# Patient Record
Sex: FEMALE | Race: WHITE | NOT HISPANIC OR LATINO | Employment: OTHER | URBAN - METROPOLITAN AREA
[De-identification: names, ages, dates, MRNs, and addresses within clinical notes are randomized per-mention and may not be internally consistent; named-entity substitution may affect disease eponyms.]

---

## 2017-02-20 RX ORDER — CONJUGATED ESTROGENS AND MEDROXYPROGESTERONE ACETATE .625; 2.5 MG/1; MG/1
TABLET, SUGAR COATED ORAL
Qty: 28 TABLET | Refills: 6 | OUTPATIENT
Start: 2017-02-20

## 2017-03-17 RX ORDER — CONJUGATED ESTROGENS AND MEDROXYPROGESTERONE ACETATE .625; 2.5 MG/1; MG/1
TABLET, SUGAR COATED ORAL
Qty: 28 TABLET | Refills: 6 | OUTPATIENT
Start: 2017-03-17

## 2017-03-22 ENCOUNTER — OFFICE VISIT (OUTPATIENT)
Dept: OBSTETRICS AND GYNECOLOGY | Facility: CLINIC | Age: 27
End: 2017-03-22
Payer: MEDICAID

## 2017-03-22 VITALS
DIASTOLIC BLOOD PRESSURE: 80 MMHG | WEIGHT: 243 LBS | RESPIRATION RATE: 10 BRPM | SYSTOLIC BLOOD PRESSURE: 120 MMHG | BODY MASS INDEX: 40.48 KG/M2 | HEART RATE: 78 BPM | HEIGHT: 65 IN

## 2017-03-22 DIAGNOSIS — Z01.419 WELL WOMAN EXAM WITH ROUTINE GYNECOLOGICAL EXAM: Primary | ICD-10-CM

## 2017-03-22 DIAGNOSIS — Z12.4 CERVICAL CANCER SCREENING: ICD-10-CM

## 2017-03-22 DIAGNOSIS — Z79.890 HORMONE REPLACEMENT THERAPY (HRT): ICD-10-CM

## 2017-03-22 DIAGNOSIS — E28.39 PREMATURE OVARIAN FAILURE: ICD-10-CM

## 2017-03-22 DIAGNOSIS — M85.80 OSTEOPENIA DETERMINED BY X-RAY: ICD-10-CM

## 2017-03-22 PROCEDURE — 88175 CYTOPATH C/V AUTO FLUID REDO: CPT

## 2017-03-22 PROCEDURE — 99213 OFFICE O/P EST LOW 20 MIN: CPT | Mod: PBBFAC | Performed by: OBSTETRICS & GYNECOLOGY

## 2017-03-22 PROCEDURE — 99395 PREV VISIT EST AGE 18-39: CPT | Mod: S$PBB,,, | Performed by: OBSTETRICS & GYNECOLOGY

## 2017-03-22 PROCEDURE — 99999 PR PBB SHADOW E&M-EST. PATIENT-LVL III: CPT | Mod: PBBFAC,,, | Performed by: OBSTETRICS & GYNECOLOGY

## 2017-03-22 RX ORDER — ALBUTEROL SULFATE 90 UG/1
AEROSOL, METERED RESPIRATORY (INHALATION)
Refills: 0 | COMMUNITY
Start: 2017-03-14 | End: 2018-04-19

## 2017-03-22 RX ORDER — VENLAFAXINE HYDROCHLORIDE 75 MG/1
1 CAPSULE, EXTENDED RELEASE ORAL DAILY
Refills: 0 | COMMUNITY
Start: 2017-02-13 | End: 2018-04-19

## 2017-03-22 NOTE — MR AVS SNAPSHOT
[facility]  [address] [facility] [address]  Phone: [phone]                  Francis Bains   3/22/2017 4:15 PM   Office Visit    Description:  Female : 1990   Provider:  Rand Morrison MD   Department:  [facility]           Reason for Visit     Gynecologic Exam           Diagnoses this Visit        Comments    Well woman exam with routine gynecological exam    -  Primary     Cervical cancer screening         Premature ovarian failure         Osteopenia determined by x-ray         Hormone replacement therapy (HRT)                To Do List           Goals (5 Years of Data)     None       These Medications        Disp Refills Start End    estrogen, conjugated,-medroxyprogesterone 0.625-2.5mg (PREMPRO) 0.625-2.5 mg per tablet 45 tablet 11 3/22/2017 2017    Take 1.5 tablets by mouth once daily. - Oral    Pharmacy: [address] Ph #: [phone]         [facility]     [facility] On Call Nurse Care Line -  Assistance  Registered nurses in the [facility] On Call Center provide clinical advisement, health education, appointment booking, and other advisory services.  Call for this free service at [phone].             Medications           Message regarding Medications     Verify the changes and/or additions to your medication regime listed below are the same as discussed with your clinician today.  If any of these changes or additions are incorrect, please notify your healthcare provider.        START taking these NEW medications        Refills    estrogen, conjugated,-medroxyprogesterone 0.625-2.5mg (PREMPRO) 0.625-2.5 mg per tablet 11    Sig: Take 1.5 tablets by mouth once daily.    Class: Normal    Route: Oral      STOP taking these medications     clonazePAM (KLONOPIN) 0.5 MG tablet 1 tablet once daily.    fluoxetine (PROZAC) 40 MG capsule Take 40 mg by mouth once daily.     "hydrocodone-acetaminophen 7.5-325mg (NORCO) 7.5-325 mg per tablet Take 1 tablet by mouth every 6 (six) hours as needed.    trazodone (DESYREL) 50 MG tablet Take 50 mg by mouth nightly.           Verify that the below list of medications is an accurate representation of the medications you are currently taking.  If none reported, the list may be blank. If incorrect, please contact your healthcare provider. Carry this list with you in case of emergency.           Current Medications     amlodipine (NORVASC) 10 MG tablet Take 10 mg by mouth once daily.    venlafaxine (EFFEXOR-XR) 75 MG 24 hr capsule 1 capsule once daily.    VENTOLIN HFA 90 mcg/actuation inhaler INHALE 2 PUFFS BY MOUTH EVERY 4 TO 6 HOURS AS NEEDED    estrogen, conjugated,-medroxyprogesterone 0.625-2.5mg (PREMPRO) 0.625-2.5 mg per tablet Take 1.5 tablets by mouth once daily.           Clinical Reference Information           Your Vitals Were     BP Pulse Resp Height Weight BMI    120/80 78 10 5' 5" (1.651 m) 110.2 kg (243 lb) 40.44 kg/m2      Blood Pressure          Most Recent Value    BP  120/80      Allergies as of 3/22/2017     Sulfa (Sulfonamide Antibiotics)    Pcn  [Penicillins]      Immunizations Administered on Date of Encounter - 3/22/2017     None      Orders Placed During Today's Visit      Normal Orders This Visit    Liquid-based pap smear, screening       Smoking Cessation     If you would like to quit smoking:   You may be eligible for free services if you are a [place] resident and started smoking cigarettes before September 1, 1988.  Call the [employer]ess[employer] ([employer]) toll free at [phone] 850-4354 or [phone].   Call 8-664-QUIT-NOW if you do not meet the above criteria.            Language Assistance Services     ATTENTION: Language assistance services are available, free of charge. Please call [phone].      ATENCIÓN: Si malachila kyler, tiene a kurtz disposición servicios gratuitos de asistencia lingüística. Llame al " [phone].     HALLE Ý: N?u b?n nói Ti?ng Vi?t, có các d?ch v? h? tr? ngôn ng? mi?n phí dành cho b?n. G?i s? [phone].         [facility] complies with applicable Federal civil rights laws and does not discriminate on the basis of race, color, national origin, age, disability, or sex.

## 2017-03-22 NOTE — PROGRESS NOTES
Subjective:    Patient ID: Francis Bains is a 27 y.o. y.o. female.     Chief Complaint: Annual Well Woman Exam     History of Present Illness:  Francis presents today for Annual Well Woman exam. She describes her menses as absent.She denies pelvic pain.  She denies breast tenderness, masses, nipple discharge. She denies difficulty with urination or bowel movements. She admits to menopausal symptoms such as hotflashes, vaginal dryness, and night sweats. She denies bloating, early satiety, or weight changes. She is not currently sexually active. Contraception is by no method.    Patient requesting increase in HRT secondary to still having symptoms of mood swings, hot flashes, and night sweats.       Menstrual History:   No LMP recorded. Patient is not currently having periods (Reason: Other)..     OB History      Para Term  AB TAB SAB Ectopic Multiple Living    0                   The following portions of the patient's history were reviewed and updated as appropriate: allergies, current medications, past family history, past medical history, past social history, past surgical history and problem list.    ROS:   CONSTITUTIONAL: Negative for fever, chills, diaphoresis, weakness, fatigue, weight loss, weight gain  ENT: negative for sore throat, nasal congestion, nasal discharge, epistaxis, tinnitus, hearing loss  EYES: negative for blurry vision, decreased vision, loss of vision, eye pain, diplopia, photophobia, discharge  SKIN: Negative for rash, itching, hives  RESPIRATORY: negative for cough, hemoptysis, shortness of breath, pleuritic chest pain, wheezing  CARDIOVASCULAR: negative for chest pain, dyspnea on exertion, orthopnea, paroxysmal nocturnal dyspnea, edema, palpitations  BREAST: negative for breast  tenderness, breast mass, nipple discharge, or skin changes  GASTROINTESTINAL: negative for abdominal pain, flank pain, nausea, vomiting, diarrhea, constipation, black stool, blood in  "stool  GENITOURINARY: negative for abnormal vaginal bleeding, amenorrhea, decreased libido, dysuria, genital sores, hematuria, incontinence, menorrhagia, pelvic pain, urinary frequency, vaginal discharge  HEMATOLOGIC/LYMPHATIC: negative for swollen lymph nodes, bleeding, bruising  MUSCULOSKELETAL: negative for back pain, joint pain, joint stiffness, joint swelling, muscle pain, muscle weakness  NEUROLOGICAL: negative for dizzy/vertigo, headache, focal weakness, numbness/tingling, speech problems, loss of consciousness, confusion, memory loss  BEHAVORIAL/PSYCH: negative for anxiety, depression, psychosis  ENDOCRINE: negative for polydipsia/polyuria, palpitations, skin changes, temperature intolerance, unexpected weight changes  ALLERGIC/IMMUNOLOGIC: negative for urticaria, hay fever, angioedema      Objective:    Vital Signs:  Vitals:    03/22/17 1618   BP: 120/80   Pulse: 78   Resp: 10   Weight: 110.2 kg (243 lb)   Height: 5' 5" (1.651 m)         Physical Exam:  General:  alert, cooperative, appears stated age   Skin:  Skin color, texture, turgor normal. No rashes or lesions   HEENT:  conjunctivae/corneas clear. PERRL.   Neck: supple, trachea midline, no adenopathy or thyromegally   Respiratory:  clear to auscultation bilaterally   Heart:  regular rate and rhythm, S1, S2 normal, no murmur, click, rub or gallop   Breasts:  no discharge, erythema, or tenderness   Abdomen:  normal findings: bowel sounds normal, no masses palpable, no organomegaly and soft, non-tender   Pelvis: External genitalia: normal general appearance  Urinary system: urethral meatus normal, bladder nontender  Vaginal: normal mucosa without prolapse or lesions  Cervix: normal appearance  Uterus: normal size, shape, position  Adnexa: normal size, nontender bilaterally   Extremities: Normal ROM; no edema, no cyanosis   Neurologial: Normal strength and tone. No focal numbness or weakness. Reflexes 2+ and equal.   Psychiatric: normal mood, speech, " dress, and thought processes         Assessment:       Healthy female exam.     1. Well woman exam with routine gynecological exam    2. Cervical cancer screening    3. Premature ovarian failure    4. Osteopenia determined by x-ray          Plan:       Increase prempro dosage; offered OCPs but declines    Discussed weight bearing exercise, calcium, and vitamin d for osteoporosis prevention  Pap smear  RTC in 1 year or prn    COUNSELING:  Francis was counseled on STD pevention, use and side-effects of various contraceptive measures, A.C.O.G. Pap guidelines and recommendations for yearly pelvic exams in addition to recommendations for monthly self breast exams; to see her PCP for other health maintenance.

## 2017-03-23 ENCOUNTER — TELEPHONE (OUTPATIENT)
Dept: OBSTETRICS AND GYNECOLOGY | Facility: CLINIC | Age: 27
End: 2017-03-23

## 2017-03-23 NOTE — TELEPHONE ENCOUNTER
Desire from pt pharmacy states Prempro unable to be dispensed as prescribed. Comes in unbreakable pack of 28, also states insurance will not cover 1.5 tablets of Prempro. Recommends trying Premarin or prescribing as separate ingredients to ensure desired dosage and insurance coverage. Please advise.

## 2017-03-23 NOTE — TELEPHONE ENCOUNTER
----- Message from Shadia Ortiz MA sent at 3/23/2017  9:55 AM CDT -----  Contact: [facility]   Francis Bains  MRN: [id number]  : 1990  PCP: Tereza Magaña  Home Phone      [phone]  Work Phone      Not on file.  Mobile          [phone]      MESSAGE:   [facility] is calling for verification of a medication that was send over yesterday please call @ [phone]

## 2017-05-26 ENCOUNTER — CLINICAL SUPPORT (OUTPATIENT)
Dept: AUDIOLOGY | Facility: CLINIC | Age: 27
End: 2017-05-26
Payer: MEDICAID

## 2017-05-26 DIAGNOSIS — H90.3 SENSORINEURAL HEARING LOSS, BILATERAL: Primary | ICD-10-CM

## 2017-05-26 PROCEDURE — 92604 REPROGRAM COCHLEAR IMPLT 7/>: CPT | Mod: PBBFAC | Performed by: PHYSICIAN ASSISTANT

## 2017-05-26 NOTE — PROGRESS NOTES
Cochlear Implant Programming Session:    Francis was in today with her friend to check the status of her  sound processor.  Her dog chewed the beige coil/cable to the point of non-use.  Her processor and battery were still in good condition.  I loaned her my white N5 coil/cable.  She was much surprised when she put the processor on her head, saying it was just too loud.  It has been about 1 1/2 months since she has worn her processor.  I lowered her overall program and gave gradual maps with increased loudness.  She was comfortable with the sound quality when she left my office.      Her main reason for today's visit was to discuss the upgrade process.  She decided on the  ear level unit.  I will help her start the process with [employer].  We will complete audiological testing when we program her upgraded processor.

## 2018-01-10 ENCOUNTER — CLINICAL SUPPORT (OUTPATIENT)
Dept: AUDIOLOGY | Facility: CLINIC | Age: 28
End: 2018-01-10
Payer: MEDICAID

## 2018-01-10 DIAGNOSIS — H90.3 SENSORINEURAL HEARING LOSS, BILATERAL: Primary | ICD-10-CM

## 2018-01-10 PROCEDURE — 92604 REPROGRAM COCHLEAR IMPLT 7/>: CPT | Mod: PBBFAC | Performed by: PHYSICIAN ASSISTANT

## 2018-01-10 NOTE — PROGRESS NOTES
Cochlear Implant Programming Session:    DOS: 3/16/2010; Right Ear;  internal device    Ms. Bains upgraded her cochlear processor today from the N5 unit to the N7 [id number] processor (beige).  She was experiencing a lot of intermittencies with her N5 unit.  She was pleased with her new processor and appreciated the difference in the sound quality.  She required a #5 magnet with the N7 unit for best retention.      We gave 2 programs today:  P1-SCAN (comfortable at a volume of 5)  P2-somewhat louder program without SCAN    Francis does not have the iPhone, so we paired the phone clip to her processor.  She was really excited about being able to hear better on her cell phone via the phone clip.  She also appreciated that she could hear music!  She will be using rechargeable batteries with her new upgraded processor. We reviewed the use of the Aqua Accessory as well.    Recommend:  1.  Consistent and continued use of her N7 processor  2.  Audiological testing on her next visit  3.  Programming follow up visit in one month

## 2018-02-19 ENCOUNTER — PATIENT MESSAGE (OUTPATIENT)
Dept: AUDIOLOGY | Facility: CLINIC | Age: 28
End: 2018-02-19

## 2018-03-26 NOTE — TELEPHONE ENCOUNTER
Francis desire refill of Prempro. Annual exam scheduled this week.   Patient Active Problem List   Diagnosis    Female infertility of unspecified origin    Infantile cerebral palsy, unspecified    Osteopenia determined by x-ray    Premature ovarian failure     Prior to Admission medications    Medication Sig Start Date End Date Taking? Authorizing Provider   amlodipine (NORVASC) 10 MG tablet Take 10 mg by mouth once daily. 8/11/14   Historical Provider, MD   estrogen, conjugated,-medroxyprogesterone 0.3-1.5 mg (PREMPRO) 0.3-1.5 mg per tablet Take 1 tablet by mouth once daily. 3/23/17 3/23/18  Rand Morrison MD   venlafaxine (EFFEXOR-XR) 75 MG 24 hr capsule 1 capsule once daily. 2/13/17   Historical Provider, MD   VENTOLIN HFA 90 mcg/actuation inhaler INHALE 2 PUFFS BY MOUTH EVERY 4 TO 6 HOURS AS NEEDED 3/14/17   Historical Provider, MD

## 2018-03-28 RX ORDER — CONJUGATED ESTROGENS AND MEDROXYPROGESTERONE ACETATE .3; 1.5 MG/1; MG/1
1 TABLET, SUGAR COATED ORAL DAILY
Qty: 28 TABLET | Refills: 0 | Status: SHIPPED | OUTPATIENT
Start: 2018-03-28 | End: 2018-04-19 | Stop reason: SDUPTHER

## 2018-03-28 RX ORDER — CONJUGATED ESTROGENS AND MEDROXYPROGESTERONE ACETATE .625; 2.5 MG/1; MG/1
1 TABLET, SUGAR COATED ORAL DAILY
Qty: 28 TABLET | Refills: 0 | Status: SHIPPED | OUTPATIENT
Start: 2018-03-28 | End: 2018-04-19 | Stop reason: SDUPTHER

## 2018-04-19 ENCOUNTER — OFFICE VISIT (OUTPATIENT)
Dept: OBSTETRICS AND GYNECOLOGY | Facility: CLINIC | Age: 28
End: 2018-04-19
Payer: MEDICAID

## 2018-04-19 VITALS
HEIGHT: 65 IN | DIASTOLIC BLOOD PRESSURE: 68 MMHG | RESPIRATION RATE: 10 BRPM | WEIGHT: 258 LBS | BODY MASS INDEX: 42.99 KG/M2 | SYSTOLIC BLOOD PRESSURE: 120 MMHG | HEART RATE: 88 BPM

## 2018-04-19 DIAGNOSIS — E28.39 PREMATURE OVARIAN FAILURE: ICD-10-CM

## 2018-04-19 DIAGNOSIS — Z01.419 WELL WOMAN EXAM WITH ROUTINE GYNECOLOGICAL EXAM: Primary | ICD-10-CM

## 2018-04-19 DIAGNOSIS — Z79.890 HORMONE REPLACEMENT THERAPY (HRT): ICD-10-CM

## 2018-04-19 DIAGNOSIS — M85.80 OSTEOPENIA DETERMINED BY X-RAY: ICD-10-CM

## 2018-04-19 PROCEDURE — 99395 PREV VISIT EST AGE 18-39: CPT | Mod: S$PBB,,, | Performed by: OBSTETRICS & GYNECOLOGY

## 2018-04-19 PROCEDURE — 99213 OFFICE O/P EST LOW 20 MIN: CPT | Mod: PBBFAC,PN | Performed by: OBSTETRICS & GYNECOLOGY

## 2018-04-19 PROCEDURE — 99999 PR PBB SHADOW E&M-EST. PATIENT-LVL III: CPT | Mod: PBBFAC,,, | Performed by: OBSTETRICS & GYNECOLOGY

## 2018-04-19 RX ORDER — PANTOPRAZOLE SODIUM 40 MG/1
TABLET, DELAYED RELEASE ORAL
COMMUNITY
Start: 2018-04-12 | End: 2019-11-22

## 2018-04-19 RX ORDER — MIRTAZAPINE 15 MG/1
TABLET, FILM COATED ORAL
COMMUNITY
Start: 2018-03-26 | End: 2021-03-18

## 2018-04-19 RX ORDER — SERTRALINE HYDROCHLORIDE 100 MG/1
TABLET, FILM COATED ORAL
Status: ON HOLD | COMMUNITY
Start: 2018-03-03 | End: 2022-06-10 | Stop reason: SDUPTHER

## 2018-04-19 RX ORDER — SUCRALFATE 1 G/10ML
SUSPENSION ORAL
COMMUNITY
Start: 2018-04-16 | End: 2019-11-22

## 2018-04-19 NOTE — PROGRESS NOTES
Subjective:    Patient ID: Francis Bains is a 28 y.o. y.o. female.     Chief Complaint: Annual Well Woman Exam     History of Present Illness:  Francis presents today for Annual Well Woman exam. She describes her menses as absent.She denies pelvic pain.  She denies breast tenderness, masses, nipple discharge. She denies difficulty with urination or bowel movements. She admits to menopausal symptoms such as hotflashes, vaginal dryness, and night sweats however better controlled with current Prempro dosage. She denies bloating, early satiety, or weight changes. She is not currently sexually active. Contraception is by no method.      Menstrual History:   No LMP recorded. Patient is not currently having periods (Reason: Other)..     OB History      Para Term  AB Living    0              SAB TAB Ectopic Multiple Live Births                       The following portions of the patient's history were reviewed and updated as appropriate: allergies, current medications, past family history, past medical history, past social history, past surgical history and problem list.    ROS:   CONSTITUTIONAL: Negative for fever, chills, diaphoresis, weakness, fatigue, weight loss, weight gain  ENT: negative for sore throat, nasal congestion, nasal discharge, epistaxis, tinnitus, hearing loss  EYES: negative for blurry vision, decreased vision, loss of vision, eye pain, diplopia, photophobia, discharge  SKIN: Negative for rash, itching, hives  RESPIRATORY: negative for cough, hemoptysis, shortness of breath, pleuritic chest pain, wheezing  CARDIOVASCULAR: negative for chest pain, dyspnea on exertion, orthopnea, paroxysmal nocturnal dyspnea, edema, palpitations  BREAST: negative for breast  tenderness, breast mass, nipple discharge, or skin changes  GASTROINTESTINAL: negative for abdominal pain, flank pain, nausea, vomiting, diarrhea, constipation, black stool, blood in stool  GENITOURINARY: negative for abnormal  "vaginal bleeding, amenorrhea, decreased libido, dysuria, genital sores, hematuria, incontinence, menorrhagia, pelvic pain, urinary frequency, vaginal discharge  HEMATOLOGIC/LYMPHATIC: negative for swollen lymph nodes, bleeding, bruising  MUSCULOSKELETAL: negative for back pain, joint pain, joint stiffness, joint swelling, muscle pain, muscle weakness  NEUROLOGICAL: negative for dizzy/vertigo, headache, focal weakness, numbness/tingling, speech problems, loss of consciousness, confusion, memory loss  BEHAVORIAL/PSYCH: negative for anxiety, depression, psychosis  ENDOCRINE: negative for polydipsia/polyuria, palpitations, skin changes, temperature intolerance, unexpected weight changes  ALLERGIC/IMMUNOLOGIC: negative for urticaria, hay fever, angioedema      Objective:    Vital Signs:  Vitals:    04/19/18 1201   BP: 120/68   Pulse: 88   Resp: 10   Weight: 117 kg (258 lb)   Height: 5' 5" (1.651 m)         Physical Exam:  General:  alert, cooperative, appears stated age   Skin:  Skin color, texture, turgor normal. No rashes or lesions   HEENT:  conjunctivae/corneas clear. PERRL.   Neck: supple, trachea midline, no adenopathy or thyromegally   Respiratory:  clear to auscultation bilaterally   Heart:  regular rate and rhythm, S1, S2 normal, no murmur, click, rub or gallop   Breasts:  no discharge, erythema, or tenderness   Abdomen:  normal findings: bowel sounds normal, no masses palpable, no organomegaly and soft, non-tender   Pelvis: External genitalia: normal general appearance  Urinary system: urethral meatus normal, bladder nontender  Vaginal: normal mucosa without prolapse or lesions  Cervix: normal appearance  Uterus: normal size, shape, position  Adnexa: normal size, nontender bilaterally   Extremities: Normal ROM; no edema, no cyanosis   Neurologial: Normal strength and tone. No focal numbness or weakness. Reflexes 2+ and equal.   Psychiatric: normal mood, speech, dress, and thought processes         Assessment:  "      Healthy female exam.     1. Well woman exam with routine gynecological exam    2. Hormone replacement therapy (HRT)    3. Osteopenia determined by x-ray    4. Premature ovarian failure          Plan:        Refill Prempro; patient on 0.3 and 0.635 dosage daily    Discussed weight bearing exercise, calcium, and vitamin d for osteoporosis prevention  Pap smear deferred  RTC in 1 year or prn    COUNSELING:  Francis was counseled on STD pevention, use and side-effects of various contraceptive measures, A.C.O.G. Pap guidelines and recommendations for yearly pelvic exams in addition to recommendations for monthly self breast exams; to see her PCP for other health maintenance.

## 2018-09-02 ENCOUNTER — PATIENT MESSAGE (OUTPATIENT)
Dept: OBSTETRICS AND GYNECOLOGY | Facility: CLINIC | Age: 28
End: 2018-09-02

## 2018-09-22 ENCOUNTER — HOSPITAL ENCOUNTER (EMERGENCY)
Facility: HOSPITAL | Age: 28
Discharge: HOME | End: 2018-09-22
Attending: SURGERY
Payer: MEDICAID

## 2018-09-22 VITALS
WEIGHT: 258 LBS | DIASTOLIC BLOOD PRESSURE: 80 MMHG | OXYGEN SATURATION: 100 % | BODY MASS INDEX: 42.93 KG/M2 | RESPIRATION RATE: 17 BRPM | SYSTOLIC BLOOD PRESSURE: 175 MMHG | HEART RATE: 70 BPM | TEMPERATURE: 97 F

## 2018-09-22 DIAGNOSIS — F41.9 ANXIETY: Primary | ICD-10-CM

## 2018-09-22 DIAGNOSIS — M54.50 MIDLINE LOW BACK PAIN WITHOUT SCIATICA, UNSPECIFIED CHRONICITY: ICD-10-CM

## 2018-09-22 PROCEDURE — 96372 THER/PROPH/DIAG INJ SC/IM: CPT

## 2018-09-22 PROCEDURE — 63600175 PHARM REV CODE 636 W HCPCS: Performed by: SURGERY

## 2018-09-22 PROCEDURE — 99284 EMERGENCY DEPT VISIT MOD MDM: CPT | Mod: 25

## 2018-09-22 RX ORDER — CYCLOBENZAPRINE HCL 10 MG
10 TABLET ORAL 3 TIMES DAILY PRN
Qty: 10 TABLET | Refills: 0 | Status: SHIPPED | OUTPATIENT
Start: 2018-09-22 | End: 2018-09-27

## 2018-09-22 RX ORDER — LORAZEPAM 2 MG/ML
2 INJECTION INTRAMUSCULAR
Status: COMPLETED | OUTPATIENT
Start: 2018-09-22 | End: 2018-09-22

## 2018-09-22 RX ORDER — ONDANSETRON 2 MG/ML
4 INJECTION INTRAMUSCULAR; INTRAVENOUS
Status: COMPLETED | OUTPATIENT
Start: 2018-09-22 | End: 2018-09-22

## 2018-09-22 RX ORDER — METHYLPREDNISOLONE 4 MG/1
TABLET ORAL
Qty: 1 PACKAGE | Refills: 0 | Status: SHIPPED | OUTPATIENT
Start: 2018-09-22 | End: 2021-03-18

## 2018-09-22 RX ORDER — MORPHINE SULFATE 2 MG/ML
2 INJECTION, SOLUTION INTRAMUSCULAR; INTRAVENOUS
Status: COMPLETED | OUTPATIENT
Start: 2018-09-22 | End: 2018-09-22

## 2018-09-22 RX ORDER — KETOROLAC TROMETHAMINE 10 MG/1
10 TABLET, FILM COATED ORAL EVERY 6 HOURS PRN
Qty: 15 TABLET | Refills: 0 | Status: SHIPPED | OUTPATIENT
Start: 2018-09-22 | End: 2021-03-18

## 2018-09-22 RX ADMIN — LORAZEPAM 2 MG: 2 INJECTION INTRAMUSCULAR; INTRAVENOUS at 07:09

## 2018-09-22 RX ADMIN — ONDANSETRON 4 MG: 2 INJECTION INTRAMUSCULAR; INTRAVENOUS at 06:09

## 2018-09-22 RX ADMIN — Medication 2 MG: at 06:09

## 2018-09-23 NOTE — ED PROVIDER NOTES
[facility]                                                 Chief Complaint  28 y.o. female with Back Pain    History of Present Illness  Francis Bains presents to the emergency room with low back pain today  Patient had low back pain for last couple days, history of low back pain issues  Denies any new trauma or fall, chronic low back pain almost daily for years now  Patient on exam has a normal lumbar spine without step-off or deformity noted  Normal neurologic exam with normal leg strength, no cauda equina syndrome  Patient had a negative x-ray today in the ER, requesting a shot also for anxiety    The history is provided by the patient   device was not used during this ER visit    Past Medical History   -- Cerebral palsy    -- Hearing impaired person    -- Hypertension    -- Osteopenia determined by x-ray    -- Oswaldo syndrome      Past Surgical History   -- CHOLECYSTECTOMY     -- INNER EAR SURGERY     -- RENAL BIOPSY        Review of patient's allergies   -- Sulfa (sulfonamide antibiotics)    -- Pcn  [penicillins]       Review of Systems and Physical Exam      Review of Systems  -- Constitution - no fever, denies fatigue, no weakness, no chills  -- Eyes - no tearing or redness, no visual disturbance  -- Ear, Nose - no tinnitus or earache, no nasal congestion or discharge  -- Mouth,Throat - no sore throat, no toothache, normal voice, normal swallowing  -- Respiratory - denies cough and congestion, no shortness of breath, no MCPHERSON  -- Cardiovascular - denies chest pain, no palpitations, denies claudication  -- Gastrointestinal - denies abdominal pain, nausea, vomiting, or diarrhea  -- Genitourinary - no dysuria, no hematuria, no flank pain, no bladder pain  -- Musculoskeletal - back pain, negative for myalgias and arthralgias   -- Neurological - no headache, denies weakness or seizure; no LOC  -- Psychiatric - anxiety, denies SI or HI, no psychosis or fractured thought  noted     Vital Signs  Her blood pressure is 184/100 and her pulse is 73.   Her respiration is 17 and oxygen saturation is 98%.     Physical Exam  -- Nursing note and vitals reviewed  -- Constitutional: Appears well-developed and well-nourished  -- Head: Atraumatic. Normocephalic. No obvious abnormality  -- Eyes: Pupils are equal and reactive to light. Normal conjunctiva and lids  -- Cardiac: Normal rate, regular rhythm and normal heart sounds  -- Pulmonary: Normal respiratory effort, breath sounds clear to auscultation  -- Abdominal: Soft, no tenderness. Normal bowel sounds. Normal liver edge  -- Musculoskeletal: Normal range of motion, no effusions. Joints stable   -- Neurological: No focal deficits. Showed good interaction with staff  -- Skin: Warm and dry. No evidence of rash or cellulitis    Emergency Room Course      Radiology  -- L-spine x-rays showed no evidence of acute fracture or dislocation     Medications Given  morphine injection 2 mg (2 mg Intramuscular Given 9/22/18 1854)   ondansetron injection 4 mg (4 mg Intramuscular Given 9/22/18 1854)   lorazepam injection 2 mg (2 mg Intramuscular Given 9/22/18 1949)     Diagnosis  -- Anxiety  -- Midline low back pain without sciatica    Disposition and Plan  -- Disposition: home  -- Condition: stable  -- Follow-up: Patient to follow up with Tereza Magaña MD in 1-2 days.  -- I advised the patient that we have found no life threatening condition today  -- At this time, I believe the patient is clinically stable for discharge.   -- The patient acknowledges that close follow up with a MD is required   -- Patient agrees to comply with all instruction and direction given in the ER    This note is dictated on Dragon Natural Speaking word recognition program.  There are word recognition mistakes that are occasionally missed on review.          Guerrero Urban MD  09/22/18 1958

## 2019-03-21 ENCOUNTER — PATIENT MESSAGE (OUTPATIENT)
Dept: AUDIOLOGY | Facility: CLINIC | Age: 29
End: 2019-03-21

## 2019-03-26 ENCOUNTER — CLINICAL SUPPORT (OUTPATIENT)
Dept: AUDIOLOGY | Facility: CLINIC | Age: 29
End: 2019-03-26
Payer: MEDICAID

## 2019-03-26 DIAGNOSIS — H90.3 SENSORINEURAL HEARING LOSS, BILATERAL: Primary | ICD-10-CM

## 2019-03-26 PROCEDURE — 99211 OFF/OP EST MAY X REQ PHY/QHP: CPT | Mod: PBBFAC,25 | Performed by: PHYSICIAN ASSISTANT

## 2019-03-26 PROCEDURE — 92557 COMPREHENSIVE HEARING TEST: CPT | Mod: PBBFAC | Performed by: PHYSICIAN ASSISTANT

## 2019-03-26 PROCEDURE — 99999 PR PBB SHADOW E&M-EST. PATIENT-LVL I: CPT | Mod: PBBFAC,,, | Performed by: PHYSICIAN ASSISTANT

## 2019-03-26 PROCEDURE — 99999 PR PBB SHADOW E&M-EST. PATIENT-LVL I: ICD-10-PCS | Mod: PBBFAC,,, | Performed by: PHYSICIAN ASSISTANT

## 2019-03-26 NOTE — PROGRESS NOTES
"Cochlear Implant Programming Session:    Right Ear:   - Yolto  Internal Device/Serial Number- [id number]  Processor- [id number]  DOS: 3/16/2010  DIS: 4/28/2010  Fitting Date- 1/2018 (upgraded from the N5)  Processor Color- Beige  Magnet Strength- 5  Coil Length- 2" (too short, per patient)  Battery Type- Rechargeable  Warranty- Expires 2020    Ms. Bains was in today with her boyfriend. She was in for a routine programming visit.  She is doing well overall with her N7 sound processor.  She does complain however that the overall sound on program 2 is too soft.  Her N7 sound processor was connected to [employer] and some changes were made to her 't' and 'c' levels.  She was pleased with the overall increase in stimulation.  This new setting was put in P2 with no changes to P1.  We did change her miguelina over on the N7 processor.  Ms. Bains says her cable is too short, so I will order one for her through [employer] and have it shipped to her home address.    P1: SCAN  P2: increased sound; no SCAN    Recommend:  1.  Consistent and continued use of her N7 sound processor.  2.  Annual programming visits  3.  Annual audiological testing            "

## 2019-05-01 ENCOUNTER — OFFICE VISIT (OUTPATIENT)
Dept: OBSTETRICS AND GYNECOLOGY | Facility: CLINIC | Age: 29
End: 2019-05-01
Payer: MEDICAID

## 2019-05-01 VITALS
HEIGHT: 65 IN | BODY MASS INDEX: 37.82 KG/M2 | HEART RATE: 80 BPM | RESPIRATION RATE: 10 BRPM | SYSTOLIC BLOOD PRESSURE: 120 MMHG | WEIGHT: 227 LBS | DIASTOLIC BLOOD PRESSURE: 78 MMHG

## 2019-05-01 DIAGNOSIS — Z12.39 SCREENING FOR BREAST CANCER: ICD-10-CM

## 2019-05-01 DIAGNOSIS — Z79.890 HORMONE REPLACEMENT THERAPY (HRT): ICD-10-CM

## 2019-05-01 DIAGNOSIS — E28.39 PREMATURE OVARIAN FAILURE: ICD-10-CM

## 2019-05-01 DIAGNOSIS — Z01.419 WELL WOMAN EXAM WITH ROUTINE GYNECOLOGICAL EXAM: Primary | ICD-10-CM

## 2019-05-01 DIAGNOSIS — M85.80 OSTEOPENIA DETERMINED BY X-RAY: ICD-10-CM

## 2019-05-01 PROCEDURE — 99999 PR PBB SHADOW E&M-EST. PATIENT-LVL III: CPT | Mod: PBBFAC,,, | Performed by: OBSTETRICS & GYNECOLOGY

## 2019-05-01 PROCEDURE — 99999 PR PBB SHADOW E&M-EST. PATIENT-LVL III: ICD-10-PCS | Mod: PBBFAC,,, | Performed by: OBSTETRICS & GYNECOLOGY

## 2019-05-01 PROCEDURE — 99395 PREV VISIT EST AGE 18-39: CPT | Mod: S$PBB,,, | Performed by: OBSTETRICS & GYNECOLOGY

## 2019-05-01 PROCEDURE — 99395 PR PREVENTIVE VISIT,EST,18-39: ICD-10-PCS | Mod: S$PBB,,, | Performed by: OBSTETRICS & GYNECOLOGY

## 2019-05-01 PROCEDURE — 99213 OFFICE O/P EST LOW 20 MIN: CPT | Mod: PBBFAC | Performed by: OBSTETRICS & GYNECOLOGY

## 2019-05-01 RX ORDER — BUSPIRONE HYDROCHLORIDE 15 MG/1
TABLET ORAL
Refills: 3 | COMMUNITY
Start: 2019-04-23 | End: 2021-03-18

## 2019-05-01 NOTE — PROGRESS NOTES
Subjective:    Patient ID: Francis Bains is a 29 y.o. y.o. female.     Chief Complaint: Annual Well Woman Exam     History of Present Illness:  Francis presents today for Annual Well Woman exam. She describes her menses as absent.She denies pelvic pain.  She denies breast tenderness, masses, nipple discharge. She denies difficulty with urination or bowel movements. She admits to menopausal symptoms such as hotflashes, vaginal dryness, and night sweats however better controlled with current Prempro dosage. She denies bloating, early satiety, or weight changes. She is sexually active. Contraception is by no method.    The following portions of the patient's history were reviewed and updated as appropriate: allergies, current medications, past family history, past medical history, past social history, past surgical history and problem list.      Menstrual History:   No LMP recorded. (Menstrual status: Other)..     OB History        0    Para        Term                AB        Living           SAB        TAB        Ectopic        Multiple        Live Births                     The following portions of the patient's history were reviewed and updated as appropriate: allergies, current medications, past family history, past medical history, past social history, past surgical history and problem list.    ROS:   CONSTITUTIONAL: Negative for fever, chills, diaphoresis, weakness, fatigue, weight loss, weight gain  ENT: negative for sore throat, nasal congestion, nasal discharge, epistaxis, tinnitus, hearing loss  EYES: negative for blurry vision, decreased vision, loss of vision, eye pain, diplopia, photophobia, discharge  SKIN: Negative for rash, itching, hives  RESPIRATORY: negative for cough, hemoptysis, shortness of breath, pleuritic chest pain, wheezing  CARDIOVASCULAR: negative for chest pain, dyspnea on exertion, orthopnea, paroxysmal nocturnal dyspnea, edema, palpitations  BREAST: negative for  "breast  tenderness, breast mass, nipple discharge, or skin changes  GASTROINTESTINAL: negative for abdominal pain, flank pain, nausea, vomiting, diarrhea, constipation, black stool, blood in stool  GENITOURINARY: negative for abnormal vaginal bleeding, amenorrhea, decreased libido, dysuria, genital sores, hematuria, incontinence, menorrhagia, pelvic pain, urinary frequency, vaginal discharge  HEMATOLOGIC/LYMPHATIC: negative for swollen lymph nodes, bleeding, bruising  MUSCULOSKELETAL: negative for back pain, joint pain, joint stiffness, joint swelling, muscle pain, muscle weakness  NEUROLOGICAL: negative for dizzy/vertigo, headache, focal weakness, numbness/tingling, speech problems, loss of consciousness, confusion, memory loss  BEHAVORIAL/PSYCH: negative for anxiety, depression, psychosis  ENDOCRINE: negative for polydipsia/polyuria, palpitations, skin changes, temperature intolerance, unexpected weight changes  ALLERGIC/IMMUNOLOGIC: negative for urticaria, hay fever, angioedema      Objective:    Vital Signs:  Vitals:    05/01/19 1240   BP: 120/78   Pulse: 80   Resp: 10   Weight: 103 kg (227 lb)   Height: 5' 5" (1.651 m)         Physical Exam:  General:  alert, cooperative, appears stated age   Skin:  Skin color, texture, turgor normal. No rashes or lesions   HEENT:  conjunctivae/corneas clear. PERRL.   Neck: supple, trachea midline, no adenopathy or thyromegally   Respiratory:  clear to auscultation bilaterally   Heart:  regular rate and rhythm, S1, S2 normal, no murmur, click, rub or gallop   Breasts:  no discharge, erythema, or tenderness   Abdomen:  normal findings: bowel sounds normal, no masses palpable, no organomegaly and soft, non-tender   Pelvis: External genitalia: normal general appearance  Urinary system: urethral meatus normal, bladder nontender  Vaginal: normal mucosa without prolapse or lesions  Cervix: normal appearance  Uterus: normal size, shape, position  Adnexa: normal size, nontender " bilaterally   Extremities: Normal ROM; no edema, no cyanosis   Neurologial: Normal strength and tone. No focal numbness or weakness. Reflexes 2+ and equal.   Psychiatric: normal mood, speech, dress, and thought processes         Assessment:       Healthy female exam.     1. Well woman exam with routine gynecological exam    2. Screening for breast cancer    3. Premature ovarian failure    4. Osteopenia determined by x-ray    5. Hormone replacement therapy (HRT)          Plan:        Prempro refill sent   Discussed weight bearing exercise, calcium, and vitamin d for osteoporosis prevention  DEXA scan next year   Pap smear deferred; will need repeating with HPV cotesting next year  RTC in 1 year or prn    COUNSELING:  Francis was counseled on STD pevention, use and side-effects of various contraceptive measures, A.C.O.G. Pap guidelines and recommendations for yearly pelvic exams in addition to recommendations for monthly self breast exams; to see her PCP for other health maintenance.

## 2019-05-06 ENCOUNTER — TELEPHONE (OUTPATIENT)
Dept: OBSTETRICS AND GYNECOLOGY | Facility: CLINIC | Age: 29
End: 2019-05-06

## 2019-05-06 NOTE — TELEPHONE ENCOUNTER
----- Message from Caryn Thornton sent at 5/6/2019 12:17 PM CDT -----  Contact: Self  Francis Bains  MRN: [id number]  Home Phone      [phone]  Work Phone      Not on file.  Mobile          [phone]    Patient Care Team:  Tereza Magaña MD as PCP - General (Family Medicine)  OB? No  What phone number can you be reached at? [phone]  Message:   Pt called via  service stating that she was seen last week by Dr. Lamb. States she came in to discuss an increase in her Prempro, but when she received the new medication it was actually a decrease in dosage. Worries that doctor may have been in a rush to get to a delivery and wasn't able to properly communicate her need for an increase in medication. Please advise.

## 2019-05-09 NOTE — TELEPHONE ENCOUNTER
Pt states that she was taking (1) 2.5mg and (1) 1.5 mg prempro before her last visit 05/01/2019. Pt states that she was under the impression she would be getting an increase in medication however she was only prescribed the 2.5mg. Pls adv.

## 2019-05-10 NOTE — TELEPHONE ENCOUNTER
Let patient know I would like to try her getting the estrogen injections with oral progesterone and we can see if that helps control her symptoms better than taking a higher dose of the pills than is supposed to be prescribed.

## 2019-05-14 NOTE — TELEPHONE ENCOUNTER
Patient notified via . Patient inquiring if Depo Estradiol injection is safe to use with her kidney problems. Dr. Lamb notified and instructed that it is safe to use. Patient notified and scheduled for nurse visit for injection on Friday.

## 2019-05-17 ENCOUNTER — CLINICAL SUPPORT (OUTPATIENT)
Dept: OBSTETRICS AND GYNECOLOGY | Facility: CLINIC | Age: 29
End: 2019-05-17
Payer: MEDICAID

## 2019-05-17 DIAGNOSIS — Z79.890 HORMONE REPLACEMENT THERAPY (HRT): Primary | ICD-10-CM

## 2019-05-17 PROCEDURE — 96372 THER/PROPH/DIAG INJ SC/IM: CPT | Mod: PBBFAC

## 2019-05-17 RX ADMIN — ESTRADIOL CYPIONATE 5 MG: 5 INJECTION INTRAMUSCULAR at 02:05

## 2019-05-17 NOTE — PROGRESS NOTES
Depo-Estradiol 5mg administered IM to RUOG as ordered. Patient waited for 15 minutes in clinic with no reaction noted.

## 2019-05-20 ENCOUNTER — PATIENT MESSAGE (OUTPATIENT)
Dept: OBSTETRICS AND GYNECOLOGY | Facility: CLINIC | Age: 29
End: 2019-05-20

## 2019-06-05 ENCOUNTER — PATIENT MESSAGE (OUTPATIENT)
Dept: OBSTETRICS AND GYNECOLOGY | Facility: CLINIC | Age: 29
End: 2019-06-05

## 2019-06-05 ENCOUNTER — TELEPHONE (OUTPATIENT)
Dept: OBSTETRICS AND GYNECOLOGY | Facility: CLINIC | Age: 29
End: 2019-06-05

## 2019-06-05 NOTE — TELEPHONE ENCOUNTER
----- Message from Bessie Marley MA sent at 6/5/2019 11:34 AM CDT -----      ----- Message -----  From: Niya Bosch  Sent: 6/5/2019  11:16 AM  To: [facility] Staff    Pt states she has not had a cycle in 4 years and has recently started bleeding. Pt is concerned and states Dr. Lamb knows her condition. Pt can be contacted at [phone].

## 2019-06-18 ENCOUNTER — CLINICAL SUPPORT (OUTPATIENT)
Dept: OBSTETRICS AND GYNECOLOGY | Facility: CLINIC | Age: 29
End: 2019-06-18
Payer: MEDICAID

## 2019-06-18 PROCEDURE — 96372 THER/PROPH/DIAG INJ SC/IM: CPT | Mod: PBBFAC

## 2019-06-18 RX ADMIN — ESTRADIOL CYPIONATE 5 MG: 5 INJECTION INTRAMUSCULAR at 01:06

## 2019-06-18 NOTE — PROGRESS NOTES
Depo Estradiol given in RUOGQ per order, no reaction noted. Pt instructed to wait 20 minutes after injection administration. Verbalized Understanding.

## 2019-07-02 ENCOUNTER — PATIENT MESSAGE (OUTPATIENT)
Dept: OBSTETRICS AND GYNECOLOGY | Facility: CLINIC | Age: 29
End: 2019-07-02

## 2019-07-18 ENCOUNTER — CLINICAL SUPPORT (OUTPATIENT)
Dept: OBSTETRICS AND GYNECOLOGY | Facility: CLINIC | Age: 29
End: 2019-07-18
Payer: MEDICAID

## 2019-07-18 PROCEDURE — 96372 THER/PROPH/DIAG INJ SC/IM: CPT | Mod: PBBFAC

## 2019-07-18 RX ADMIN — ESTRADIOL CYPIONATE 5 MG: 5 INJECTION INTRAMUSCULAR at 02:07

## 2019-07-18 NOTE — PROGRESS NOTES
Depo-Estradiol 5 mg administered IM LUOG as ordered. Patient waited 15 mins. In clinic with no reaction noted.

## 2019-08-01 ENCOUNTER — PATIENT MESSAGE (OUTPATIENT)
Dept: OBSTETRICS AND GYNECOLOGY | Facility: CLINIC | Age: 29
End: 2019-08-01

## 2019-08-20 ENCOUNTER — TELEPHONE (OUTPATIENT)
Dept: OBSTETRICS AND GYNECOLOGY | Facility: CLINIC | Age: 29
End: 2019-08-20

## 2019-08-20 ENCOUNTER — CLINICAL SUPPORT (OUTPATIENT)
Dept: OBSTETRICS AND GYNECOLOGY | Facility: CLINIC | Age: 29
End: 2019-08-20
Payer: MEDICAID

## 2019-08-20 PROCEDURE — 96372 THER/PROPH/DIAG INJ SC/IM: CPT | Mod: PBBFAC

## 2019-08-20 RX ADMIN — ESTRADIOL CYPIONATE 5 MG: 5 INJECTION INTRAMUSCULAR at 01:08

## 2019-08-20 NOTE — TELEPHONE ENCOUNTER
Pt presents today in clinic for Depo Estradiol inj. Pt reports that since she started this injection she is having a period every 2 weeks last between 7-10 days each time. Pt would like to know if this is appropriate. Pt would like to be contacted via pt portal. Pls adv.

## 2019-08-21 ENCOUNTER — PATIENT MESSAGE (OUTPATIENT)
Dept: OBSTETRICS AND GYNECOLOGY | Facility: CLINIC | Age: 29
End: 2019-08-21

## 2019-09-11 ENCOUNTER — PATIENT MESSAGE (OUTPATIENT)
Dept: OBSTETRICS AND GYNECOLOGY | Facility: CLINIC | Age: 29
End: 2019-09-11

## 2019-09-18 ENCOUNTER — CLINICAL SUPPORT (OUTPATIENT)
Dept: OBSTETRICS AND GYNECOLOGY | Facility: CLINIC | Age: 29
End: 2019-09-18
Payer: MEDICAID

## 2019-09-18 PROCEDURE — 96372 THER/PROPH/DIAG INJ SC/IM: CPT | Mod: PBBFAC

## 2019-09-18 RX ADMIN — ESTRADIOL CYPIONATE 5 MG: 5 INJECTION INTRAMUSCULAR at 02:09

## 2019-09-18 NOTE — PROGRESS NOTES
Pt here for estradiol injection given in her upper left gluteal using aseptic technique. Pt tolerated well no complaints voiced .

## 2019-10-11 ENCOUNTER — PATIENT MESSAGE (OUTPATIENT)
Dept: OBSTETRICS AND GYNECOLOGY | Facility: CLINIC | Age: 29
End: 2019-10-11

## 2019-10-16 ENCOUNTER — PATIENT MESSAGE (OUTPATIENT)
Dept: OBSTETRICS AND GYNECOLOGY | Facility: CLINIC | Age: 29
End: 2019-10-16

## 2019-10-22 ENCOUNTER — PATIENT MESSAGE (OUTPATIENT)
Dept: OBSTETRICS AND GYNECOLOGY | Facility: CLINIC | Age: 29
End: 2019-10-22

## 2019-11-22 ENCOUNTER — OFFICE VISIT (OUTPATIENT)
Dept: OBSTETRICS AND GYNECOLOGY | Facility: CLINIC | Age: 29
End: 2019-11-22
Payer: MEDICAID

## 2019-11-22 VITALS
RESPIRATION RATE: 18 BRPM | HEIGHT: 65 IN | BODY MASS INDEX: 36.99 KG/M2 | WEIGHT: 222 LBS | SYSTOLIC BLOOD PRESSURE: 136 MMHG | DIASTOLIC BLOOD PRESSURE: 86 MMHG

## 2019-11-22 DIAGNOSIS — E28.39 PREMATURE OVARIAN FAILURE: ICD-10-CM

## 2019-11-22 DIAGNOSIS — Z79.890 HORMONE REPLACEMENT THERAPY (HRT): Primary | ICD-10-CM

## 2019-11-22 PROCEDURE — 99213 PR OFFICE/OUTPT VISIT, EST, LEVL III, 20-29 MIN: ICD-10-PCS | Mod: S$PBB,,, | Performed by: OBSTETRICS & GYNECOLOGY

## 2019-11-22 PROCEDURE — 99999 PR PBB SHADOW E&M-EST. PATIENT-LVL III: CPT | Mod: PBBFAC,,, | Performed by: OBSTETRICS & GYNECOLOGY

## 2019-11-22 PROCEDURE — 99213 OFFICE O/P EST LOW 20 MIN: CPT | Mod: S$PBB,,, | Performed by: OBSTETRICS & GYNECOLOGY

## 2019-11-22 PROCEDURE — 99213 OFFICE O/P EST LOW 20 MIN: CPT | Mod: PBBFAC | Performed by: OBSTETRICS & GYNECOLOGY

## 2019-11-22 PROCEDURE — 99999 PR PBB SHADOW E&M-EST. PATIENT-LVL III: ICD-10-PCS | Mod: PBBFAC,,, | Performed by: OBSTETRICS & GYNECOLOGY

## 2019-11-22 RX ORDER — MEDROXYPROGESTERONE ACETATE 5 MG/1
5 TABLET ORAL DAILY
Qty: 30 TABLET | Refills: 11 | Status: SHIPPED | OUTPATIENT
Start: 2019-11-22 | End: 2021-03-18

## 2019-11-22 RX ORDER — ERGOCALCIFEROL 1.25 MG/1
CAPSULE ORAL
COMMUNITY
End: 2024-03-20

## 2019-11-22 RX ORDER — FAMOTIDINE 20 MG/1
TABLET, FILM COATED ORAL
Refills: 4 | COMMUNITY
Start: 2019-10-25 | End: 2024-03-20

## 2019-11-22 RX ORDER — CARVEDILOL 6.25 MG/1
12.5 TABLET ORAL DAILY
Refills: 2 | COMMUNITY
Start: 2019-11-08

## 2019-11-22 RX ORDER — LOSARTAN POTASSIUM 50 MG/1
TABLET ORAL
Status: ON HOLD | COMMUNITY
End: 2022-05-20 | Stop reason: HOSPADM

## 2019-11-22 RX ORDER — CLONAZEPAM 0.5 MG/1
1 TABLET ORAL
COMMUNITY
End: 2021-10-01 | Stop reason: ALTCHOICE

## 2019-11-22 RX ADMIN — ESTRADIOL CYPIONATE 5 MG: 5 INJECTION INTRAMUSCULAR at 02:11

## 2019-11-22 NOTE — PROGRESS NOTES
Subjective:    Patient ID: Francis Bains is a 29 y.o. y.o. female.     Chief Complaint:   Chief Complaint   Patient presents with    Vaginal Bleeding     menses every 2 weeks 5-8 days       History of Present Illness:  Francis presents today reporting she stopped her Estradiol IM injections because she started having cycles again. She reports it is occurring every 2 weeks. She is now experiencing decreased sex dry, vaginal dryness, and fatigue because she is not on her estrogen. Reviewed chart and seems patient may have been taking her Prempro and not provera.       ROS:   Review of Systems   Constitutional: Positive for fatigue. Negative for activity change, appetite change, chills, diaphoresis, fever and unexpected weight change.   HENT: Negative for mouth sores and tinnitus.    Eyes: Negative for discharge and visual disturbance.   Respiratory: Negative for cough, shortness of breath and wheezing.    Cardiovascular: Negative for chest pain, palpitations and leg swelling.   Gastrointestinal: Negative for abdominal pain, bloating, blood in stool, constipation, diarrhea, nausea and vomiting.   Endocrine: Negative for diabetes, hair loss, hot flashes, hyperthyroidism and hypothyroidism.   Genitourinary: Positive for decreased libido and vaginal dryness. Negative for dysuria, flank pain, frequency, genital sores, hematuria, urgency, vaginal bleeding, vaginal discharge, vaginal pain, postcoital bleeding and vaginal odor.   Musculoskeletal: Negative for back pain, joint swelling and myalgias.   Integumentary:  Negative for rash, acne, breast mass, nipple discharge and breast skin changes.   Neurological: Negative for seizures, syncope, numbness and headaches.   Hematological: Negative for adenopathy. Does not bruise/bleed easily.   Psychiatric/Behavioral: Negative for depression and sleep disturbance. The patient is not nervous/anxious.    Breast: Negative for mass, mastodynia, nipple discharge and skin  changes          Objective:    Vital Signs:  Vitals:    11/22/19 1323   BP: 136/86   Resp: 18       Physical Exam:  General:  alert, cooperative, no distress   Head Normocephalic, without obvious abnormality, atraumatic   Neck .supple, symmetrical, trachea midline   Skin:  Skin color, texture, turgor normal. No rashes or lesions   Abdomen:  soft, non-tender; bowel sounds normal   Pelvis: deferred   Extremities extremities normal, atraumatic, no cyanosis or edema   Neurologic Grossly normal        Assessment:      1. Hormone replacement therapy (HRT)    2. Premature ovarian failure          Plan:      PLAN:   1. Restart Depo-Estradiol   2. Rx of Provera 5 mg daily; if this doesn't help after 2-3 months, decrease to 10 days per month; if no improvement in bleeding then, will consider IUD

## 2020-02-03 ENCOUNTER — PATIENT MESSAGE (OUTPATIENT)
Dept: OBSTETRICS AND GYNECOLOGY | Facility: CLINIC | Age: 30
End: 2020-02-03

## 2020-02-06 ENCOUNTER — DOCUMENTATION ONLY (OUTPATIENT)
Dept: AUDIOLOGY | Facility: CLINIC | Age: 30
End: 2020-02-06

## 2020-02-06 NOTE — PROGRESS NOTES
Cochlear Implant Information:    Date of Implantation: 3/16/2010  Implant Model: Cochlear Nucleus  cochlear implant with Contour Advance electrode  Serial Number: [id number]

## 2020-04-04 ENCOUNTER — PATIENT MESSAGE (OUTPATIENT)
Dept: AUDIOLOGY | Facility: CLINIC | Age: 30
End: 2020-04-04

## 2020-04-25 ENCOUNTER — PATIENT MESSAGE (OUTPATIENT)
Dept: OBSTETRICS AND GYNECOLOGY | Facility: CLINIC | Age: 30
End: 2020-04-25

## 2020-08-21 ENCOUNTER — TELEPHONE (OUTPATIENT)
Dept: AUDIOLOGY | Facility: CLINIC | Age: 30
End: 2020-08-21

## 2020-08-21 DIAGNOSIS — H90.3 SENSORINEURAL HEARING LOSS, BILATERAL: Primary | ICD-10-CM

## 2020-09-22 ENCOUNTER — OFFICE VISIT (OUTPATIENT)
Dept: OBSTETRICS AND GYNECOLOGY | Facility: CLINIC | Age: 30
End: 2020-09-22
Payer: MEDICAID

## 2020-09-22 VITALS
SYSTOLIC BLOOD PRESSURE: 138 MMHG | BODY MASS INDEX: 39.49 KG/M2 | RESPIRATION RATE: 16 BRPM | DIASTOLIC BLOOD PRESSURE: 110 MMHG | HEART RATE: 78 BPM | HEIGHT: 65 IN | WEIGHT: 237 LBS

## 2020-09-22 DIAGNOSIS — E28.39 PREMATURE OVARIAN FAILURE: ICD-10-CM

## 2020-09-22 DIAGNOSIS — Z79.890 HORMONE REPLACEMENT THERAPY (HRT): Primary | ICD-10-CM

## 2020-09-22 DIAGNOSIS — N94.10 DYSPAREUNIA, FEMALE: ICD-10-CM

## 2020-09-22 PROCEDURE — 99999 PR PBB SHADOW E&M-EST. PATIENT-LVL IV: ICD-10-PCS | Mod: PBBFAC,,, | Performed by: OBSTETRICS & GYNECOLOGY

## 2020-09-22 PROCEDURE — 99999 PR PBB SHADOW E&M-EST. PATIENT-LVL IV: CPT | Mod: PBBFAC,,, | Performed by: OBSTETRICS & GYNECOLOGY

## 2020-09-22 PROCEDURE — 99214 OFFICE O/P EST MOD 30 MIN: CPT | Mod: PBBFAC | Performed by: OBSTETRICS & GYNECOLOGY

## 2020-09-22 PROCEDURE — 99213 PR OFFICE/OUTPT VISIT, EST, LEVL III, 20-29 MIN: ICD-10-PCS | Mod: S$PBB,,, | Performed by: OBSTETRICS & GYNECOLOGY

## 2020-09-22 PROCEDURE — 99213 OFFICE O/P EST LOW 20 MIN: CPT | Mod: S$PBB,,, | Performed by: OBSTETRICS & GYNECOLOGY

## 2020-09-22 RX ORDER — MECLIZINE HYDROCHLORIDE 25 MG/1
TABLET ORAL
COMMUNITY
End: 2021-12-22

## 2020-09-22 RX ORDER — CONJUGATED ESTROGENS AND MEDROXYPROGESTERONE ACETATE .625; 2.5 MG/1; MG/1
1 TABLET, SUGAR COATED ORAL DAILY
Qty: 30 TABLET | Refills: 11 | Status: SHIPPED | OUTPATIENT
Start: 2020-09-22 | End: 2021-09-12

## 2020-09-22 RX ORDER — PRASTERONE 6.5 MG/1
6.5 INSERT VAGINAL NIGHTLY
Qty: 30 EACH | Refills: 11 | Status: SHIPPED | OUTPATIENT
Start: 2020-09-22 | End: 2021-03-18

## 2020-09-22 RX ORDER — TRAZODONE HYDROCHLORIDE 50 MG/1
50 TABLET ORAL NIGHTLY PRN
COMMUNITY

## 2020-09-22 RX ORDER — ALBUTEROL SULFATE 90 UG/1
AEROSOL, METERED RESPIRATORY (INHALATION)
COMMUNITY
End: 2022-02-11

## 2020-09-22 RX ORDER — IRON POLYSACCHARIDE COMPLEX 150 MG
CAPSULE ORAL
COMMUNITY
End: 2021-12-22

## 2020-09-22 RX ORDER — PANTOPRAZOLE SODIUM 40 MG/1
40 TABLET, DELAYED RELEASE ORAL DAILY
COMMUNITY
Start: 2020-07-28

## 2020-09-22 NOTE — PROGRESS NOTES
Subjective:    Patient ID: Francis Bains is a 30 y.o. y.o. female.     Chief Complaint:   Chief Complaint   Patient presents with    Decreased sex drive    Agitation    Dizziness       History of Present Illness:  Francis presents today complaining of decreased sex drive, vaginal dryness and hot flashes on her IM estradiol with oral progesterone. She is taking the progesterone daily. She reports having cycles which is not happy about. She is using coconut oil with no improvement in vaginal dryness. She reports the Prempro worked better to control her symptoms and would like to switch back to that.         ROS:   Review of Systems   Constitutional: Negative for activity change, appetite change, chills, diaphoresis, fatigue, fever and unexpected weight change.   HENT: Negative for mouth sores and tinnitus.    Eyes: Negative for discharge and visual disturbance.   Respiratory: Negative for cough, shortness of breath and wheezing.    Cardiovascular: Negative for chest pain, palpitations and leg swelling.   Gastrointestinal: Negative for abdominal pain, bloating, blood in stool, constipation, diarrhea, nausea and vomiting.   Endocrine: Positive for hot flashes. Negative for diabetes, hair loss, hyperthyroidism and hypothyroidism.   Genitourinary: Positive for decreased libido, dyspareunia and vaginal dryness. Negative for dysuria, flank pain, frequency, genital sores, hematuria, urgency, vaginal bleeding, vaginal discharge, vaginal pain, postcoital bleeding and vaginal odor.   Musculoskeletal: Negative for back pain, joint swelling and myalgias.   Integumentary:  Negative for rash, acne, breast mass, nipple discharge and breast skin changes.   Neurological: Negative for seizures, syncope, numbness and headaches.   Hematological: Negative for adenopathy. Does not bruise/bleed easily.   Psychiatric/Behavioral: Negative for depression and sleep disturbance. The patient is not nervous/anxious.    Breast: Negative for  mass, mastodynia, nipple discharge and skin changes          Objective:    Vital Signs:  Vitals:    09/22/20 1528   BP: (!) 138/110   Pulse: 78   Resp: 16       Physical Exam:  General:  alert, cooperative, no distress   Head Normocephalic, without obvious abnormality, atraumatic   Neck .supple, symmetrical, trachea midline   Skin:  Skin color, texture, turgor normal. No rashes or lesions   Abdomen:  soft, non-tender; bowel sounds normal   Pelvis: deferred   Extremities extremities normal, atraumatic, no cyanosis or edema   Neurologic Grossly normal          Assessment:      1. Hormone replacement therapy (HRT)    2. Premature ovarian failure    3. Dyspareunia, female          Plan:      PLAN:   1. Rx of Prempro  2. Rx of intrarosa  3. RTC in 2 months; if no improvement in sex drive will consider injections

## 2021-03-18 ENCOUNTER — OFFICE VISIT (OUTPATIENT)
Dept: OBSTETRICS AND GYNECOLOGY | Facility: CLINIC | Age: 31
End: 2021-03-18
Payer: MEDICAID

## 2021-03-18 VITALS
BODY MASS INDEX: 39.55 KG/M2 | HEIGHT: 65 IN | DIASTOLIC BLOOD PRESSURE: 74 MMHG | HEART RATE: 81 BPM | SYSTOLIC BLOOD PRESSURE: 126 MMHG | WEIGHT: 237.38 LBS | RESPIRATION RATE: 16 BRPM

## 2021-03-18 DIAGNOSIS — N76.0 VULVOVAGINITIS: Primary | ICD-10-CM

## 2021-03-18 PROCEDURE — 99213 OFFICE O/P EST LOW 20 MIN: CPT | Mod: S$PBB,,, | Performed by: OBSTETRICS & GYNECOLOGY

## 2021-03-18 PROCEDURE — 87481 CANDIDA DNA AMP PROBE: CPT | Mod: 59 | Performed by: OBSTETRICS & GYNECOLOGY

## 2021-03-18 PROCEDURE — 99999 PR PBB SHADOW E&M-EST. PATIENT-LVL III: ICD-10-PCS | Mod: PBBFAC,,, | Performed by: OBSTETRICS & GYNECOLOGY

## 2021-03-18 PROCEDURE — 99213 OFFICE O/P EST LOW 20 MIN: CPT | Mod: PBBFAC | Performed by: OBSTETRICS & GYNECOLOGY

## 2021-03-18 PROCEDURE — 99999 PR PBB SHADOW E&M-EST. PATIENT-LVL III: CPT | Mod: PBBFAC,,, | Performed by: OBSTETRICS & GYNECOLOGY

## 2021-03-18 PROCEDURE — 99213 PR OFFICE/OUTPT VISIT, EST, LEVL III, 20-29 MIN: ICD-10-PCS | Mod: S$PBB,,, | Performed by: OBSTETRICS & GYNECOLOGY

## 2021-03-18 PROCEDURE — 87661 TRICHOMONAS VAGINALIS AMPLIF: CPT | Mod: 59 | Performed by: OBSTETRICS & GYNECOLOGY

## 2021-03-18 RX ORDER — NYSTATIN AND TRIAMCINOLONE ACETONIDE 100000; 1 [USP'U]/G; MG/G
CREAM TOPICAL
Qty: 30 G | Refills: 1 | Status: SHIPPED | OUTPATIENT
Start: 2021-03-18 | End: 2021-10-01 | Stop reason: ALTCHOICE

## 2021-03-22 LAB
BACTERIAL VAGINOSIS DNA: NEGATIVE
CANDIDA GLABRATA DNA: NEGATIVE
CANDIDA KRUSEI DNA: NEGATIVE
CANDIDA RRNA VAG QL PROBE: NEGATIVE
T VAGINALIS RRNA GENITAL QL PROBE: POSITIVE

## 2021-03-22 RX ORDER — METRONIDAZOLE 500 MG/1
2000 TABLET ORAL DAILY
Qty: 4 TABLET | Refills: 0 | Status: SHIPPED | OUTPATIENT
Start: 2021-03-22 | End: 2021-03-23

## 2021-03-23 ENCOUNTER — PATIENT MESSAGE (OUTPATIENT)
Dept: OBSTETRICS AND GYNECOLOGY | Facility: CLINIC | Age: 31
End: 2021-03-23

## 2021-05-04 ENCOUNTER — PATIENT MESSAGE (OUTPATIENT)
Dept: RESEARCH | Facility: HOSPITAL | Age: 31
End: 2021-05-04

## 2021-09-11 DIAGNOSIS — N94.10 DYSPAREUNIA, FEMALE: ICD-10-CM

## 2021-09-11 DIAGNOSIS — Z79.890 HORMONE REPLACEMENT THERAPY (HRT): ICD-10-CM

## 2021-09-11 DIAGNOSIS — E28.39 PREMATURE OVARIAN FAILURE: ICD-10-CM

## 2021-09-12 ENCOUNTER — PATIENT MESSAGE (OUTPATIENT)
Dept: OBSTETRICS AND GYNECOLOGY | Facility: CLINIC | Age: 31
End: 2021-09-12

## 2021-09-12 DIAGNOSIS — E28.39 PREMATURE OVARIAN FAILURE: ICD-10-CM

## 2021-09-12 DIAGNOSIS — Z79.890 HORMONE REPLACEMENT THERAPY (HRT): ICD-10-CM

## 2021-09-12 DIAGNOSIS — N94.10 DYSPAREUNIA, FEMALE: ICD-10-CM

## 2021-09-12 RX ORDER — CONJUGATED ESTROGENS AND MEDROXYPROGESTERONE ACETATE .625; 2.5 MG/1; MG/1
TABLET, SUGAR COATED ORAL
Qty: 28 TABLET | Refills: 0 | Status: SHIPPED | OUTPATIENT
Start: 2021-09-12 | End: 2021-09-13 | Stop reason: SDUPTHER

## 2021-09-13 ENCOUNTER — TELEPHONE (OUTPATIENT)
Dept: PSYCHIATRY | Facility: CLINIC | Age: 31
End: 2021-09-13

## 2021-09-14 RX ORDER — CONJUGATED ESTROGENS AND MEDROXYPROGESTERONE ACETATE .625; 2.5 MG/1; MG/1
1 TABLET, SUGAR COATED ORAL DAILY
Qty: 28 TABLET | Refills: 0 | Status: SHIPPED | OUTPATIENT
Start: 2021-09-14 | End: 2021-10-15

## 2021-09-15 ENCOUNTER — TELEPHONE (OUTPATIENT)
Dept: OBSTETRICS AND GYNECOLOGY | Facility: CLINIC | Age: 31
End: 2021-09-15

## 2021-09-16 ENCOUNTER — PATIENT OUTREACH (OUTPATIENT)
Dept: ADMINISTRATIVE | Facility: HOSPITAL | Age: 31
End: 2021-09-16

## 2021-10-01 ENCOUNTER — OFFICE VISIT (OUTPATIENT)
Dept: PRIMARY CARE CLINIC | Facility: CLINIC | Age: 31
End: 2021-10-01
Payer: MEDICAID

## 2021-10-01 VITALS
DIASTOLIC BLOOD PRESSURE: 89 MMHG | HEART RATE: 74 BPM | OXYGEN SATURATION: 99 % | WEIGHT: 242.63 LBS | TEMPERATURE: 98 F | HEIGHT: 64 IN | BODY MASS INDEX: 41.42 KG/M2 | SYSTOLIC BLOOD PRESSURE: 154 MMHG

## 2021-10-01 DIAGNOSIS — G80.9 INFANTILE CEREBRAL PALSY: Primary | ICD-10-CM

## 2021-10-01 DIAGNOSIS — I10 ESSENTIAL HYPERTENSION: ICD-10-CM

## 2021-10-01 DIAGNOSIS — F41.1 GAD (GENERALIZED ANXIETY DISORDER): ICD-10-CM

## 2021-10-01 DIAGNOSIS — M54.50 CHRONIC BILATERAL LOW BACK PAIN WITHOUT SCIATICA: ICD-10-CM

## 2021-10-01 DIAGNOSIS — F43.10 PTSD (POST-TRAUMATIC STRESS DISORDER): ICD-10-CM

## 2021-10-01 DIAGNOSIS — G89.29 CHRONIC BILATERAL LOW BACK PAIN WITHOUT SCIATICA: ICD-10-CM

## 2021-10-01 DIAGNOSIS — I12.9 CKD STAGE 4 SECONDARY TO HYPERTENSION: ICD-10-CM

## 2021-10-01 DIAGNOSIS — G89.4 PAIN SYNDROME, CHRONIC: ICD-10-CM

## 2021-10-01 DIAGNOSIS — N18.4 CKD STAGE 4 SECONDARY TO HYPERTENSION: ICD-10-CM

## 2021-10-01 PROCEDURE — 99204 OFFICE O/P NEW MOD 45 MIN: CPT | Mod: S$PBB,,, | Performed by: FAMILY MEDICINE

## 2021-10-01 PROCEDURE — 99204 PR OFFICE/OUTPT VISIT, NEW, LEVL IV, 45-59 MIN: ICD-10-PCS | Mod: S$PBB,,, | Performed by: FAMILY MEDICINE

## 2021-10-01 PROCEDURE — 99999 PR PBB SHADOW E&M-EST. PATIENT-LVL IV: ICD-10-PCS | Mod: PBBFAC,,, | Performed by: FAMILY MEDICINE

## 2021-10-01 PROCEDURE — 99214 OFFICE O/P EST MOD 30 MIN: CPT | Mod: PBBFAC,PN | Performed by: FAMILY MEDICINE

## 2021-10-01 PROCEDURE — 99999 PR PBB SHADOW E&M-EST. PATIENT-LVL IV: CPT | Mod: PBBFAC,,, | Performed by: FAMILY MEDICINE

## 2021-10-01 RX ORDER — CLONAZEPAM 1 MG/1
1 TABLET ORAL DAILY PRN
Qty: 30 TABLET | Refills: 1 | Status: SHIPPED | OUTPATIENT
Start: 2021-10-01 | End: 2022-06-10

## 2021-10-07 ENCOUNTER — PATIENT MESSAGE (OUTPATIENT)
Dept: PRIMARY CARE CLINIC | Facility: CLINIC | Age: 31
End: 2021-10-07

## 2021-10-07 DIAGNOSIS — I12.9 CKD STAGE 4 SECONDARY TO HYPERTENSION: ICD-10-CM

## 2021-10-07 DIAGNOSIS — N18.4 CKD STAGE 4 SECONDARY TO HYPERTENSION: ICD-10-CM

## 2021-10-07 DIAGNOSIS — N18.4 CKD (CHRONIC KIDNEY DISEASE) STAGE 4, GFR 15-29 ML/MIN: ICD-10-CM

## 2021-10-07 DIAGNOSIS — F41.1 GAD (GENERALIZED ANXIETY DISORDER): Primary | ICD-10-CM

## 2021-10-07 DIAGNOSIS — Z79.899 CHRONIC PRESCRIPTION BENZODIAZEPINE USE: ICD-10-CM

## 2021-11-18 ENCOUNTER — HOSPITAL ENCOUNTER (EMERGENCY)
Facility: HOSPITAL | Age: 31
Discharge: HOME | End: 2021-11-18
Attending: EMERGENCY MEDICINE
Payer: MEDICAID

## 2021-11-18 VITALS
DIASTOLIC BLOOD PRESSURE: 76 MMHG | OXYGEN SATURATION: 100 % | RESPIRATION RATE: 18 BRPM | HEART RATE: 82 BPM | BODY MASS INDEX: 39.48 KG/M2 | WEIGHT: 230 LBS | SYSTOLIC BLOOD PRESSURE: 140 MMHG | TEMPERATURE: 98 F

## 2021-11-18 DIAGNOSIS — S80.02XA CONTUSION OF LEFT KNEE, INITIAL ENCOUNTER: ICD-10-CM

## 2021-11-18 DIAGNOSIS — S93.401A SPRAIN OF RIGHT ANKLE, UNSPECIFIED LIGAMENT, INITIAL ENCOUNTER: ICD-10-CM
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
## 2021-11-18 DIAGNOSIS — N94.10 DYSPAREUNIA, FEMALE: ICD-10-CM

## 2021-11-18 DIAGNOSIS — E28.39 PREMATURE OVARIAN FAILURE: ICD-10-CM
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
## 2021-11-18 DIAGNOSIS — Z79.890 HORMONE REPLACEMENT THERAPY (HRT): ICD-10-CM

## 2021-11-18 PROCEDURE — 99284 EMERGENCY DEPT VISIT MOD MDM: CPT | Mod: 25

## 2021-11-18 PROCEDURE — 25000003 PHARM REV CODE 250: Performed by: NURSE PRACTITIONER

## 2021-11-18 RX ORDER — CONJUGATED ESTROGENS AND MEDROXYPROGESTERONE ACETATE .625; 2.5 MG/1; MG/1
1 TABLET, SUGAR COATED ORAL DAILY
Qty: 28 TABLET | Refills: 0 | Status: SHIPPED | OUTPATIENT
Start: 2021-11-18 | End: 2021-12-22 | Stop reason: SDUPTHER

## 2021-11-18 RX ORDER — HYDROCODONE BITARTRATE AND ACETAMINOPHEN 10; 325 MG/1; MG/1
1 TABLET ORAL
Status: COMPLETED | OUTPATIENT
Start: 2021-11-18 | End: 2021-11-18

## 2021-11-18 RX ORDER — TRAMADOL HYDROCHLORIDE 50 MG/1
50 TABLET ORAL EVERY 8 HOURS PRN
Qty: 8 TABLET | Refills: 0 | Status: SHIPPED | OUTPATIENT
Start: 2021-11-18 | End: 2021-11-21

## 2021-11-18 RX ADMIN — HYDROCODONE BITARTRATE AND ACETAMINOPHEN 1 TABLET: 10; 325 TABLET ORAL at 05:11

## 2021-11-22 ENCOUNTER — PATIENT MESSAGE (OUTPATIENT)
Dept: OBSTETRICS AND GYNECOLOGY | Facility: CLINIC | Age: 31
End: 2021-11-22
Payer: MEDICAID

## 2021-12-20 ENCOUNTER — PATIENT OUTREACH (OUTPATIENT)
Dept: ADMINISTRATIVE | Facility: HOSPITAL | Age: 31
End: 2021-12-20
Payer: MEDICAID

## 2021-12-22 ENCOUNTER — OFFICE VISIT (OUTPATIENT)
Dept: OBSTETRICS AND GYNECOLOGY | Facility: CLINIC | Age: 31
End: 2021-12-22
Payer: MEDICAID

## 2021-12-22 VITALS
BODY MASS INDEX: 40.41 KG/M2 | SYSTOLIC BLOOD PRESSURE: 116 MMHG | HEART RATE: 94 BPM | WEIGHT: 236.69 LBS | DIASTOLIC BLOOD PRESSURE: 84 MMHG | HEIGHT: 64 IN | RESPIRATION RATE: 15 BRPM

## 2021-12-22 DIAGNOSIS — Z79.890 HORMONE REPLACEMENT THERAPY (HRT): ICD-10-CM

## 2021-12-22 DIAGNOSIS — E28.39 PREMATURE OVARIAN FAILURE: ICD-10-CM

## 2021-12-22 DIAGNOSIS — N94.10 DYSPAREUNIA, FEMALE: ICD-10-CM

## 2021-12-22 DIAGNOSIS — Z12.39 ENCOUNTER FOR BREAST CANCER SCREENING USING NON-MAMMOGRAM MODALITY: ICD-10-CM

## 2021-12-22 DIAGNOSIS — Z01.419 WELL WOMAN EXAM WITH ROUTINE GYNECOLOGICAL EXAM: Primary | ICD-10-CM

## 2021-12-22 DIAGNOSIS — M85.80 OSTEOPENIA DETERMINED BY X-RAY: ICD-10-CM

## 2021-12-22 DIAGNOSIS — Z12.4 SCREENING FOR CERVICAL CANCER: ICD-10-CM

## 2021-12-22 PROCEDURE — 88175 CYTOPATH C/V AUTO FLUID REDO: CPT | Performed by: PATHOLOGY

## 2021-12-22 PROCEDURE — 99395 PR PREVENTIVE VISIT,EST,18-39: ICD-10-PCS | Mod: S$PBB,,, | Performed by: OBSTETRICS & GYNECOLOGY

## 2021-12-22 PROCEDURE — 1159F MED LIST DOCD IN RCRD: CPT | Mod: CPTII,,, | Performed by: OBSTETRICS & GYNECOLOGY

## 2021-12-22 PROCEDURE — 3079F DIAST BP 80-89 MM HG: CPT | Mod: CPTII,,, | Performed by: OBSTETRICS & GYNECOLOGY

## 2021-12-22 PROCEDURE — 99999 PR PBB SHADOW E&M-EST. PATIENT-LVL III: CPT | Mod: PBBFAC,,, | Performed by: OBSTETRICS & GYNECOLOGY

## 2021-12-22 PROCEDURE — 87624 HPV HI-RISK TYP POOLED RSLT: CPT | Performed by: OBSTETRICS & GYNECOLOGY

## 2021-12-22 PROCEDURE — 99999 PR PBB SHADOW E&M-EST. PATIENT-LVL III: ICD-10-PCS | Mod: PBBFAC,,, | Performed by: OBSTETRICS & GYNECOLOGY

## 2021-12-22 PROCEDURE — 99213 OFFICE O/P EST LOW 20 MIN: CPT | Mod: PBBFAC | Performed by: OBSTETRICS & GYNECOLOGY

## 2021-12-22 PROCEDURE — 3008F PR BODY MASS INDEX (BMI) DOCUMENTED: ICD-10-PCS | Mod: CPTII,,, | Performed by: OBSTETRICS & GYNECOLOGY

## 2021-12-22 PROCEDURE — 4010F ACE/ARB THERAPY RXD/TAKEN: CPT | Mod: CPTII,,, | Performed by: OBSTETRICS & GYNECOLOGY

## 2021-12-22 PROCEDURE — 3074F PR MOST RECENT SYSTOLIC BLOOD PRESSURE < 130 MM HG: ICD-10-PCS | Mod: CPTII,,, | Performed by: OBSTETRICS & GYNECOLOGY

## 2021-12-22 PROCEDURE — 1159F PR MEDICATION LIST DOCUMENTED IN MEDICAL RECORD: ICD-10-PCS | Mod: CPTII,,, | Performed by: OBSTETRICS & GYNECOLOGY

## 2021-12-22 PROCEDURE — 3079F PR MOST RECENT DIASTOLIC BLOOD PRESSURE 80-89 MM HG: ICD-10-PCS | Mod: CPTII,,, | Performed by: OBSTETRICS & GYNECOLOGY

## 2021-12-22 PROCEDURE — 4010F PR ACE/ARB THEARPY RXD/TAKEN: ICD-10-PCS | Mod: CPTII,,, | Performed by: OBSTETRICS & GYNECOLOGY

## 2021-12-22 PROCEDURE — 88141 CYTOPATH C/V INTERPRET: CPT | Mod: ,,, | Performed by: PATHOLOGY

## 2021-12-22 PROCEDURE — 3074F SYST BP LT 130 MM HG: CPT | Mod: CPTII,,, | Performed by: OBSTETRICS & GYNECOLOGY

## 2021-12-22 PROCEDURE — 88141 PR  CYTOPATH CERV/VAG INTERPRET: ICD-10-PCS | Mod: ,,, | Performed by: PATHOLOGY

## 2021-12-22 PROCEDURE — 3008F BODY MASS INDEX DOCD: CPT | Mod: CPTII,,, | Performed by: OBSTETRICS & GYNECOLOGY

## 2021-12-22 PROCEDURE — 99395 PREV VISIT EST AGE 18-39: CPT | Mod: S$PBB,,, | Performed by: OBSTETRICS & GYNECOLOGY

## 2021-12-22 RX ORDER — CONJUGATED ESTROGENS AND MEDROXYPROGESTERONE ACETATE .625; 2.5 MG/1; MG/1
1 TABLET, SUGAR COATED ORAL DAILY
Qty: 28 TABLET | Refills: 11 | Status: SHIPPED | OUTPATIENT
Start: 2021-12-22 | End: 2022-04-13 | Stop reason: SDUPTHER

## 2022-01-03 ENCOUNTER — APPOINTMENT (OUTPATIENT)
Dept: RADIOLOGY | Facility: HOSPITAL | Age: 32
End: 2022-01-03
Attending: OBSTETRICS & GYNECOLOGY
Payer: MEDICAID

## 2022-01-03 ENCOUNTER — PATIENT MESSAGE (OUTPATIENT)
Dept: OBSTETRICS AND GYNECOLOGY | Facility: CLINIC | Age: 32
End: 2022-01-03
Payer: MEDICAID

## 2022-01-03 DIAGNOSIS — M85.80 OSTEOPENIA DETERMINED BY X-RAY: ICD-10-CM

## 2022-01-03 DIAGNOSIS — E28.39 PREMATURE OVARIAN FAILURE: ICD-10-CM

## 2022-01-03 LAB
FINAL PATHOLOGIC DIAGNOSIS: ABNORMAL
Lab: ABNORMAL

## 2022-01-03 PROCEDURE — 77080 DXA BONE DENSITY AXIAL: CPT | Mod: 26,,, | Performed by: RADIOLOGY

## 2022-01-03 PROCEDURE — 77080 DXA BONE DENSITY AXIAL: CPT | Mod: TC

## 2022-01-03 PROCEDURE — 77080 DEXA BONE DENSITY SPINE HIP: ICD-10-PCS | Mod: 26,,, | Performed by: RADIOLOGY

## 2022-01-05 LAB
HPV HR 12 DNA SPEC QL NAA+PROBE: NEGATIVE
HPV16 AG SPEC QL: NEGATIVE
HPV18 DNA SPEC QL NAA+PROBE: NEGATIVE

## 2022-01-10 NOTE — TELEPHONE ENCOUNTER
Spoke with pts mom this morning and results of papsmear explained in detail so mom can explain to pt. All questioned answered.

## 2022-01-19 ENCOUNTER — PATIENT MESSAGE (OUTPATIENT)
Dept: AUDIOLOGY | Facility: CLINIC | Age: 32
End: 2022-01-19

## 2022-01-19 ENCOUNTER — TELEPHONE (OUTPATIENT)
Dept: AUDIOLOGY | Facility: CLINIC | Age: 32
End: 2022-01-19
Payer: MEDICAID

## 2022-01-19 ENCOUNTER — CLINICAL SUPPORT (OUTPATIENT)
Dept: AUDIOLOGY | Facility: CLINIC | Age: 32
End: 2022-01-19
Payer: MEDICAID

## 2022-01-19 DIAGNOSIS — H90.3 SENSORINEURAL HEARING LOSS, BILATERAL: ICD-10-CM

## 2022-01-19 DIAGNOSIS — H90.3 SENSORINEURAL HEARING LOSS, BILATERAL: Primary | ICD-10-CM

## 2022-01-19 PROCEDURE — 99211 OFF/OP EST MAY X REQ PHY/QHP: CPT | Mod: PBBFAC | Performed by: AUDIOLOGIST

## 2022-01-19 PROCEDURE — 99999 PR PBB SHADOW E&M-EST. PATIENT-LVL I: ICD-10-PCS | Mod: PBBFAC,,, | Performed by: AUDIOLOGIST

## 2022-01-19 PROCEDURE — 99499 UNLISTED E&M SERVICE: CPT | Mod: S$PBB,,, | Performed by: AUDIOLOGIST

## 2022-01-19 PROCEDURE — 99999 PR PBB SHADOW E&M-EST. PATIENT-LVL I: CPT | Mod: PBBFAC,,, | Performed by: AUDIOLOGIST

## 2022-01-19 PROCEDURE — 99499 NO LOS: ICD-10-PCS | Mod: S$PBB,,, | Performed by: AUDIOLOGIST
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
## 2022-01-20 ENCOUNTER — PATIENT MESSAGE (OUTPATIENT)
Dept: OBSTETRICS AND GYNECOLOGY | Facility: CLINIC | Age: 32
End: 2022-01-20
Payer: MEDICAID

## 2022-02-11 ENCOUNTER — OFFICE VISIT (OUTPATIENT)
Dept: OBSTETRICS AND GYNECOLOGY | Facility: CLINIC | Age: 32
End: 2022-02-11
Payer: MEDICAID

## 2022-02-11 VITALS
DIASTOLIC BLOOD PRESSURE: 84 MMHG | WEIGHT: 235.69 LBS | HEIGHT: 64 IN | SYSTOLIC BLOOD PRESSURE: 136 MMHG | BODY MASS INDEX: 40.24 KG/M2 | RESPIRATION RATE: 15 BRPM | HEART RATE: 95 BPM

## 2022-02-11 DIAGNOSIS — M81.8 OTHER OSTEOPOROSIS WITHOUT CURRENT PATHOLOGICAL FRACTURE: Primary | ICD-10-CM

## 2022-02-11 PROCEDURE — 99213 OFFICE O/P EST LOW 20 MIN: CPT | Mod: S$PBB,,, | Performed by: OBSTETRICS & GYNECOLOGY

## 2022-02-11 PROCEDURE — 3079F PR MOST RECENT DIASTOLIC BLOOD PRESSURE 80-89 MM HG: ICD-10-PCS | Mod: CPTII,,, | Performed by: OBSTETRICS & GYNECOLOGY

## 2022-02-11 PROCEDURE — 99999 PR PBB SHADOW E&M-EST. PATIENT-LVL III: CPT | Mod: PBBFAC,,, | Performed by: OBSTETRICS & GYNECOLOGY

## 2022-02-11 PROCEDURE — 99213 PR OFFICE/OUTPT VISIT, EST, LEVL III, 20-29 MIN: ICD-10-PCS | Mod: S$PBB,,, | Performed by: OBSTETRICS & GYNECOLOGY

## 2022-02-11 PROCEDURE — 1159F MED LIST DOCD IN RCRD: CPT | Mod: CPTII,,, | Performed by: OBSTETRICS & GYNECOLOGY

## 2022-02-11 PROCEDURE — 3075F SYST BP GE 130 - 139MM HG: CPT | Mod: CPTII,,, | Performed by: OBSTETRICS & GYNECOLOGY

## 2022-02-11 PROCEDURE — 1159F PR MEDICATION LIST DOCUMENTED IN MEDICAL RECORD: ICD-10-PCS | Mod: CPTII,,, | Performed by: OBSTETRICS & GYNECOLOGY

## 2022-02-11 PROCEDURE — 3008F BODY MASS INDEX DOCD: CPT | Mod: CPTII,,, | Performed by: OBSTETRICS & GYNECOLOGY

## 2022-02-11 PROCEDURE — 3079F DIAST BP 80-89 MM HG: CPT | Mod: CPTII,,, | Performed by: OBSTETRICS & GYNECOLOGY

## 2022-02-11 PROCEDURE — 3075F PR MOST RECENT SYSTOLIC BLOOD PRESS GE 130-139MM HG: ICD-10-PCS | Mod: CPTII,,, | Performed by: OBSTETRICS & GYNECOLOGY

## 2022-02-11 PROCEDURE — 4010F ACE/ARB THERAPY RXD/TAKEN: CPT | Mod: CPTII,,, | Performed by: OBSTETRICS & GYNECOLOGY

## 2022-02-11 PROCEDURE — 99213 OFFICE O/P EST LOW 20 MIN: CPT | Mod: PBBFAC | Performed by: OBSTETRICS & GYNECOLOGY

## 2022-02-11 PROCEDURE — 3008F PR BODY MASS INDEX (BMI) DOCUMENTED: ICD-10-PCS | Mod: CPTII,,, | Performed by: OBSTETRICS & GYNECOLOGY

## 2022-02-11 PROCEDURE — 99999 PR PBB SHADOW E&M-EST. PATIENT-LVL III: ICD-10-PCS | Mod: PBBFAC,,, | Performed by: OBSTETRICS & GYNECOLOGY

## 2022-02-11 PROCEDURE — 4010F PR ACE/ARB THEARPY RXD/TAKEN: ICD-10-PCS | Mod: CPTII,,, | Performed by: OBSTETRICS & GYNECOLOGY

## 2022-02-11 RX ORDER — ALENDRONATE SODIUM 70 MG/1
70 TABLET ORAL
Qty: 4 TABLET | Refills: 11 | Status: SHIPPED | OUTPATIENT
Start: 2022-02-11 | End: 2024-01-23 | Stop reason: SDUPTHER

## 2022-02-11 NOTE — PROGRESS NOTES
Subjective:    Patient ID: Francis Bains is a 31 y.o. y.o. female.     Chief Complaint:   Chief Complaint   Patient presents with    Consult     Discuss recent dexa scan       History of Present Illness:  Francis presents today to review most recent DEXA scan and discuss treatment options.    FINDINGS:  The L1 to L4 vertebral bone mineral density is equal to 1.093 g/cm squared with a T score of -0.7.  There has been no significant change relative to the prior study.     The left femoral neck bone mineral density is equal to 0.640 g/cm squared with a T score of -2.9.  There has been  a -11.8% statistically significant change relative to the prior study.     Impression:     Osteoporosis        ROS:   Review of Systems   Constitutional: Negative for activity change, appetite change, chills, diaphoresis, fatigue, fever and unexpected weight change.   HENT: Negative for mouth sores and tinnitus.    Eyes: Negative for discharge and visual disturbance.   Respiratory: Negative for cough, shortness of breath and wheezing.    Cardiovascular: Negative for chest pain, palpitations and leg swelling.   Gastrointestinal: Negative for abdominal pain, bloating, blood in stool, constipation, diarrhea, nausea and vomiting.   Endocrine: Negative for diabetes, hair loss, hot flashes, hyperthyroidism and hypothyroidism.   Genitourinary: Negative for dysuria, flank pain, frequency, genital sores, hematuria, urgency, vaginal bleeding, vaginal discharge, vaginal pain, postcoital bleeding and vaginal odor.   Musculoskeletal: Negative for back pain, joint swelling and myalgias.   Integumentary:  Negative for rash, acne, breast mass, nipple discharge and breast skin changes.   Neurological: Negative for seizures, syncope, numbness and headaches.   Hematological: Negative for adenopathy. Does not bruise/bleed easily.   Psychiatric/Behavioral: Negative for depression and sleep disturbance. The patient is not nervous/anxious.    Breast:  Negative for mass, mastodynia, nipple discharge and skin changes          Objective:    Vital Signs:  Vitals:    02/11/22 1225   BP: 136/84   Pulse: 95   Resp: 15       Physical Exam:  General:  alert, cooperative, no distress   Head Normocephalic, without obvious abnormality, atraumatic   Neck .supple, symmetrical, trachea midline   Skin:  Skin color, texture, turgor normal. No rashes or lesions   Abdomen:  soft, non-tender; bowel sounds normal   Pelvis: deferred   Extremities extremities normal, atraumatic, no cyanosis or edema   Neurologic Grossly normal          Assessment:      1. Other osteoporosis without current pathological fracture          Plan:      PLAN:   1. Rx of Fosamax

## 2022-02-13 ENCOUNTER — PATIENT MESSAGE (OUTPATIENT)
Dept: AUDIOLOGY | Facility: CLINIC | Age: 32
End: 2022-02-13
Payer: MEDICAID

## 2022-04-11 ENCOUNTER — PATIENT MESSAGE (OUTPATIENT)
Dept: OBSTETRICS AND GYNECOLOGY | Facility: CLINIC | Age: 32
End: 2022-04-11
Payer: MEDICAID

## 2022-04-11 DIAGNOSIS — N94.10 DYSPAREUNIA, FEMALE: ICD-10-CM

## 2022-04-11 DIAGNOSIS — E28.39 PREMATURE OVARIAN FAILURE: ICD-10-CM

## 2022-04-11 DIAGNOSIS — Z79.890 HORMONE REPLACEMENT THERAPY (HRT): ICD-10-CM

## 2022-04-13 RX ORDER — CONJUGATED ESTROGENS AND MEDROXYPROGESTERONE ACETATE .625; 2.5 MG/1; MG/1
1 TABLET, SUGAR COATED ORAL DAILY
Qty: 28 TABLET | Refills: 9 | Status: SHIPPED | OUTPATIENT
Start: 2022-04-13 | End: 2024-01-23 | Stop reason: SDUPTHER

## 2022-04-13 NOTE — TELEPHONE ENCOUNTER
Pt had prescription transferred to [facility] but her refills didn't transfer, pt requesting to re send script with refills to [facility] please.

## 2022-05-18 ENCOUNTER — HOSPITAL ENCOUNTER (OUTPATIENT)
Facility: HOSPITAL | Age: 32
Discharge: HOME | End: 2022-05-20
Attending: SURGERY | Admitting: FAMILY MEDICINE
Payer: MEDICAID

## 2022-05-18 DIAGNOSIS — R53.83 FATIGUE: ICD-10-CM

## 2022-05-18 DIAGNOSIS — N17.9 AKI (ACUTE KIDNEY INJURY): Primary | ICD-10-CM

## 2022-05-18 LAB
ALBUMIN SERPL BCP-MCNC: 3.5 G/DL (ref 3.5–5.2)
ALLENS TEST: ABNORMAL
ALP SERPL-CCNC: 80 U/L (ref 55–135)
ALT SERPL W/O P-5'-P-CCNC: 18 U/L (ref 10–44)
AMPHET+METHAMPHET UR QL: NEGATIVE
ANION GAP SERPL CALC-SCNC: 17 MMOL/L (ref 8–16)
ANION GAP SERPL CALC-SCNC: 18 MMOL/L (ref 8–16)
AST SERPL-CCNC: 39 U/L (ref 10–40)
B-HCG UR QL: NEGATIVE
BARBITURATES UR QL SCN>200 NG/ML: NEGATIVE
BASOPHILS # BLD AUTO: 0.02 K/UL (ref 0–0.2)
BASOPHILS NFR BLD: 0.3 % (ref 0–1.9)
BENZODIAZ UR QL SCN>200 NG/ML: ABNORMAL
BILIRUB SERPL-MCNC: 0.3 MG/DL (ref 0.1–1)
BILIRUB UR QL STRIP: ABNORMAL
BNP SERPL-MCNC: <10 PG/ML (ref 0–99)
BUN SERPL-MCNC: 44 MG/DL (ref 6–20)
BUN SERPL-MCNC: 52 MG/DL (ref 6–20)
BZE UR QL SCN: NEGATIVE
CALCIUM SERPL-MCNC: 7.9 MG/DL (ref 8.7–10.5)
CALCIUM SERPL-MCNC: 9.4 MG/DL (ref 8.7–10.5)
CANNABINOIDS UR QL SCN: ABNORMAL
CHLORIDE SERPL-SCNC: 105 MMOL/L (ref 95–110)
CHLORIDE SERPL-SCNC: 111 MMOL/L (ref 95–110)
CK MB SERPL-MCNC: 5.1 NG/ML (ref 0.1–6.5)
CK MB SERPL-RTO: 1.3 % (ref 0–5)
CK SERPL-CCNC: 378 U/L (ref 20–180)
CK SERPL-CCNC: 378 U/L (ref 20–180)
CLARITY UR: CLEAR
CO2 SERPL-SCNC: 13 MMOL/L (ref 23–29)
CO2 SERPL-SCNC: 8 MMOL/L (ref 23–29)
COLOR UR: YELLOW
CREAT SERPL-MCNC: 3.4 MG/DL (ref 0.5–1.4)
CREAT SERPL-MCNC: 4.2 MG/DL (ref 0.5–1.4)
CREAT UR-MCNC: 161.9 MG/DL (ref 15–325)
DELSYS: ABNORMAL
DIFFERENTIAL METHOD: ABNORMAL
EOSINOPHIL # BLD AUTO: 0.1 K/UL (ref 0–0.5)
EOSINOPHIL NFR BLD: 1.9 % (ref 0–8)
ERYTHROCYTE [DISTWIDTH] IN BLOOD BY AUTOMATED COUNT: 14.7 % (ref 11.5–14.5)
EST. GFR  (AFRICAN AMERICAN): 15 ML/MIN/1.73 M^2
EST. GFR  (AFRICAN AMERICAN): 20 ML/MIN/1.73 M^2
EST. GFR  (NON AFRICAN AMERICAN): 13 ML/MIN/1.73 M^2
EST. GFR  (NON AFRICAN AMERICAN): 17 ML/MIN/1.73 M^2
FIO2: 21 (ref 21–100)
GLUCOSE SERPL-MCNC: 85 MG/DL (ref 70–110)
GLUCOSE SERPL-MCNC: 99 MG/DL (ref 70–110)
GLUCOSE UR QL STRIP: NEGATIVE
GROUP A STREP, MOLECULAR: NEGATIVE
HCO3 UR-SCNC: 8.1 MMOL/L
HCT VFR BLD AUTO: 33.2 % (ref 37–48.5)
HETEROPH AB SERPL QL IA: NEGATIVE
HGB BLD-MCNC: 11.3 G/DL (ref 12–16)
HGB UR QL STRIP: NEGATIVE
IMM GRANULOCYTES # BLD AUTO: 0.02 K/UL (ref 0–0.04)
IMM GRANULOCYTES NFR BLD AUTO: 0.3 % (ref 0–0.5)
INFLUENZA A, MOLECULAR: NEGATIVE
INFLUENZA B, MOLECULAR: NEGATIVE
KETONES UR QL STRIP: ABNORMAL
LEUKOCYTE ESTERASE UR QL STRIP: NEGATIVE
LIPASE SERPL-CCNC: 87 U/L (ref 4–60)
LYMPHOCYTES # BLD AUTO: 1 K/UL (ref 1–4.8)
LYMPHOCYTES NFR BLD: 17 % (ref 18–48)
MAGNESIUM SERPL-MCNC: 3.1 MG/DL (ref 1.6–2.6)
MCH RBC QN AUTO: 32.7 PG (ref 27–31)
MCHC RBC AUTO-ENTMCNC: 34 G/DL (ref 32–36)
MCV RBC AUTO: 96 FL (ref 82–98)
METHADONE UR QL SCN>300 NG/ML: NEGATIVE
MONOCYTES # BLD AUTO: 0.5 K/UL (ref 0.3–1)
MONOCYTES NFR BLD: 8.4 % (ref 4–15)
NEUTROPHILS # BLD AUTO: 4.3 K/UL (ref 1.8–7.7)
NEUTROPHILS NFR BLD: 72.1 % (ref 38–73)
NITRITE UR QL STRIP: NEGATIVE
NRBC BLD-RTO: 0 /100 WBC
OPIATES UR QL SCN: NEGATIVE
PCO2 BLDA: 16 MMHG (ref 35–45)
PCP UR QL SCN>25 NG/ML: NEGATIVE
PH SMN: 7.31 [PH] (ref 7.35–7.45)
PH UR STRIP: 5 [PH] (ref 5–8)
PHOSPHATE SERPL-MCNC: 3.9 MG/DL (ref 2.7–4.5)
PLATELET # BLD AUTO: 259 K/UL (ref 150–450)
PMV BLD AUTO: 9.3 FL (ref 9.2–12.9)
PO2 BLDA: 106 MMHG (ref 75–100)
POC BE: -16 MMOL/L (ref -2–2)
POC COHB: 1.3 % (ref 0–3)
POC METHB: 1.3 % (ref 0–1.5)
POC O2HB ARTERIAL: 96.1 % (ref 94–100)
POC SATURATED O2: 98.7 % (ref 90–100)
POC TCO2: 8.6 MMOL/L
POC THB: 10.2 G/DL (ref 12–18)
POTASSIUM SERPL-SCNC: 4 MMOL/L (ref 3.5–5.1)
POTASSIUM SERPL-SCNC: 4 MMOL/L (ref 3.5–5.1)
PROT SERPL-MCNC: 7.5 G/DL (ref 6–8.4)
PROT UR QL STRIP: NEGATIVE
RBC # BLD AUTO: 3.46 M/UL (ref 4–5.4)
SARS-COV-2 RDRP RESP QL NAA+PROBE: NEGATIVE
SITE: ABNORMAL
SODIUM SERPL-SCNC: 135 MMOL/L (ref 136–145)
SODIUM SERPL-SCNC: 137 MMOL/L (ref 136–145)
SP GR UR STRIP: 1.01 (ref 1–1.03)
SPECIMEN SOURCE: NORMAL
T4 FREE SERPL-MCNC: 0.76 NG/DL (ref 0.71–1.51)
TOXICOLOGY INFORMATION: ABNORMAL
TROPONIN I SERPL DL<=0.01 NG/ML-MCNC: 0.02 NG/ML (ref 0–0.03)
TSH SERPL DL<=0.005 MIU/L-ACNC: 4.3 UIU/ML (ref 0.4–4)
URN SPEC COLLECT METH UR: ABNORMAL
UROBILINOGEN UR STRIP-ACNC: NEGATIVE EU/DL
WBC # BLD AUTO: 5.93 K/UL (ref 3.9–12.7)

## 2022-05-18 PROCEDURE — 86308 HETEROPHILE ANTIBODY SCREEN: CPT | Performed by: SURGERY

## 2022-05-18 PROCEDURE — 85025 COMPLETE CBC W/AUTO DIFF WBC: CPT | Performed by: SURGERY

## 2022-05-18 PROCEDURE — 84484 ASSAY OF TROPONIN QUANT: CPT | Performed by: SURGERY

## 2022-05-18 PROCEDURE — 36415 COLL VENOUS BLD VENIPUNCTURE: CPT | Performed by: SURGERY

## 2022-05-18 PROCEDURE — G0378 HOSPITAL OBSERVATION PER HR: HCPCS

## 2022-05-18 PROCEDURE — 96375 TX/PRO/DX INJ NEW DRUG ADDON: CPT

## 2022-05-18 PROCEDURE — 84443 ASSAY THYROID STIM HORMONE: CPT | Performed by: SURGERY

## 2022-05-18 PROCEDURE — 93010 EKG 12-LEAD: ICD-10-PCS | Mod: ,,, | Performed by: INTERNAL MEDICINE

## 2022-05-18 PROCEDURE — 84100 ASSAY OF PHOSPHORUS: CPT | Performed by: SURGERY

## 2022-05-18 PROCEDURE — 93005 ELECTROCARDIOGRAM TRACING: CPT

## 2022-05-18 PROCEDURE — 36600 WITHDRAWAL OF ARTERIAL BLOOD: CPT

## 2022-05-18 PROCEDURE — 87502 INFLUENZA DNA AMP PROBE: CPT | Performed by: SURGERY

## 2022-05-18 PROCEDURE — 99285 EMERGENCY DEPT VISIT HI MDM: CPT | Mod: 25

## 2022-05-18 PROCEDURE — U0002 COVID-19 LAB TEST NON-CDC: HCPCS | Performed by: SURGERY

## 2022-05-18 PROCEDURE — 84439 ASSAY OF FREE THYROXINE: CPT | Performed by: SURGERY

## 2022-05-18 PROCEDURE — 81003 URINALYSIS AUTO W/O SCOPE: CPT | Mod: 59 | Performed by: SURGERY

## 2022-05-18 PROCEDURE — 80048 BASIC METABOLIC PNL TOTAL CA: CPT | Mod: XB | Performed by: SURGERY

## 2022-05-18 PROCEDURE — 82553 CREATINE MB FRACTION: CPT | Performed by: SURGERY

## 2022-05-18 PROCEDURE — 81025 URINE PREGNANCY TEST: CPT | Performed by: SURGERY

## 2022-05-18 PROCEDURE — 83735 ASSAY OF MAGNESIUM: CPT | Performed by: SURGERY

## 2022-05-18 PROCEDURE — 96361 HYDRATE IV INFUSION ADD-ON: CPT

## 2022-05-18 PROCEDURE — 93010 ELECTROCARDIOGRAM REPORT: CPT | Mod: ,,, | Performed by: INTERNAL MEDICINE

## 2022-05-18 PROCEDURE — 80307 DRUG TEST PRSMV CHEM ANLYZR: CPT | Performed by: SURGERY

## 2022-05-18 PROCEDURE — 87651 STREP A DNA AMP PROBE: CPT | Performed by: SURGERY

## 2022-05-18 PROCEDURE — 83880 ASSAY OF NATRIURETIC PEPTIDE: CPT | Performed by: SURGERY

## 2022-05-18 PROCEDURE — 96366 THER/PROPH/DIAG IV INF ADDON: CPT

## 2022-05-18 PROCEDURE — 82803 BLOOD GASES ANY COMBINATION: CPT | Performed by: SURGERY

## 2022-05-18 PROCEDURE — 96365 THER/PROPH/DIAG IV INF INIT: CPT

## 2022-05-18 PROCEDURE — 25000003 PHARM REV CODE 250: Performed by: SURGERY

## 2022-05-18 PROCEDURE — 80053 COMPREHEN METABOLIC PANEL: CPT | Performed by: SURGERY

## 2022-05-18 PROCEDURE — 96376 TX/PRO/DX INJ SAME DRUG ADON: CPT

## 2022-05-18 PROCEDURE — 63600175 PHARM REV CODE 636 W HCPCS: Performed by: SURGERY

## 2022-05-18 PROCEDURE — 99900035 HC TECH TIME PER 15 MIN (STAT)

## 2022-05-18 PROCEDURE — 83690 ASSAY OF LIPASE: CPT | Performed by: SURGERY

## 2022-05-18 RX ORDER — SODIUM BICARBONATE 1 MEQ/ML
SYRINGE (ML) INTRAVENOUS
Status: DISPENSED
Start: 2022-05-18 | End: 2022-05-19

## 2022-05-18 RX ORDER — SODIUM CHLORIDE 9 MG/ML
1000 INJECTION, SOLUTION INTRAVENOUS CONTINUOUS
Status: DISCONTINUED | OUTPATIENT
Start: 2022-05-18 | End: 2022-05-18

## 2022-05-18 RX ORDER — ONDANSETRON 2 MG/ML
4 INJECTION INTRAMUSCULAR; INTRAVENOUS
Status: COMPLETED | OUTPATIENT
Start: 2022-05-18 | End: 2022-05-18

## 2022-05-18 RX ORDER — SODIUM BICARBONATE 1 MEQ/ML
50 SYRINGE (ML) INTRAVENOUS
Status: COMPLETED | OUTPATIENT
Start: 2022-05-18 | End: 2022-05-18

## 2022-05-18 RX ORDER — LORAZEPAM 2 MG/ML
1 INJECTION INTRAMUSCULAR
Status: COMPLETED | OUTPATIENT
Start: 2022-05-18 | End: 2022-05-18

## 2022-05-18 RX ADMIN — SODIUM CHLORIDE 1000 ML: 0.9 INJECTION, SOLUTION INTRAVENOUS at 02:05

## 2022-05-18 RX ADMIN — SODIUM CHLORIDE 1000 ML: 0.9 INJECTION, SOLUTION INTRAVENOUS at 03:05

## 2022-05-18 RX ADMIN — SODIUM BICARBONATE 50 MEQ: 84 INJECTION, SOLUTION INTRAVENOUS at 06:05

## 2022-05-18 RX ADMIN — SODIUM CHLORIDE 1000 ML: 0.9 INJECTION, SOLUTION INTRAVENOUS at 04:05

## 2022-05-18 RX ADMIN — LORAZEPAM 1 MG: 2 INJECTION INTRAMUSCULAR; INTRAVENOUS at 05:05

## 2022-05-18 RX ADMIN — SODIUM BICARBONATE: 84 INJECTION, SOLUTION INTRAVENOUS at 09:05

## 2022-05-18 RX ADMIN — ONDANSETRON HYDROCHLORIDE 4 MG: 2 SOLUTION INTRAMUSCULAR; INTRAVENOUS at 03:05

## 2022-05-18 RX ADMIN — SODIUM CHLORIDE 1000 ML: 0.9 INJECTION, SOLUTION INTRAVENOUS at 06:05

## 2022-05-18 NOTE — ED TRIAGE NOTES
"32 y.o. female presents to ER Room/bed info not found   Chief Complaint   Patient presents with    Leg Swelling   .   C/o leg swelling and pain "for a while"  "
normal...

## 2022-05-19 PROBLEM — E87.6 HYPOKALEMIA: Status: ACTIVE | Noted: 2022-05-19

## 2022-05-19 PROBLEM — E86.0 DEHYDRATION: Status: ACTIVE | Noted: 2022-05-19

## 2022-05-19 PROBLEM — F41.1 GAD (GENERALIZED ANXIETY DISORDER): Status: ACTIVE | Noted: 2022-05-19

## 2022-05-19 PROBLEM — I10 HTN (HYPERTENSION): Status: ACTIVE | Noted: 2022-05-19

## 2022-05-19 PROBLEM — N18.4 CKD (CHRONIC KIDNEY DISEASE) STAGE 4, GFR 15-29 ML/MIN: Status: ACTIVE | Noted: 2022-05-19

## 2022-05-19 PROBLEM — F32.9 MAJOR DEPRESSION: Status: ACTIVE | Noted: 2022-05-19

## 2022-05-19 LAB
ALBUMIN SERPL BCP-MCNC: 2.4 G/DL (ref 3.5–5.2)
ALP SERPL-CCNC: 56 U/L (ref 55–135)
ALT SERPL W/O P-5'-P-CCNC: 15 U/L (ref 10–44)
ANION GAP SERPL CALC-SCNC: 13 MMOL/L (ref 8–16)
AST SERPL-CCNC: 37 U/L (ref 10–40)
BASOPHILS # BLD AUTO: 0.01 K/UL (ref 0–0.2)
BASOPHILS NFR BLD: 0.1 % (ref 0–1.9)
BILIRUB SERPL-MCNC: 0.2 MG/DL (ref 0.1–1)
BUN SERPL-MCNC: 37 MG/DL (ref 6–20)
CALCIUM SERPL-MCNC: 7.7 MG/DL (ref 8.7–10.5)
CHLORIDE SERPL-SCNC: 106 MMOL/L (ref 95–110)
CK SERPL-CCNC: 535 U/L (ref 20–180)
CO2 SERPL-SCNC: 20 MMOL/L (ref 23–29)
CREAT SERPL-MCNC: 2.8 MG/DL (ref 0.5–1.4)
DIFFERENTIAL METHOD: ABNORMAL
EOSINOPHIL # BLD AUTO: 0.1 K/UL (ref 0–0.5)
EOSINOPHIL NFR BLD: 0.7 % (ref 0–8)
ERYTHROCYTE [DISTWIDTH] IN BLOOD BY AUTOMATED COUNT: 14.5 % (ref 11.5–14.5)
ERYTHROCYTE [SEDIMENTATION RATE] IN BLOOD BY WESTERGREN METHOD: 60 MM/HR (ref 0–20)
EST. GFR  (AFRICAN AMERICAN): 25 ML/MIN/1.73 M^2
EST. GFR  (NON AFRICAN AMERICAN): 22 ML/MIN/1.73 M^2
GLUCOSE SERPL-MCNC: 103 MG/DL (ref 70–110)
HCT VFR BLD AUTO: 26.7 % (ref 37–48.5)
HGB BLD-MCNC: 9 G/DL (ref 12–16)
IMM GRANULOCYTES # BLD AUTO: 0.03 K/UL (ref 0–0.04)
IMM GRANULOCYTES NFR BLD AUTO: 0.4 % (ref 0–0.5)
LYMPHOCYTES # BLD AUTO: 1.2 K/UL (ref 1–4.8)
LYMPHOCYTES NFR BLD: 16.3 % (ref 18–48)
MCH RBC QN AUTO: 32.1 PG (ref 27–31)
MCHC RBC AUTO-ENTMCNC: 33.7 G/DL (ref 32–36)
MCV RBC AUTO: 95 FL (ref 82–98)
MONOCYTES # BLD AUTO: 0.6 K/UL (ref 0.3–1)
MONOCYTES NFR BLD: 7.9 % (ref 4–15)
NEUTROPHILS # BLD AUTO: 5.5 K/UL (ref 1.8–7.7)
NEUTROPHILS NFR BLD: 74.6 % (ref 38–73)
NRBC BLD-RTO: 0 /100 WBC
PLATELET # BLD AUTO: 207 K/UL (ref 150–450)
PMV BLD AUTO: 9.5 FL (ref 9.2–12.9)
POTASSIUM SERPL-SCNC: 3 MMOL/L (ref 3.5–5.1)
PROT SERPL-MCNC: 5.2 G/DL (ref 6–8.4)
RBC # BLD AUTO: 2.8 M/UL (ref 4–5.4)
SODIUM SERPL-SCNC: 139 MMOL/L (ref 136–145)
WBC # BLD AUTO: 7.36 K/UL (ref 3.9–12.7)

## 2022-05-19 PROCEDURE — 82550 ASSAY OF CK (CPK): CPT | Performed by: PSYCHIATRY & NEUROLOGY

## 2022-05-19 PROCEDURE — 96366 THER/PROPH/DIAG IV INF ADDON: CPT

## 2022-05-19 PROCEDURE — 99222 PR INITIAL HOSPITAL CARE,LEVL II: ICD-10-PCS | Mod: ,,, | Performed by: PSYCHIATRY & NEUROLOGY

## 2022-05-19 PROCEDURE — 99222 PR INITIAL HOSPITAL CARE,LEVL II: ICD-10-PCS | Mod: ,,, | Performed by: FAMILY MEDICINE

## 2022-05-19 PROCEDURE — 80053 COMPREHEN METABOLIC PANEL: CPT | Performed by: SURGERY

## 2022-05-19 PROCEDURE — 25000003 PHARM REV CODE 250: Performed by: SURGERY

## 2022-05-19 PROCEDURE — 99222 1ST HOSP IP/OBS MODERATE 55: CPT | Mod: ,,, | Performed by: FAMILY MEDICINE

## 2022-05-19 PROCEDURE — 36415 COLL VENOUS BLD VENIPUNCTURE: CPT | Performed by: SURGERY

## 2022-05-19 PROCEDURE — 85025 COMPLETE CBC W/AUTO DIFF WBC: CPT | Performed by: SURGERY

## 2022-05-19 PROCEDURE — G0378 HOSPITAL OBSERVATION PER HR: HCPCS

## 2022-05-19 PROCEDURE — 96361 HYDRATE IV INFUSION ADD-ON: CPT

## 2022-05-19 PROCEDURE — 99222 1ST HOSP IP/OBS MODERATE 55: CPT | Mod: ,,, | Performed by: PSYCHIATRY & NEUROLOGY

## 2022-05-19 PROCEDURE — 83519 RIA NONANTIBODY: CPT | Mod: 59 | Performed by: PSYCHIATRY & NEUROLOGY

## 2022-05-19 PROCEDURE — 86140 C-REACTIVE PROTEIN: CPT | Performed by: PSYCHIATRY & NEUROLOGY

## 2022-05-19 PROCEDURE — 63600175 PHARM REV CODE 636 W HCPCS: Performed by: SURGERY

## 2022-05-19 PROCEDURE — 11000001 HC ACUTE MED/SURG PRIVATE ROOM

## 2022-05-19 PROCEDURE — 25000003 PHARM REV CODE 250: Performed by: PSYCHIATRY & NEUROLOGY

## 2022-05-19 PROCEDURE — 25000003 PHARM REV CODE 250: Performed by: NURSE PRACTITIONER

## 2022-05-19 PROCEDURE — 85651 RBC SED RATE NONAUTOMATED: CPT | Performed by: PSYCHIATRY & NEUROLOGY

## 2022-05-19 PROCEDURE — 83519 RIA NONANTIBODY: CPT | Performed by: PSYCHIATRY & NEUROLOGY

## 2022-05-19 RX ORDER — POTASSIUM CHLORIDE 20 MEQ/1
40 TABLET, EXTENDED RELEASE ORAL ONCE
Status: COMPLETED | OUTPATIENT
Start: 2022-05-19 | End: 2022-05-19

## 2022-05-19 RX ORDER — SODIUM CHLORIDE 0.9 % (FLUSH) 0.9 %
10 SYRINGE (ML) INJECTION
Status: DISCONTINUED | OUTPATIENT
Start: 2022-05-19 | End: 2022-05-20 | Stop reason: HOSPADM

## 2022-05-19 RX ORDER — SERTRALINE HYDROCHLORIDE 50 MG/1
50 TABLET, FILM COATED ORAL DAILY
Status: DISCONTINUED | OUTPATIENT
Start: 2022-05-19 | End: 2022-05-20 | Stop reason: HOSPADM

## 2022-05-19 RX ORDER — HYDROCODONE BITARTRATE AND ACETAMINOPHEN 10; 325 MG/1; MG/1
1 TABLET ORAL
Status: COMPLETED | OUTPATIENT
Start: 2022-05-19 | End: 2022-05-19

## 2022-05-19 RX ORDER — CYCLOBENZAPRINE HCL 5 MG
5 TABLET ORAL NIGHTLY
Status: DISCONTINUED | OUTPATIENT
Start: 2022-05-19 | End: 2022-05-20

## 2022-05-19 RX ORDER — FAMOTIDINE 20 MG/1
40 TABLET, FILM COATED ORAL DAILY
Status: DISCONTINUED | OUTPATIENT
Start: 2022-05-19 | End: 2022-05-19

## 2022-05-19 RX ORDER — ACETAMINOPHEN 325 MG/1
650 TABLET ORAL EVERY 8 HOURS PRN
Status: DISCONTINUED | OUTPATIENT
Start: 2022-05-19 | End: 2022-05-20 | Stop reason: HOSPADM

## 2022-05-19 RX ORDER — SODIUM BICARBONATE 1 MEQ/ML
SYRINGE (ML) INTRAVENOUS
Status: DISCONTINUED
Start: 2022-05-19 | End: 2022-05-20 | Stop reason: HOSPADM

## 2022-05-19 RX ORDER — ONDANSETRON 2 MG/ML
4 INJECTION INTRAMUSCULAR; INTRAVENOUS EVERY 8 HOURS PRN
Status: DISCONTINUED | OUTPATIENT
Start: 2022-05-19 | End: 2022-05-20 | Stop reason: HOSPADM

## 2022-05-19 RX ORDER — FAMOTIDINE 20 MG/1
20 TABLET, FILM COATED ORAL DAILY
Status: DISCONTINUED | OUTPATIENT
Start: 2022-05-19 | End: 2022-05-20 | Stop reason: HOSPADM

## 2022-05-19 RX ORDER — CLONAZEPAM 1 MG/1
1 TABLET ORAL DAILY PRN
Status: DISCONTINUED | OUTPATIENT
Start: 2022-05-19 | End: 2022-05-20 | Stop reason: HOSPADM

## 2022-05-19 RX ORDER — SODIUM BICARBONATE 1 MEQ/ML
SYRINGE (ML) INTRAVENOUS
Status: DISPENSED
Start: 2022-05-19 | End: 2022-05-19

## 2022-05-19 RX ORDER — TALC
6 POWDER (GRAM) TOPICAL NIGHTLY PRN
Status: DISCONTINUED | OUTPATIENT
Start: 2022-05-19 | End: 2022-05-20 | Stop reason: HOSPADM

## 2022-05-19 RX ORDER — TRAZODONE HYDROCHLORIDE 50 MG/1
50 TABLET ORAL NIGHTLY PRN
Status: DISCONTINUED | OUTPATIENT
Start: 2022-05-19 | End: 2022-05-20 | Stop reason: HOSPADM

## 2022-05-19 RX ORDER — BENZONATATE 100 MG/1
100 CAPSULE ORAL 3 TIMES DAILY PRN
Status: DISCONTINUED | OUTPATIENT
Start: 2022-05-19 | End: 2022-05-20 | Stop reason: HOSPADM

## 2022-05-19 RX ORDER — CARVEDILOL 3.12 MG/1
6.25 TABLET ORAL 2 TIMES DAILY
Status: DISCONTINUED | OUTPATIENT
Start: 2022-05-19 | End: 2022-05-20 | Stop reason: HOSPADM

## 2022-05-19 RX ADMIN — POTASSIUM CHLORIDE 40 MEQ: 1500 TABLET, EXTENDED RELEASE ORAL at 04:05

## 2022-05-19 RX ADMIN — HYDROCODONE BITARTRATE AND ACETAMINOPHEN 1 TABLET: 10; 325 TABLET ORAL at 12:05

## 2022-05-19 RX ADMIN — BENZONATATE 100 MG: 100 CAPSULE ORAL at 12:05

## 2022-05-19 RX ADMIN — POTASSIUM CHLORIDE 40 MEQ: 1500 TABLET, EXTENDED RELEASE ORAL at 03:05

## 2022-05-19 RX ADMIN — CLONAZEPAM 1 MG: 1 TABLET ORAL at 06:05

## 2022-05-19 RX ADMIN — SERTRALINE HYDROCHLORIDE 50 MG: 50 TABLET ORAL at 09:05

## 2022-05-19 RX ADMIN — CYCLOBENZAPRINE HYDROCHLORIDE 5 MG: 5 TABLET, FILM COATED ORAL at 06:05

## 2022-05-19 RX ADMIN — FAMOTIDINE 20 MG: 20 TABLET ORAL at 09:05

## 2022-05-19 RX ADMIN — CARVEDILOL 6.25 MG: 3.12 TABLET, FILM COATED ORAL at 08:05

## 2022-05-19 RX ADMIN — SODIUM BICARBONATE: 84 INJECTION, SOLUTION INTRAVENOUS at 04:05

## 2022-05-19 RX ADMIN — TRAZODONE HYDROCHLORIDE 50 MG: 50 TABLET ORAL at 08:05

## 2022-05-19 RX ADMIN — ONDANSETRON HYDROCHLORIDE 4 MG: 2 SOLUTION INTRAMUSCULAR; INTRAVENOUS at 10:05

## 2022-05-19 NOTE — ASSESSMENT & PLAN NOTE
bp 117//83  Hold bp meds for now (losartan and amlodipine - ok for coreg as hr elevated 107)  Sees Dr. Olivas who has her on these meds.

## 2022-05-19 NOTE — ASSESSMENT & PLAN NOTE
Debility has been progressive- legs feels heavy and swollen. No pitting edema. CK marginally elevated  Consult neuro   She seems to be able to ambulate at baseline per her report, but weakness has occurred over the recent weeks.
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
## 2022-05-19 NOTE — ED NOTES
Pt sleeping comfortably on ED stretcher. NADN. AAOx3. Respirations even and unlabored. Bed locked in lowest position. Call bell within reach. Grandma @ bedside. Infusions without difficulty. Awaiting MD orders.

## 2022-05-19 NOTE — PLAN OF CARE
05/19/22 0748   ED Admissions Case Approval   ED Admissions Case Approval CM Approved       Chart review complete. Admission criteria met at INPATIENT level of care. Message will be sent to MD to ask for inpatient order.     5/19/2022: after rounds with MD. It is known that patient has CKD IV and would only meet observation at this time.

## 2022-05-19 NOTE — ASSESSMENT & PLAN NOTE
Pt reports that she was last seen by dr Forbes and told she had 16% renal function. ON admission GFR was 13. Given 5L NS and repeat GFR now 22. Stop fluids. Called for records -- last note from dr Olivas

## 2022-05-19 NOTE — ED NOTES
Report taken from CHARITY Glaser. Pt resting comfortably on ED stretcher. NADN. AAOx3. Respirations even and unlabored. Bed locked in lowest position. Call bell within reach. Grandma @ bedside. Awaiting MD orders.

## 2022-05-19 NOTE — ED PROVIDER NOTES
Encounter Date: 5/18/2022       History     Chief Complaint   Patient presents with    Leg Swelling     Francis Bains is a 32 y.o. female presents with body aches today  Patient felt fatigue with leg swelling, body aches for several months now  Patient has been to the [place] ER several times, DX of anxiety there  She has chronic nausea and abdominal pain, sees Dr. Dougherty in [place]  Went to her primary care nurse practitioner George and was sent to ER  George had not seen the patient several months, thought she was pale        Review of patient's allergies indicates:   Allergen Reactions    Ace inhibitors Other (See Comments)    Nabumetone Other (See Comments)    Nsaids (non-steroidal anti-inflammatory drug) Other (See Comments)    Sulfa (sulfonamide antibiotics) Hives    Pcn  [penicillins] Rash     Past Medical History:   Diagnosis Date    Cerebral palsy     Hearing impaired person     Hypertension     Osteopenia determined by x-ray     Oswaldo syndrome      Past Surgical History:   Procedure Laterality Date    CHOLECYSTECTOMY      INNER EAR SURGERY      RENAL BIOPSY  07/07/14     Family History   Problem Relation Age of Onset    Breast cancer Neg Hx     Colon cancer Neg Hx     Ovarian cancer Neg Hx      Social History     Tobacco Use    Smoking status: Current Some Day Smoker     Packs/day: 0.25     Years: 9.00     Pack years: 2.25    Smokeless tobacco: Never Used   Substance Use Topics    Alcohol use: Yes     Comment: Social    Drug use: Yes     Types: Marijuana     Review of Systems   Constitutional: Positive for fatigue.   HENT: Negative.    Eyes: Negative.    Respiratory: Negative.    Cardiovascular: Negative.    Gastrointestinal: Negative.    Genitourinary: Negative.    Musculoskeletal: Negative.    Skin: Negative.    Neurological: Positive for weakness.   Psychiatric/Behavioral: Negative.      Physical Exam     Initial Vitals [05/18/22 1345]   BP Pulse Resp Temp SpO2   117/75 96  20 97 °F (36.1 °C) 99 %      MAP       --         Physical Exam    Constitutional: Vital signs are normal. She appears well-developed and well-nourished. She is cooperative.   HENT:   Head: Normocephalic and atraumatic.   Right Ear: External ear normal.   Left Ear: External ear normal.   Nose: Nose normal.   Mouth/Throat: Oropharynx is clear and moist.   Eyes: Conjunctivae, EOM and lids are normal. Pupils are equal, round, and reactive to light.   Neck: Trachea normal and phonation normal. Neck supple. No JVD present.   Normal range of motion.   Full passive range of motion without pain.     Cardiovascular: Normal rate, regular rhythm, S1 normal, S2 normal, normal heart sounds, intact distal pulses and normal pulses.   Pulmonary/Chest: Effort normal and breath sounds normal.   Abdominal: Abdomen is soft and flat. Bowel sounds are normal.   Musculoskeletal:         General: Normal range of motion.      Cervical back: Full passive range of motion without pain, normal range of motion and neck supple.     Neurological: She is alert and oriented to person, place, and time. She has normal strength.   Skin: Skin is warm, dry and intact. Capillary refill takes less than 2 seconds.         ED Course   Procedures  Labs Reviewed   COMPREHENSIVE METABOLIC PANEL - Abnormal; Notable for the following components:       Result Value    Sodium 135 (*)     CO2 13 (*)     BUN 52 (*)     Creatinine 4.2 (*)     Anion Gap 17 (*)     eGFR if  15 (*)     eGFR if non  13 (*)     All other components within normal limits   CBC W/ AUTO DIFFERENTIAL - Abnormal; Notable for the following components:    RBC 3.46 (*)     Hemoglobin 11.3 (*)     Hematocrit 33.2 (*)     MCH 32.7 (*)     RDW 14.7 (*)     Lymph % 17.0 (*)     All other components within normal limits   LIPASE - Abnormal; Notable for the following components:    Lipase 87 (*)     All other components within normal limits   URINALYSIS, REFLEX TO URINE  CULTURE - Abnormal; Notable for the following components:    Ketones, UA Trace (*)     Bilirubin (UA) 2+ (*)     All other components within normal limits    Narrative:     Specimen Source->Urine   DRUG SCREEN PANEL, URINE EMERGENCY - Abnormal; Notable for the following components:    Benzodiazepines Presumptive Positive (*)     THC Presumptive Positive (*)     All other components within normal limits    Narrative:     Specimen Source->Urine   CK-MB - Abnormal; Notable for the following components:     (*)     All other components within normal limits   CK - Abnormal; Notable for the following components:     (*)     All other components within normal limits   TSH - Abnormal; Notable for the following components:    TSH 4.301 (*)     All other components within normal limits   MAGNESIUM - Abnormal; Notable for the following components:    Magnesium 3.1 (*)     All other components within normal limits   BASIC METABOLIC PANEL - Abnormal; Notable for the following components:    Chloride 111 (*)     CO2 8 (*)     BUN 44 (*)     Creatinine 3.4 (*)     Calcium 7.9 (*)     Anion Gap 18 (*)     eGFR if  20 (*)     eGFR if non  17 (*)     All other components within normal limits    Narrative:     CO2 critical result(s) called and verbal readback obtained from    Aicha Self RN by AK5 05/18/2022 18:22   INFLUENZA A & B BY MOLECULAR   GROUP A STREP, MOLECULAR   PREGNANCY TEST, URINE RAPID    Narrative:     Specimen Source->Urine   TROPONIN I   B-TYPE NATRIURETIC PEPTIDE   PHOSPHORUS   SARS-COV-2 RNA AMPLIFICATION, QUAL   HETEROPHILE AB SCREEN   T4, FREE     EKG Readings: (Independently Interpreted)   Initial Reading: No STEMI. Rhythm: Normal Sinus Rhythm. Heart Rate: 80s. Ectopy: No Ectopy. Conduction: Normal. ST Segments: Normal ST Segments. T Waves: Normal. Axis: Normal.     ECG Results          EKG 12-lead (Final result)  Result time 05/18/22 18:02:36    Final result by  Interface, Lab In [facility] (05/18/22 18:02:36)                 Narrative:    Test Reason : R53.83    Vent. Rate : 079 BPM     Atrial Rate : 079 BPM     P-R Int : 120 ms          QRS Dur : 086 ms      QT Int : 388 ms       P-R-T Axes : 060 069 062 degrees     QTc Int : 444 ms    Baseline Artifact  Normal sinus rhythm  Normal ECG  No previous ECGs available  Confirmed by Cedrick Fink MD ([id number]) on 5/18/2022 6:02:26 PM    Referred By: HIRAM   SELF           Confirmed By:Cedrick Fink MD                            Imaging Results          X-Ray Abdomen Flat And Erect (Final result)  Result time 05/18/22 15:57:52    Final result by Nathaniel Bryan MD (05/18/22 15:57:52)                 Impression:      Nonspecific bowel gas pattern without evidence of focal obstruction.      Electronically signed by: Nathaniel Bryan MD  Date:    05/18/2022  Time:    15:57             Narrative:    EXAMINATION:  XR ABDOMEN FLAT AND ERECT    CLINICAL HISTORY:  Nausea and vomitting (787.01);.    TECHNIQUE:  Supine and upright views of the abdomen dated May 18, 2022.    COMPARISON:  No prior study for comparison.    FINDINGS:  No evidence of focal bowel obstruction.  Moderate stool.  No evidence of pathologic calcifications or soft tissue masses.                                 Medications   0.9%  NaCl infusion (1,000 mLs Intravenous New Bag 5/18/22 1831)   sodium bicarbonate 150 mEq in dextrose 5 % 1,000 mL infusion (has no administration in time range)   sodium bicarbonate 8.4 % (1 mEq/mL) injection (has no administration in time range)   sodium chloride 0.9% bolus 1,000 mL (0 mLs Intravenous Stopped 5/18/22 1450)   sodium chloride 0.9% bolus 1,000 mL (0 mLs Intravenous Stopped 5/18/22 1544)   sodium chloride 0.9% bolus 1,000 mL (0 mLs Intravenous Stopped 5/18/22 1621)   sodium chloride 0.9% bolus 1,000 mL (0 mLs Intravenous Stopped 5/18/22 1725)   ondansetron injection 4 mg (4 mg Intravenous Given 5/18/22 1520)   sodium chloride 0.9%  bolus 1,000 mL (0 mLs Intravenous Stopped 5/18/22 1645)   lorazepam injection 1 mg (1 mg Intravenous Given 5/18/22 1741)   sodium bicarbonate 8.4 % (1 mEq/mL) injection 50 mEq (50 mEq Intravenous Given 5/18/22 1841)     Critical Care ED Physician Time (minutes):  -- Performed by: Guerrero Urban M.D.  -- Date/Time: 12:58 PM 6/4/2022   -- Direct Patient Care (Face Time): 5  -- Additional History from Records or Taking Additional History: 5  -- Ordering, Reviewing, and Interpreting Diagnostic Studies: 5  -- Total Time in Documentation: 11  -- Consultation with Other Physicians: 5  -- Consultation with Family Related to Condition: 0  -- Total Critical Care Time: 31  -- Critical care was necessary to treat acute kidney   -- Critical care was time spent personally by me on the following activities:   -- blood draw for specimens discussions with consultants,   -- development of treatment plan with patient or surrogate,   -- examination of patient, ordering and performing treatments   -- review of radiographic studies, re-evaluation of pt's condition  -- review of labs and evaluation of response to treatment     Medical Decision Making:   Initial Assessment:   Francis Bains is a 32 y.o. female presents with body aches and pain today  Patient has been to several emergency rooms with a diagnosis of anxiety  Patient has had poor appetite and poor oral intake, severe depression issues  Lives with grandmother who does not leave her side, severe code dependence  Patient is not orthostatic or febrile, NP sent the patient for evaluation today    Differential Diagnosis:   Fatigue, weakness, fibromyalgia, anxiety, dehydration, UTI, organ dysfunction    Clinical Tests:   Lab Tests: Ordered  Radiological Study: Ordered and Reviewed  Medical Tests: Ordered and Reviewed    ED Management:  Patient with a creatinine over for with no previous history of organ dysfunction  This could be as simple as dehydration, this is a very complex  situation  This patient has significant issues with anxiety and depression and CP  Grandmother states she is not eating or drinking, chronic pain syndrome  Patient's creatinine improved with IV fluids but acidosis worsened today  Patient now being given IV fluids with bicarb for hydration/observation  Morning lab work, hydration, correction of creatinine and acidosis tonight                      Clinical Impression:   Final diagnoses:  [R53.83] Fatigue  [N17.9] JESSICA (acute kidney injury) (Primary)          ED Disposition Condition    Observation                    Guerrero Urban MD  06/04/22 0681

## 2022-05-19 NOTE — ASSESSMENT & PLAN NOTE
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 The cause of her variable allodynia is not known to me    -She reports increased difficulty with her mobility for the past several months. Today she has tingling hands in light of dehydration    -Exam is difficult as there is baseline neurological dysfunction but also new complaints of decreased mobility on superimposed axis II disorder    -Will update her neuroimaging with CT head and C spine given her complaints of difficulty walking, tingling hands, and diffuse pain. She can't have an MRI due to cochlear implants. CT imaging will also be limited, however    -Labs: repeat CK, check MG panel, ESR, CRP    -If the above is unremarkable, she will need outpatient follow up with me to continue work-up. Suspect a genetic component to her symptoms. She has CP, but also pure ovarian dysgenesis, congenital deafness (which runs in her family along with unspecified joint problems/ the patient has osteoporosis). Her mom has been recently diagnosis with a hypermobility syndrome. The patient will also benefit from updated genetics consult outpatient. Remote testing revealed female karyotype during limited analysis    -Flexeril trial for muscle tightness/ spasticity. Baclofen contraindicated given her CKD    -PT consult while here and would likely benefit from home health at d/c. Likely would benefit from a walker/ await PT's recs    -She lives with her grandmother, and I did review the plan of care with her dad per patient's request. She is followed by outside psychiatry for Mood and [place] II disorder but dad believes she is not always compliant with her medications    Will see patient back Monday if not d/c'ed prior. Otherwise, please arrange follow up with me per D/c summary

## 2022-05-19 NOTE — ED NOTES
Pt sleeping comfortably on ED stretcher. NADN. AAOx3. Respirations even and unlabored. Bed locked in lowest position. Call bell within reach. Grandma @ bedside. Infusions infusing without difficulty. Awaiting admit.

## 2022-05-19 NOTE — NURSING
Patient admitted to Med Surg for dehydration. Patient accompanied by her grandmother who was able to answer questions. This nurse noticed an alert fir an ...this nurse made certain to ask patient as well as grandmother if she needed one for sign language-both replied no at that time. Patient has a cochlear implant to right ear, patient has device at her bedside. Patient has a huffman in place, she voids yellow, clear without odor. Physician came by to visit with her while grandmother present. This nurse certain to carry orders as they arise. Patient alert/oriented to self, place, time-not so much situation. Brief history include: cerebral palsy, hearing impaired, HTN, Oswaldo syndrome, just to name a few. her diet include low Na 2G.20G IV to left forearm. No skin issues, patient not able to ambulate, her grandmother stated she has not walked in a month. This nurse asked if she could stand both replied no. Staff aware of her [place] at times, needing total assistance with some ADL's. Patient oriented to room, call bell etc. Personal items within reach along with safety measures met. Thank you

## 2022-05-19 NOTE — SUBJECTIVE & OBJECTIVE
Past Medical History:   Diagnosis Date    Cerebral palsy     Hearing impaired person     Hypertension     Osteopenia determined by x-ray     Oswaldo syndrome        Past Surgical History:   Procedure Laterality Date    CHOLECYSTECTOMY      INNER EAR SURGERY      RENAL BIOPSY  07/07/14       Review of patient's allergies indicates:   Allergen Reactions    Ace inhibitors Other (See Comments)    Nabumetone Other (See Comments)    Nsaids (non-steroidal anti-inflammatory drug) Other (See Comments)    Sulfa (sulfonamide antibiotics) Hives    Pcn  [penicillins] Rash   I have reviewed all of this patient's past medical and surgical histories as well as family and social histories and active allergies and medications as documented in the electronic medical record.     No current facility-administered medications on file prior to encounter.     Current Outpatient Medications on File Prior to Encounter   Medication Sig    amlodipine (NORVASC) 10 MG tablet Take 10 mg by mouth once daily.    carvedilol (COREG) 6.25 MG tablet 12.5 mg.     estrogen, conjugated,-medroxyprogesterone 0.625-2.5mg (PREMPRO) 0.625-2.5 mg per tablet Take 1 tablet by mouth once daily.    famotidine (PEPCID) 20 MG tablet TAKE 1 TABLET PO TWICE A DAY    losartan (COZAAR) 50 MG tablet losartan 50 mg tablet    pantoprazole (PROTONIX) 40 MG tablet TK 1 T PO  D    sertraline (ZOLOFT) 100 MG tablet     traZODone (DESYREL) 50 MG tablet     alendronate (FOSAMAX) 70 MG tablet Take 1 tablet (70 mg total) by mouth every 7 days.    clonazePAM (KLONOPIN) 1 MG tablet Take 1 tablet (1 mg total) by mouth daily as needed for Anxiety.    ergocalciferol (ERGOCALCIFEROL) 50,000 unit Cap Vitamin D2 50,000 unit capsule     Family History    None       Tobacco Use    Smoking status: Current Some Day Smoker     Packs/day: 0.25     Years: 9.00     Pack years: 2.25    Smokeless tobacco: Never Used   Substance and Sexual Activity    Alcohol use: Yes     Comment: Social    Drug use:  Yes     Types: Marijuana    Sexual activity: Yes     Partners: Male     Birth control/protection: Inserts     Comment: Single.     Review of Systems  Objective:     Vital Signs (Most Recent):  Temp: 97.3 °F (36.3 °C) (05/19/22 1653)  Pulse: 106 (05/19/22 1653)  Resp: 20 (05/19/22 1653)  BP: (Abnormal) 139/100 (05/19/22 1653)  SpO2: 98 % (05/19/22 1653)   Vital Signs (24h Range):  Temp:  [97.3 °F (36.3 °C)-97.5 °F (36.4 °C)] 97.3 °F (36.3 °C)  Pulse:  [] 106  Resp:  [16-21] 20  SpO2:  [98 %-100 %] 98 %  BP: (117-148)/() 139/100     Weight: 95.7 kg (211 lb)  Body mass index is 37.38 kg/m².    Physical Exam         Gen Appearance, well developed/nourished in no apparent distress  CV: 2+ distal pulses with no edema or swelling  Neuro:  MS: Awake, alert, oriented to place, person, time, situation. Sustains attention. Recent/remote memory intact, Language is full to spontaneous speech/repetition/naming/comprehension. Fund of Knowledge is full  She has dysarthria related to cerebral palsy at baseline  CN: Optic discs are flat with normal vasculature, PERRL, Extraoccular movements and visual fields are full. Normal facial sensation and strength, Hearing symmetric but reduced Tongue and Palate are midline and strong. Shoulder Shrug symmetric and strong.  Motor: Normal bulk, tone is increased in the legs with at least mild contractures there, no abnormal movements. 5/5 strength bilateral upper/lower extremities with 1+ reflexes and + left babinski sign (states left leg has felt weaker her entire life)  Sensory: symmetric to light touch, pain, temp, and vibration but patient screams at times with touched. Romberg  not done  Cerebellar: Finger-nose,Heal-shin, Rapid alternating movements intact  Gait: Not done for safety reasons    Significant Labs: CK 300s at admission  Cr improved (initially 4.2 and now 2.8)  UDS: Benzos and THC  TSH mildly elevated but T4 normal        Significant Imaging: no CT head found

## 2022-05-19 NOTE — NURSING
Grandmother, Mrs. Earl called and stated she would not be able to come back tonight to see her. She stated to tell her she loves her and she will be by tomorrow.

## 2022-05-19 NOTE — PLAN OF CARE
Problem: Infection  Goal: Absence of Infection Signs and Symptoms  Outcome: Ongoing, Progressing     Problem: Adult Inpatient Plan of Care  Goal: Plan of Care Review  Outcome: Ongoing, Progressing  Goal: Patient-Specific Goal (Individualized)  Outcome: Ongoing, Progressing  Goal: Absence of Hospital-Acquired Illness or Injury  Outcome: Ongoing, Progressing  Goal: Optimal Comfort and Wellbeing  Outcome: Ongoing, Progressing  Goal: Readiness for Transition of Care  Outcome: Ongoing, Progressing     Problem: Bariatric Environmental Safety  Goal: Safety Maintained with Care  Outcome: Ongoing, Progressing     Problem: Skin Injury Risk Increased  Goal: Skin Health and Integrity  Outcome: Ongoing, Progressing

## 2022-05-19 NOTE — PROGRESS NOTES
Pharmacist Renal Dose Adjustment Note    Francis Bains is a 32 y.o. female being treated with the medication famotidine    Patient Data:    Vital Signs (Most Recent):  Temp: 97 °F (36.1 °C) (05/18/22 1345)  Pulse: 90 (05/19/22 0145)  Resp: 18 (05/19/22 0145)  BP: 117/73 (05/19/22 0145)  SpO2: 100 % (05/19/22 0145) Vital Signs (72h Range):  Temp:  [97 °F (36.1 °C)]   Pulse:  [83-96]   Resp:  [16-20]   BP: (117-128)/(73-79)   SpO2:  [99 %-100 %]      Recent Labs   Lab 05/18/22  1401 05/18/22  1744   CREATININE 4.2* 3.4*     Serum creatinine: 3.4 mg/dL (H) 05/18/22 1744  Estimated creatinine clearance: 26.7 mL/min (A)    Medication:famotidine dose: 40 mg frequency daily will be changed to medication:famotidine dose:20 mg frequency:daily    Pharmacist's Name: Angelia Graves  Pharmacist's Extension: [phone]
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
## 2022-05-19 NOTE — H&P
[facility] ([address] [place] [address] Medicine  History & Physical    Patient Name: Francis Bains  MRN: [id number]  Patient Class: IP- Inpatient  Admission Date: 5/18/2022  Attending Physician: Anthony Duncan MD   Primary Care Provider: Tereza Magaña MD         Patient information was obtained from patient, relative(s) and ER records.     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 She is here with a female relative. She has been coughing- mostly in am for last couple months.  She has been coughing so much she will throw up mucus. She takes an allergy pill  from PCP and that helped. She also reports that she has leg swelling. Mother reports that she hs been weak and not ambulating like usual. Only using rollator to bathroom and back. Mother reports that her appetite has been reduced. She was seen a couple [facility] in [facility] for same complaints but was sent home- dx with anxiety. When she was worked up in ER and noted to be significantly dehydrated. She was given 5L NS and creat 4.2>>2.8 this am. She reports that she sees Dr Olivas and her last GFR was 16%. Now she has a dry cough and continues to c/o leg pain.      Past Medical History:   Diagnosis Date    Cerebral palsy     Hearing impaired person     Hypertension     Osteopenia determined by x-ray     Oswaldo syndrome        Past Surgical History:   Procedure Laterality Date    CHOLECYSTECTOMY      INNER EAR SURGERY      RENAL BIOPSY  07/07/14       Review of patient's allergies indicates:   Allergen Reactions    Ace inhibitors Other (See Comments)    Nabumetone Other (See Comments)    Nsaids (non-steroidal anti-inflammatory drug) Other (See Comments)    Sulfa (sulfonamide antibiotics)  Hives    Pcn  [penicillins] Rash       No current facility-administered medications on file prior to encounter.     Current Outpatient Medications on File Prior to Encounter   Medication Sig    amlodipine (NORVASC) 10 MG tablet Take 10 mg by mouth once daily.    carvedilol (COREG) 6.25 MG tablet 12.5 mg.     estrogen, conjugated,-medroxyprogesterone 0.625-2.5mg (PREMPRO) 0.625-2.5 mg per tablet Take 1 tablet by mouth once daily.    famotidine (PEPCID) 20 MG tablet TAKE 1 TABLET PO TWICE A DAY    losartan (COZAAR) 50 MG tablet losartan 50 mg tablet    pantoprazole (PROTONIX) 40 MG tablet TK 1 T PO  D    sertraline (ZOLOFT) 100 MG tablet     traZODone (DESYREL) 50 MG tablet     alendronate (FOSAMAX) 70 MG tablet Take 1 tablet (70 mg total) by mouth every 7 days.    clonazePAM (KLONOPIN) 1 MG tablet Take 1 tablet (1 mg total) by mouth daily as needed for Anxiety.    ergocalciferol (ERGOCALCIFEROL) 50,000 unit Cap Vitamin D2 50,000 unit capsule     Family History    None       Tobacco Use    Smoking status: Current Some Day Smoker     Packs/day: 0.25     Years: 9.00     Pack years: 2.25    Smokeless tobacco: Never Used   Substance and Sexual Activity    Alcohol use: Yes     Comment: Social    Drug use: Yes     Types: Marijuana    Sexual activity: Yes     Partners: Male     Birth control/protection: Inserts     Comment: Single.     Review of Systems   Constitutional:  Negative for chills and fever.   HENT:  Positive for congestion. Negative for ear pain, postnasal drip, rhinorrhea, sore throat and trouble swallowing.    Eyes:  Negative for redness and itching.   Respiratory:  Positive for cough. Negative for shortness of breath and wheezing.    Cardiovascular:  Negative for chest pain and palpitations.   Gastrointestinal:  Negative for abdominal pain, diarrhea, nausea and vomiting.   Genitourinary:  Negative for dysuria and frequency.   Musculoskeletal:  Positive for myalgias.   Skin:  Negative for rash.    Neurological:  Positive for weakness. Negative for headaches.   Objective:     Vital Signs (Most Recent):  Temp: 97.5 °F (36.4 °C) (05/19/22 1030)  Pulse: 107 (05/19/22 1043)  Resp: 20 (05/19/22 1030)  BP: (!) 123/98 (05/19/22 1030)  SpO2: 100 % (05/19/22 1030)   Vital Signs (24h Range):  Temp:  [97.5 °F (36.4 °C)] 97.5 °F (36.4 °C)  Pulse:  [] 107  Resp:  [16-21] 20  SpO2:  [98 %-100 %] 100 %  BP: (117-148)/() 123/98     Weight: 95.7 kg (211 lb)  Body mass index is 37.38 kg/m².    Physical Exam  Vitals and nursing note reviewed.   Constitutional:       General: She is not in acute distress.     Appearance: She is well-developed. She is obese.   HENT:      Head: Normocephalic and atraumatic.   Eyes:      Conjunctiva/sclera: Conjunctivae normal.      Pupils: Pupils are equal, round, and reactive to light.   Neck:      Thyroid: No thyromegaly.   Cardiovascular:      Rate and Rhythm: Normal rate and regular rhythm.      Heart sounds: Normal heart sounds.   Pulmonary:      Effort: Pulmonary effort is normal. No respiratory distress.      Breath sounds: Rhonchi present. No wheezing.   Abdominal:      General: Bowel sounds are normal.      Palpations: Abdomen is soft.      Tenderness: There is no abdominal tenderness.   Musculoskeletal:         General: Tenderness (to touch bilateral legs) present. Normal range of motion.      Cervical back: Normal range of motion and neck supple.   Lymphadenopathy:      Cervical: No cervical adenopathy.   Skin:     General: Skin is warm and dry.      Findings: No rash.   Neurological:      Mental Status: She is alert and oriented to person, place, and time.   Psychiatric:         Behavior: Behavior normal.         CRANIAL NERVES     CN III, IV, VI   Pupils are equal, round, and reactive to light.     Significant Labs:   CBC:   Recent Labs   Lab 05/18/22  1401 05/19/22  0619   WBC 5.93 7.36   HGB 11.3* 9.0*   HCT 33.2* 26.7*    207     CMP:   Recent Labs   Lab  05/18/22  1401 05/18/22  1744 05/19/22  0619   * 137 139   K 4.0 4.0 3.0*    111* 106   CO2 13* 8* 20*   GLU 99 85 103   BUN 52* 44* 37*   CREATININE 4.2* 3.4* 2.8*   CALCIUM 9.4 7.9* 7.7*   PROT 7.5  --  5.2*   ALBUMIN 3.5  --  2.4*   BILITOT 0.3  --  0.2   ALKPHOS 80  --  56   AST 39  --  37   ALT 18  --  15   ANIONGAP 17* 18* 13   EGFRNONAA 13* 17* 22*   Phos 3.9  Cardiac Markers:   Recent Labs   Lab 05/18/22  1401   BNP <10     Recent Labs   Lab 05/18/22  1401   *  378*   CPKMB 5.1   TROPONINI 0.022   MB 1.3       Lipase:   Recent Labs   Lab 05/18/22  1401   LIPASE 87*     Magnesium:   Recent Labs   Lab 05/18/22  1401   MG 3.1*     TSH:   Recent Labs   Lab 05/18/22  1401   TSH 4.301*     UDS + benzo and THC   Urine Studies:   Recent Labs   Lab 05/18/22  1518   COLORU Yellow   APPEARANCEUA Clear   PHUR 5.0   SPECGRAV 1.015   PROTEINUA Negative   GLUCUA Negative   KETONESU Trace*   BILIRUBINUA 2+*   OCCULTUA Negative   NITRITE Negative   UROBILINOGEN Negative   LEUKOCYTESUR Negative   UPT negative     Mono negative  Group a strept negative  Flu negative    Significant Imaging:     KUB Nonspecific bowel gas pattern without evidence of focal obstruction.    EKG Normal sinus rhythm  Normal ECG  No previous ECGs available  Confirmed by Cedrick Fink MD ([id number]) on 5/18/2022 6:02:26 PM    Assessment/Plan:     * Dehydration  Pt reports that she was last seen by dr Forbes and told she had 16% renal function. ON admission GFR was 13. Given 5L NS and repeat GFR now 22. Stop fluids. Called for records -- last note from dr Olivas    Initially thought to have RTA - acidotic on admit and given bicarb.  This was stopped.    Hypokalemia  Replace po      CKD (chronic kidney disease) stage 4, GFR 15-29 ml/min  Pt reports that she was last seen by dr Forbes and told she had 16% renal function. ON admission GFR was 13. Given 5L NS and repeat GFR now 22. Stop fluids. Called for records -- last note from   [facility]      DENTON (generalized anxiety disorder)  Resume zoloft   Klonopin as needed    HTN (hypertension)  bp 117//83  Hold bp meds for now (losartan and amlodipine - ok for coreg as hr elevated 107)  Sees Dr. Olivas who has her on these meds.      Major depression  Cont zoloft      Infantile cerebral palsy, unspecified  Debility has been progressive- legs feels heavy and swollen. No pitting edema. CK marginally elevated  Consult neuro   She seems to be able to ambulate at baseline per her report, but weakness has occurred over the recent weeks.      VTE Risk Mitigation (From admission, onward)         Ordered     IP VTE HIGH RISK PATIENT  Once         05/19/22 0227     Place sequential compression device  Until discontinued         05/19/22 0227                   Destiney Pizano MD  Department of Hospital Medicine   [facility] (3rd Fl)

## 2022-05-19 NOTE — ASSESSMENT & PLAN NOTE
Pt reports that she was last seen by dr Forbes and told she had 16% renal function. ON admission GFR was 13. Given 5L NS and repeat GFR now 22. Stop fluids. Called for records -- last note from dr Olivas    Initially thought to have RTA - acidotic on admit and given bicarb.  This was stopped.

## 2022-05-19 NOTE — SUBJECTIVE & OBJECTIVE
Past Medical History:   Diagnosis Date    Cerebral palsy     Hearing impaired person     Hypertension     Osteopenia determined by x-ray     Oswaldo syndrome        Past Surgical History:   Procedure Laterality Date    CHOLECYSTECTOMY      INNER EAR SURGERY      RENAL BIOPSY  07/07/14       Review of patient's allergies indicates:   Allergen Reactions    Ace inhibitors Other (See Comments)    Nabumetone Other (See Comments)    Nsaids (non-steroidal anti-inflammatory drug) Other (See Comments)    Sulfa (sulfonamide antibiotics) Hives    Pcn  [penicillins] Rash       No current facility-administered medications on file prior to encounter.     Current Outpatient Medications on File Prior to Encounter   Medication Sig    amlodipine (NORVASC) 10 MG tablet Take 10 mg by mouth once daily.    carvedilol (COREG) 6.25 MG tablet 12.5 mg.     estrogen, conjugated,-medroxyprogesterone 0.625-2.5mg (PREMPRO) 0.625-2.5 mg per tablet Take 1 tablet by mouth once daily.    famotidine (PEPCID) 20 MG tablet TAKE 1 TABLET PO TWICE A DAY    losartan (COZAAR) 50 MG tablet losartan 50 mg tablet    pantoprazole (PROTONIX) 40 MG tablet TK 1 T PO  D    sertraline (ZOLOFT) 100 MG tablet     traZODone (DESYREL) 50 MG tablet     alendronate (FOSAMAX) 70 MG tablet Take 1 tablet (70 mg total) by mouth every 7 days.    clonazePAM (KLONOPIN) 1 MG tablet Take 1 tablet (1 mg total) by mouth daily as needed for Anxiety.    ergocalciferol (ERGOCALCIFEROL) 50,000 unit Cap Vitamin D2 50,000 unit capsule     Family History    None       Tobacco Use    Smoking status: Current Some Day Smoker     Packs/day: 0.25     Years: 9.00     Pack years: 2.25    Smokeless tobacco: Never Used   Substance and Sexual Activity    Alcohol use: Yes     Comment: Social    Drug use: Yes     Types: Marijuana    Sexual activity: Yes     Partners: Male     Birth control/protection: Inserts     Comment: Single.     Review of Systems   Constitutional:  Negative for chills and  fever.   HENT:  Positive for congestion. Negative for ear pain, postnasal drip, rhinorrhea, sore throat and trouble swallowing.    Eyes:  Negative for redness and itching.   Respiratory:  Positive for cough. Negative for shortness of breath and wheezing.    Cardiovascular:  Negative for chest pain and palpitations.   Gastrointestinal:  Negative for abdominal pain, diarrhea, nausea and vomiting.   Genitourinary:  Negative for dysuria and frequency.   Musculoskeletal:  Positive for myalgias.   Skin:  Negative for rash.   Neurological:  Positive for weakness. Negative for headaches.   Objective:     Vital Signs (Most Recent):  Temp: 97.5 °F (36.4 °C) (05/19/22 1030)  Pulse: 107 (05/19/22 1043)  Resp: 20 (05/19/22 1030)  BP: (!) 123/98 (05/19/22 1030)  SpO2: 100 % (05/19/22 1030)   Vital Signs (24h Range):  Temp:  [97.5 °F (36.4 °C)] 97.5 °F (36.4 °C)  Pulse:  [] 107  Resp:  [16-21] 20  SpO2:  [98 %-100 %] 100 %  BP: (117-148)/() 123/98     Weight: 95.7 kg (211 lb)  Body mass index is 37.38 kg/m².    Physical Exam  Vitals and nursing note reviewed.   Constitutional:       General: She is not in acute distress.     Appearance: She is well-developed. She is obese.   HENT:      Head: Normocephalic and atraumatic.   Eyes:      Conjunctiva/sclera: Conjunctivae normal.      Pupils: Pupils are equal, round, and reactive to light.   Neck:      Thyroid: No thyromegaly.   Cardiovascular:      Rate and Rhythm: Normal rate and regular rhythm.      Heart sounds: Normal heart sounds.   Pulmonary:      Effort: Pulmonary effort is normal. No respiratory distress.      Breath sounds: Rhonchi present. No wheezing.   Abdominal:      General: Bowel sounds are normal.      Palpations: Abdomen is soft.      Tenderness: There is no abdominal tenderness.   Musculoskeletal:         General: Tenderness (to touch bilateral legs) present. Normal range of motion.      Cervical back: Normal range of motion and neck supple.    Lymphadenopathy:      Cervical: No cervical adenopathy.   Skin:     General: Skin is warm and dry.      Findings: No rash.   Neurological:      Mental Status: She is alert and oriented to person, place, and time.   Psychiatric:         Behavior: Behavior normal.         CRANIAL NERVES     CN III, IV, VI   Pupils are equal, round, and reactive to light.     Significant Labs:   CBC:   Recent Labs   Lab 05/18/22  1401 05/19/22  0619   WBC 5.93 7.36   HGB 11.3* 9.0*   HCT 33.2* 26.7*    207     CMP:   Recent Labs   Lab 05/18/22  1401 05/18/22  1744 05/19/22  0619   * 137 139   K 4.0 4.0 3.0*    111* 106   CO2 13* 8* 20*   GLU 99 85 103   BUN 52* 44* 37*   CREATININE 4.2* 3.4* 2.8*   CALCIUM 9.4 7.9* 7.7*   PROT 7.5  --  5.2*   ALBUMIN 3.5  --  2.4*   BILITOT 0.3  --  0.2   ALKPHOS 80  --  56   AST 39  --  37   ALT 18  --  15   ANIONGAP 17* 18* 13   EGFRNONAA 13* 17* 22*   Phos 3.9  Cardiac Markers:   Recent Labs   Lab 05/18/22  1401   BNP <10     Recent Labs   Lab 05/18/22  1401   *  378*   CPKMB 5.1   TROPONINI 0.022   MB 1.3       Lipase:   Recent Labs   Lab 05/18/22  1401   LIPASE 87*     Magnesium:   Recent Labs   Lab 05/18/22  1401   MG 3.1*     TSH:   Recent Labs   Lab 05/18/22  1401   TSH 4.301*     UDS + benzo and THC   Urine Studies:   Recent Labs   Lab 05/18/22  1518   COLORU Yellow   APPEARANCEUA Clear   PHUR 5.0   SPECGRAV 1.015   PROTEINUA Negative   GLUCUA Negative   KETONESU Trace*   BILIRUBINUA 2+*   OCCULTUA Negative   NITRITE Negative   UROBILINOGEN Negative   LEUKOCYTESUR Negative   UPT negative     Mono negative  Group a strept negative  Flu negative    Significant Imaging:     KUB Nonspecific bowel gas pattern without evidence of focal obstruction.    EKG Normal sinus rhythm  Normal ECG  No previous ECGs available  Confirmed by Cedrick Fink MD ([id number]) on 5/18/2022 6:02:26 PM

## 2022-05-20 VITALS
TEMPERATURE: 97 F | WEIGHT: 211 LBS | OXYGEN SATURATION: 99 % | HEIGHT: 63 IN | BODY MASS INDEX: 37.39 KG/M2 | RESPIRATION RATE: 16 BRPM | DIASTOLIC BLOOD PRESSURE: 93 MMHG | SYSTOLIC BLOOD PRESSURE: 131 MMHG | HEART RATE: 90 BPM

## 2022-05-20 LAB
ANION GAP SERPL CALC-SCNC: 17 MMOL/L (ref 8–16)
BASOPHILS # BLD AUTO: 0.01 K/UL (ref 0–0.2)
BASOPHILS NFR BLD: 0.2 % (ref 0–1.9)
BUN SERPL-MCNC: 28 MG/DL (ref 6–20)
CALCIUM SERPL-MCNC: 8.1 MG/DL (ref 8.7–10.5)
CHLORIDE SERPL-SCNC: 101 MMOL/L (ref 95–110)
CO2 SERPL-SCNC: 23 MMOL/L (ref 23–29)
CREAT SERPL-MCNC: 2.3 MG/DL (ref 0.5–1.4)
CRP SERPL-MCNC: 37.6 MG/L (ref 0–8.2)
DIFFERENTIAL METHOD: ABNORMAL
EOSINOPHIL # BLD AUTO: 0.1 K/UL (ref 0–0.5)
EOSINOPHIL NFR BLD: 1.6 % (ref 0–8)
ERYTHROCYTE [DISTWIDTH] IN BLOOD BY AUTOMATED COUNT: 14.6 % (ref 11.5–14.5)
EST. GFR  (AFRICAN AMERICAN): 31 ML/MIN/1.73 M^2
EST. GFR  (NON AFRICAN AMERICAN): 27 ML/MIN/1.73 M^2
GLUCOSE SERPL-MCNC: 82 MG/DL (ref 70–110)
HCT VFR BLD AUTO: 27.6 % (ref 37–48.5)
HGB BLD-MCNC: 9.2 G/DL (ref 12–16)
IMM GRANULOCYTES # BLD AUTO: 0.05 K/UL (ref 0–0.04)
IMM GRANULOCYTES NFR BLD AUTO: 0.9 % (ref 0–0.5)
LYMPHOCYTES # BLD AUTO: 1.1 K/UL (ref 1–4.8)
LYMPHOCYTES NFR BLD: 18.4 % (ref 18–48)
MCH RBC QN AUTO: 32.1 PG (ref 27–31)
MCHC RBC AUTO-ENTMCNC: 33.3 G/DL (ref 32–36)
MCV RBC AUTO: 96 FL (ref 82–98)
MONOCYTES # BLD AUTO: 0.7 K/UL (ref 0.3–1)
MONOCYTES NFR BLD: 11.5 % (ref 4–15)
NEUTROPHILS # BLD AUTO: 3.9 K/UL (ref 1.8–7.7)
NEUTROPHILS NFR BLD: 67.4 % (ref 38–73)
NRBC BLD-RTO: 0 /100 WBC
PLATELET # BLD AUTO: 185 K/UL (ref 150–450)
PMV BLD AUTO: 9.5 FL (ref 9.2–12.9)
POTASSIUM SERPL-SCNC: 3.3 MMOL/L (ref 3.5–5.1)
RBC # BLD AUTO: 2.87 M/UL (ref 4–5.4)
SODIUM SERPL-SCNC: 141 MMOL/L (ref 136–145)
WBC # BLD AUTO: 5.76 K/UL (ref 3.9–12.7)

## 2022-05-20 PROCEDURE — 85025 COMPLETE CBC W/AUTO DIFF WBC: CPT | Performed by: NURSE PRACTITIONER

## 2022-05-20 PROCEDURE — 25000003 PHARM REV CODE 250: Performed by: SURGERY

## 2022-05-20 PROCEDURE — 97162 PT EVAL MOD COMPLEX 30 MIN: CPT

## 2022-05-20 PROCEDURE — 80048 BASIC METABOLIC PNL TOTAL CA: CPT | Performed by: NURSE PRACTITIONER

## 2022-05-20 PROCEDURE — 99232 PR SUBSEQUENT HOSPITAL CARE,LEVL II: ICD-10-PCS | Mod: ,,, | Performed by: INTERNAL MEDICINE

## 2022-05-20 PROCEDURE — 25000003 PHARM REV CODE 250: Performed by: NURSE PRACTITIONER

## 2022-05-20 PROCEDURE — 99232 SBSQ HOSP IP/OBS MODERATE 35: CPT | Mod: ,,, | Performed by: INTERNAL MEDICINE

## 2022-05-20 PROCEDURE — 63600175 PHARM REV CODE 636 W HCPCS: Performed by: SURGERY

## 2022-05-20 PROCEDURE — G0378 HOSPITAL OBSERVATION PER HR: HCPCS

## 2022-05-20 PROCEDURE — 36415 COLL VENOUS BLD VENIPUNCTURE: CPT | Performed by: NURSE PRACTITIONER

## 2022-05-20 RX ORDER — FAMOTIDINE 20 MG/1
20 TABLET, FILM COATED ORAL 2 TIMES DAILY
Qty: 60 TABLET | Refills: 0 | Status: ON HOLD | OUTPATIENT
Start: 2022-05-20 | End: 2022-06-10 | Stop reason: HOSPADM

## 2022-05-20 RX ORDER — POTASSIUM CHLORIDE 20 MEQ/1
40 TABLET, EXTENDED RELEASE ORAL ONCE
Status: COMPLETED | OUTPATIENT
Start: 2022-05-20 | End: 2022-05-20

## 2022-05-20 RX ORDER — ONDANSETRON 4 MG/1
4 TABLET, FILM COATED ORAL EVERY 12 HOURS PRN
Qty: 20 TABLET | Refills: 0 | Status: SHIPPED | OUTPATIENT
Start: 2022-05-20 | End: 2022-05-25

## 2022-05-20 RX ORDER — TRAMADOL HYDROCHLORIDE 50 MG/1
50 TABLET ORAL EVERY 6 HOURS PRN
Status: DISCONTINUED | OUTPATIENT
Start: 2022-05-20 | End: 2022-05-20 | Stop reason: HOSPADM

## 2022-05-20 RX ADMIN — CARVEDILOL 6.25 MG: 3.12 TABLET, FILM COATED ORAL at 09:05

## 2022-05-20 RX ADMIN — CLONAZEPAM 1 MG: 1 TABLET ORAL at 09:05

## 2022-05-20 RX ADMIN — BENZONATATE 100 MG: 100 CAPSULE ORAL at 09:05

## 2022-05-20 RX ADMIN — FAMOTIDINE 20 MG: 20 TABLET ORAL at 09:05

## 2022-05-20 RX ADMIN — POTASSIUM CHLORIDE 40 MEQ: 1500 TABLET, EXTENDED RELEASE ORAL at 12:05

## 2022-05-20 RX ADMIN — ONDANSETRON HYDROCHLORIDE 4 MG: 2 SOLUTION INTRAMUSCULAR; INTRAVENOUS at 08:05

## 2022-05-20 RX ADMIN — TRAMADOL HYDROCHLORIDE 50 MG: 50 TABLET, FILM COATED ORAL at 01:05

## 2022-05-20 RX ADMIN — SERTRALINE HYDROCHLORIDE 50 MG: 50 TABLET ORAL at 09:05

## 2022-05-20 NOTE — DISCHARGE SUMMARY
[facility] ([facility])  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 She is here with a female relative. She has been coughing- mostly in am for last couple months.  She has been coughing so much she will throw up mucus. She takes an allergy pill  from PCP and that helped. She also reports that she has leg swelling. Mother reports that she hs been weak and not ambulating like usual. Only using rollator to bathroom and back. Mother reports that her appetite has been reduced. [facility] same complaints but was sent home- dx with anxiety. When she was worked up in ER and noted to be significantly dehydrated. She was given 5L NS and creat 4.2>>2.8 this am. She reports that she sees Dr Olivas and her last GFR was 16%. Now she has a dry cough and continues to c/o leg pain.      * No surgery found *      Hospital Course:   Pt was admitted yesterday with c.o leg weakness and needed to have neuro w/u. Ct head and c spine done this am without acute abnormality. PT consulted. She was in radiology when he rounded this am. Will have him see her this afternoon. Elevated rheumatology markers. Neuro rec f/u with her as well as rheumatology. Renal function is better than baseline        Goals of Care Treatment Preferences:  Code Status: Full Code      Consults:   Consults (From admission, onward)        Status Ordering Provider     Inpatient consult to Neurology  Once        Provider:  Wilmar MOURA  MD Tyra    Completed FLORENTIN JAIMES     Inpatient consult to Social Work/Case Management  Once        Provider:  (Not yet assigned)    Acknowledged RAHAT TO          * Dehydration  Pt reports that she was last seen by dr Forbes and told she had 16% renal function. ON admission GFR was 13. Given 5L NS and repeat GFR now 27. Stop fluids. Called for records -- last note from dr Olivas    Initially thought to have RTA - acidotic on admit and given bicarb.  This was stopped.    Hypokalemia  Replace po      CKD (chronic kidney disease) stage 4, GFR 15-29 ml/min  Pt reports that she was last seen by dr Forbes and told she had 16% renal function. ON admission GFR was 13. Given 5L NS and repeat GFR now 27. Stop fluids. Called for records -- last note from dr Olivas      DENTON (generalized anxiety disorder)  Resume zoloft   Klonopin as needed    HTN (hypertension)  bp 117//83  Hold bp meds for now (losartan and amlodipine - ok for coreg as hr elevated 107)  Sees Dr. Olivas who has her on these meds.  Bp ok 124/81 cont to hold arb    Major depression  Cont zoloft      Infantile cerebral palsy, unspecified  Debility has been progressive- legs feels heavy and swollen. No pitting edema. CK marginally elevated  Consult neuro   She seems to be able to ambulate at baseline per her report, but weakness has occurred over the recent weeks  elevated rheumatology numbers will have f.u with rheumatology as well as neuro       Final Active Diagnoses:    Diagnosis Date Noted POA    PRINCIPAL PROBLEM:  Dehydration [E86.0] 05/19/2022 Yes    Major depression [F32.9] 05/19/2022 Yes    HTN (hypertension) [I10] 05/19/2022 Yes    DENTON (generalized anxiety disorder) [F41.1] 05/19/2022 Yes    CKD (chronic kidney disease) stage 4, GFR 15-29 ml/min [N18.4] 05/19/2022 Yes    Hypokalemia [E87.6] 05/19/2022 Yes    Infantile cerebral palsy, unspecified [G80.9] 05/04/2012 Yes      Problems Resolved During this Admission:        Discharged Condition: good    Disposition: Home or Self Care    Follow Up:   Follow-up Information     Wilmar Mary MD Follow up in 3 week(s).    Specialty: Neurology  Contact information:  [address]  [place] [id number] [phone]             Eric Aquino MD. Go on 5/23/2022.    Specialty: Family Medicine  Why: Hospital Follow-up at 8am  Contact information:  [address]  [facility]  [place] [id number] [phone]                       Patient Instructions:   No discharge procedures on file.    Significant Diagnostic Studies:       CBC:   Recent Labs   Lab 05/18/22  1401 05/19/22  0619 05/20/22  0634   WBC 5.93 7.36 5.76   HGB 11.3* 9.0* 9.2*   HCT 33.2* 26.7* 27.6*    207 185       CMP:   Recent Labs   Lab 05/18/22  1401 05/18/22  1744 05/19/22  0619 05/20/22  0634   * 137 139 141   K 4.0 4.0 3.0* 3.3*    111* 106 101   CO2 13* 8* 20* 23   GLU 99 85 103 82   BUN 52* 44* 37* 28*   CREATININE 4.2* 3.4* 2.8* 2.3*   CALCIUM 9.4 7.9* 7.7* 8.1*   PROT 7.5  --  5.2*  --    ALBUMIN 3.5  --  2.4*  --    BILITOT 0.3  --  0.2  --    ALKPHOS 80  --  56  --    AST 39  --  37  --    ALT 18  --  15  --    ANIONGAP 17* 18* 13 17*   EGFRNONAA 13* 17* 22* 27*     Phos 3.9  Cardiac Markers:   Recent Labs   Lab 05/18/22  1401   BNP <10       Recent Labs   Lab 05/18/22  1401 05/19/22  1949   *  378* 535*   CPKMB 5.1  --    TROPONINI 0.022  --    MB 1.3  --      Myasthenia gravis panel in process  CRP 37.6  SED RATE 60  Lipase:   Recent Labs   Lab 05/18/22  1401   LIPASE 87*       Magnesium:   Recent Labs   Lab 05/18/22  1401   MG 3.1*       TSH:   Recent Labs   Lab 05/18/22  1401   TSH 4.301*       UDS + benzo and THC   Urine Studies:   Recent Labs   Lab 05/18/22  1518   COLORU Yellow   APPEARANCEUA Clear   PHUR 5.0   SPECGRAV 1.015   PROTEINUA Negative   GLUCUA Negative   KETONESU Trace*   BILIRUBINUA 2+*   OCCULTUA Negative   NITRITE Negative   UROBILINOGEN Negative    LEUKOCYTESUR Negative     UPT negative     Mono negative  Group a strept negative  Flu negative    Significant Imaging:     CT head No CT evidence of an acute intracranial abnormality.     Limitations due to artifact created by the patient's cochlear implant device.  No significant change when compared with the previous study of June 16, 2013.    CT cervical spine    KUB Nonspecific bowel gas pattern without evidence of focal obstruction.    EKG Normal sinus rhythm  Normal ECG  No previous ECGs available  Confirmed by Cedrick Fink MD ([id number]) on 5/18/2022 6:02:26 PM    Pending Diagnostic Studies:     Procedure Component Value Units Date/Time    Myasthenia Gravis/Lambert-Eaton [[id number]] Collected: 05/19/22 1949    Order Status: Sent Lab Status: In process Updated: 05/20/22 0534    Specimen: Blood          Medications:  Reconciled Home Medications:      Medication List      CONTINUE taking these medications    alendronate 70 MG tablet  Commonly known as: FOSAMAX  Take 1 tablet (70 mg total) by mouth every 7 days.     amLODIPine 10 MG tablet  Commonly known as: NORVASC  Take 10 mg by mouth once daily.     carvediloL 6.25 MG tablet  Commonly known as: COREG  12.5 mg.     clonazePAM 1 MG tablet  Commonly known as: KlonoPIN  Take 1 tablet (1 mg total) by mouth daily as needed for Anxiety.     ergocalciferol 50,000 unit Cap  Commonly known as: ERGOCALCIFEROL  Vitamin D2 50,000 unit capsule     famotidine 20 MG tablet  Commonly known as: PEPCID  TAKE 1 TABLET PO TWICE A DAY     pantoprazole 40 MG tablet  Commonly known as: PROTONIX  TK 1 T PO  D     PREMPRO 0.625-2.5 mg per tablet  Generic drug: estrogen (conjugated)-medroxyprogesterone 0.625-2.5mg  Take 1 tablet by mouth once daily.     sertraline 100 MG tablet  Commonly known as: ZOLOFT     traZODone 50 MG tablet  Commonly known as: DESYREL        STOP taking these medications    losartan 50 MG tablet  Commonly known as: COZAAR            Indwelling Lines/Drains at time  of discharge:   Lines/Drains/Airways     None                 Time spent on the discharge of patient: 20 minutes         Arina Whitehead NP  Department of Hospital Medicine  [place] [facility] (3rd Fl)

## 2022-05-20 NOTE — PLAN OF CARE
Problem: Adult Inpatient Plan of Care  Goal: Plan of Care Review  Outcome: Ongoing, Progressing     Problem: Adult Inpatient Plan of Care  Goal: Patient-Specific Goal (Individualized)  Outcome: Ongoing, Progressing     Problem: Adult Inpatient Plan of Care  Goal: Optimal Comfort and Wellbeing  Outcome: Ongoing, Progressing

## 2022-05-20 NOTE — PLAN OF CARE
Problem: Physical Therapy  Goal: Physical Therapy Goal  Description: Goals to be met by: 2022    Patient will increase functional independence with mobility by performin. Supine to sit with Standby Assistance.  2. Sit to supine with Standby Assistance.  3. Bed to chair transfer with Minimal Assistance with rolling walker using Step Transfer technique.  4. Sit to Stand with Minimal Assistance with rolling walker.  5. Gait  x 10  feet with Minimal Assistance with rolling walker.  6. Lower extremity exercise program x10 reps, with assistance    Outcome: Plan of care established

## 2022-05-20 NOTE — ASSESSMENT & PLAN NOTE
Pt reports that she was last seen by dr Forbes and told she had 16% renal function. ON admission GFR was 13. Given 5L NS and repeat GFR now 27. Stop fluids. Called for records -- last note from dr Olivas

## 2022-05-20 NOTE — HOSPITAL COURSE
Pt was admitted yesterday with c.o leg weakness and needed to have neuro w/u. Ct head and c spine done this am without acute abnormality. PT consulted. She was in radiology when he rounded this am. Will have him see her this afternoon. Elevated rheumatology markers. Neuro rec f/u with her as well as rheumatology. Renal function is better than baseline

## 2022-05-20 NOTE — PLAN OF CARE
Problem: Infection  Goal: Absence of Infection Signs and Symptoms  5/20/2022 1546 by Niya Alexander RN  Outcome: Met  5/20/2022 1520 by Niya Alexander RN  Outcome: Ongoing, Progressing     Problem: Adult Inpatient Plan of Care  Goal: Plan of Care Review  5/20/2022 1546 by Niya Alexander RN  Outcome: Met  5/20/2022 1520 by Niya Alexander RN  Outcome: Ongoing, Progressing  Goal: Patient-Specific Goal (Individualized)  5/20/2022 1546 by Niya Alexander RN  Outcome: Met  5/20/2022 1520 by Niya Alexander RN  Outcome: Ongoing, Progressing  Goal: Absence of Hospital-Acquired Illness or Injury  5/20/2022 1546 by Niya Alexander RN  Outcome: Met  5/20/2022 1520 by Niya Alexander RN  Outcome: Ongoing, Progressing  Goal: Optimal Comfort and Wellbeing  5/20/2022 1546 by Niya Alexander RN  Outcome: Met  5/20/2022 1520 by Niya Alexander RN  Outcome: Ongoing, Progressing  Goal: Readiness for Transition of Care  5/20/2022 1546 by Niya Alexander RN  Outcome: Met  5/20/2022 1520 by Niya Alexander RN  Outcome: Ongoing, Progressing     Problem: Bariatric Environmental Safety  Goal: Safety Maintained with Care  5/20/2022 1546 by Niya Alexander RN  Outcome: Met  5/20/2022 1520 by Niya Alexander RN  Outcome: Ongoing, Progressing     Problem: Skin Injury Risk Increased  Goal: Skin Health and Integrity  5/20/2022 1546 by Niya Alexander RN  Outcome: Met  5/20/2022 1520 by Niya Alexander RN  Outcome: Ongoing, Progressing     Problem: Pain Acute  Goal: Acceptable Pain Control and Functional Ability  5/20/2022 1546 by Niya Alexander RN  Outcome: Met  5/20/2022 1520 by Niya Alexander RN  Outcome: Ongoing, Progressing     Problem: Electrolyte Imbalance  Goal: Electrolyte Balance  5/20/2022 1546 by Niya Alexander RN  Outcome: Met  5/20/2022 1520 by Niya Alexander RN  Outcome: Ongoing, Progressing     Problem: Balance Impairment (Functional Deficit)  Goal: Improved Balance and Postural Control  5/20/2022 1546 by Niya Catherine, RN  Outcome: Met  5/20/2022 1520 by Niya  CHARITY Alexander  Outcome: Ongoing, Progressing     Problem: Muscle Strength Impairment (Functional Deficit)  Goal: Improved Muscle Strength  5/20/2022 1546 by Niya Alexander RN  Outcome: Met  5/20/2022 1520 by Niya Alexander RN  Outcome: Ongoing, Progressing     Problem: Sensory Impairment (Functional Deficit)  Goal: Compensation for Sensory Deficit  5/20/2022 1546 by Niya Alexander RN  Outcome: Met  5/20/2022 1520 by Niya Alexander RN  Outcome: Ongoing, Progressing

## 2022-05-20 NOTE — PLAN OF CARE
Problem: Infection  Goal: Absence of Infection Signs and Symptoms  Outcome: Ongoing, Progressing     Problem: Adult Inpatient Plan of Care  Goal: Plan of Care Review  Outcome: Ongoing, Progressing  Goal: Patient-Specific Goal (Individualized)  Outcome: Ongoing, Progressing  Goal: Absence of Hospital-Acquired Illness or Injury  Outcome: Ongoing, Progressing  Goal: Optimal Comfort and Wellbeing  Outcome: Ongoing, Progressing  Goal: Readiness for Transition of Care  Outcome: Ongoing, Progressing     Problem: Bariatric Environmental Safety  Goal: Safety Maintained with Care  Outcome: Ongoing, Progressing     Problem: Skin Injury Risk Increased  Goal: Skin Health and Integrity  Outcome: Ongoing, Progressing     Problem: Pain Acute  Goal: Acceptable Pain Control and Functional Ability  Outcome: Ongoing, Progressing     Problem: Electrolyte Imbalance  Goal: Electrolyte Balance  Outcome: Ongoing, Progressing     Problem: Balance Impairment (Functional Deficit)  Goal: Improved Balance and Postural Control  Outcome: Ongoing, Progressing     Problem: Muscle Strength Impairment (Functional Deficit)  Goal: Improved Muscle Strength  Outcome: Ongoing, Progressing     Problem: Sensory Impairment (Functional Deficit)  Goal: Compensation for Sensory Deficit  Outcome: Ongoing, Progressing

## 2022-05-20 NOTE — ASSESSMENT & PLAN NOTE
Debility has been progressive- legs feels heavy and swollen. No pitting edema. CK marginally elevated  Consult neuro   She seems to be able to ambulate at baseline per her report, but weakness has occurred over the recent weeks  elevated rheumatology numbers will have f.u with rheumatology as well as neuro

## 2022-05-20 NOTE — SUBJECTIVE & OBJECTIVE
Past Medical History:   Diagnosis Date    Cerebral palsy     Hearing impaired person     Hypertension     Osteopenia determined by x-ray     Oswaldo syndrome        Past Surgical History:   Procedure Laterality Date    CHOLECYSTECTOMY      INNER EAR SURGERY      RENAL BIOPSY  07/07/14       Review of patient's allergies indicates:   Allergen Reactions    Ace inhibitors Other (See Comments)    Nabumetone Other (See Comments)    Nsaids (non-steroidal anti-inflammatory drug) Other (See Comments)    Sulfa (sulfonamide antibiotics) Hives    Pcn  [penicillins] Rash       No current facility-administered medications on file prior to encounter.     Current Outpatient Medications on File Prior to Encounter   Medication Sig    amlodipine (NORVASC) 10 MG tablet Take 10 mg by mouth once daily.    carvedilol (COREG) 6.25 MG tablet 12.5 mg.     estrogen, conjugated,-medroxyprogesterone 0.625-2.5mg (PREMPRO) 0.625-2.5 mg per tablet Take 1 tablet by mouth once daily.    famotidine (PEPCID) 20 MG tablet TAKE 1 TABLET PO TWICE A DAY    pantoprazole (PROTONIX) 40 MG tablet TK 1 T PO  D    sertraline (ZOLOFT) 100 MG tablet     traZODone (DESYREL) 50 MG tablet     [DISCONTINUED] losartan (COZAAR) 50 MG tablet losartan 50 mg tablet    alendronate (FOSAMAX) 70 MG tablet Take 1 tablet (70 mg total) by mouth every 7 days.    clonazePAM (KLONOPIN) 1 MG tablet Take 1 tablet (1 mg total) by mouth daily as needed for Anxiety.    ergocalciferol (ERGOCALCIFEROL) 50,000 unit Cap Vitamin D2 50,000 unit capsule     Family History    None       Tobacco Use    Smoking status: Current Some Day Smoker     Packs/day: 0.25     Years: 9.00     Pack years: 2.25    Smokeless tobacco: Never Used   Substance and Sexual Activity    Alcohol use: Yes     Comment: Social    Drug use: Yes     Types: Marijuana    Sexual activity: Yes     Partners: Male     Birth control/protection: Inserts     Comment: Single.     Review of Systems   Constitutional:  Negative for  chills and fever.   HENT:  Negative for congestion, ear pain, postnasal drip, rhinorrhea, sore throat and trouble swallowing.    Eyes:  Negative for redness and itching.   Respiratory:  Positive for cough. Negative for shortness of breath and wheezing.    Cardiovascular:  Negative for chest pain and palpitations.   Gastrointestinal:  Negative for abdominal pain, nausea and vomiting.   Genitourinary:  Negative for dysuria and frequency.   Musculoskeletal:  Positive for myalgias.   Skin:  Negative for rash.   Neurological:  Positive for weakness. Negative for headaches.   Objective:     Vital Signs (Most Recent):  Temp: 96.9 °F (36.1 °C) (05/20/22 1143)  Pulse: 91 (05/20/22 1200)  Resp: 16 (05/20/22 1307)  BP: (!) 131/93 (05/20/22 1143)  SpO2: 99 % (05/20/22 1143)   Vital Signs (24h Range):  Temp:  [96.9 °F (36.1 °C)-98.5 °F (36.9 °C)] 96.9 °F (36.1 °C)  Pulse:  [] 91  Resp:  [16-20] 16  SpO2:  [96 %-99 %] 99 %  BP: (123-152)/() 131/93     Weight: 95.7 kg (211 lb)  Body mass index is 37.38 kg/m².    Physical Exam  Vitals and nursing note reviewed.   Constitutional:       General: She is not in acute distress.     Appearance: She is well-developed. She is obese.   HENT:      Head: Normocephalic and atraumatic.   Eyes:      Conjunctiva/sclera: Conjunctivae normal.      Pupils: Pupils are equal, round, and reactive to light.   Neck:      Thyroid: No thyromegaly.   Cardiovascular:      Rate and Rhythm: Normal rate and regular rhythm.      Heart sounds: Normal heart sounds.   Pulmonary:      Effort: Pulmonary effort is normal. No respiratory distress.      Breath sounds: No wheezing or rhonchi.   Abdominal:      General: Bowel sounds are normal.      Palpations: Abdomen is soft.      Tenderness: There is no abdominal tenderness.   Musculoskeletal:         General: Tenderness (to touch bilateral legs) present. Normal range of motion.      Cervical back: Normal range of motion and neck supple.   Lymphadenopathy:       Cervical: No cervical adenopathy.   Skin:     General: Skin is warm and dry.      Findings: No rash.   Neurological:      Mental Status: She is alert and oriented to person, place, and time.   Psychiatric:         Behavior: Behavior normal.         CRANIAL NERVES     CN III, IV, VI   Pupils are equal, round, and reactive to light.     Significant Labs:   CBC:   Recent Labs   Lab 05/18/22  1401 05/19/22  0619 05/20/22  0634   WBC 5.93 7.36 5.76   HGB 11.3* 9.0* 9.2*   HCT 33.2* 26.7* 27.6*    207 185       CMP:   Recent Labs   Lab 05/18/22  1401 05/18/22  1744 05/19/22  0619 05/20/22  0634   * 137 139 141   K 4.0 4.0 3.0* 3.3*    111* 106 101   CO2 13* 8* 20* 23   GLU 99 85 103 82   BUN 52* 44* 37* 28*   CREATININE 4.2* 3.4* 2.8* 2.3*   CALCIUM 9.4 7.9* 7.7* 8.1*   PROT 7.5  --  5.2*  --    ALBUMIN 3.5  --  2.4*  --    BILITOT 0.3  --  0.2  --    ALKPHOS 80  --  56  --    AST 39  --  37  --    ALT 18  --  15  --    ANIONGAP 17* 18* 13 17*   EGFRNONAA 13* 17* 22* 27*     Phos 3.9  Cardiac Markers:   Recent Labs   Lab 05/18/22  1401   BNP <10       Recent Labs   Lab 05/18/22  1401 05/19/22  1949   *  378* 535*   CPKMB 5.1  --    TROPONINI 0.022  --    MB 1.3  --      Myasthenia gravis panel in process  CRP 37.6  SED RATE 60  Lipase:   Recent Labs   Lab 05/18/22  1401   LIPASE 87*       Magnesium:   Recent Labs   Lab 05/18/22  1401   MG 3.1*       TSH:   Recent Labs   Lab 05/18/22  1401   TSH 4.301*       UDS + benzo and THC   Urine Studies:   Recent Labs   Lab 05/18/22  1518   COLORU Yellow   APPEARANCEUA Clear   PHUR 5.0   SPECGRAV 1.015   PROTEINUA Negative   GLUCUA Negative   KETONESU Trace*   BILIRUBINUA 2+*   OCCULTUA Negative   NITRITE Negative   UROBILINOGEN Negative   LEUKOCYTESUR Negative     UPT negative     Mono negative  Group a strept negative  Flu negative    Significant Imaging:     CT head No CT evidence of an acute intracranial abnormality.     Limitations due to  artifact created by the patient's cochlear implant device.  No significant change when compared with the previous study of June 16, 2013.    CT cervical spine    KUB Nonspecific bowel gas pattern without evidence of focal obstruction.    EKG Normal sinus rhythm  Normal ECG  No previous ECGs available  Confirmed by Cedrick Fink MD ([id number]) on 5/18/2022 6:02:26 PM

## 2022-05-20 NOTE — NURSING
NADYA Arguello and patient's father assisted to floor. Dr. Marley was made aware. CHARITY Carreno, House Supervisor, was also notified.

## 2022-05-20 NOTE — ASSESSMENT & PLAN NOTE
bp 117//83  Hold bp meds for now (losartan and amlodipine - ok for coreg as hr elevated 107)  Sees Dr. Olivas who has her on these meds.  Bp ok 124/81 cont to hold arb

## 2022-05-20 NOTE — ASSESSMENT & PLAN NOTE
Pt reports that she was last seen by dr Forbes and told she had 16% renal function. ON admission GFR was 13. Given 5L NS and repeat GFR now 27. Stop fluids. Called for records -- last note from dr Olivas    Initially thought to have RTA - acidotic on admit and given bicarb.  This was stopped.   yes

## 2022-05-20 NOTE — PT/OT/SLP EVAL
Physical Therapy Evaluation    Patient Name:  Francis Bains   MRN:  [id number]    Recommendations:     Discharge Recommendations:  home health PT, home   Discharge Equipment Recommendations: walker, rolling, wheelchair   Barriers to discharge: Unexplained weakness and pain on both LE and numbness on both hands ; severiity of impairment     Assessment:     Francis Bains is a 32 y.o. female admitted with a medical diagnosis of Dehydration.  She presents with the following impairments/functional limitations:  weakness, impaired balance, decreased safety awareness, pain, impaired sensation, impaired cardiopulmonary response to activity, impaired self care skills, impaired joint extensibility, impaired functional mobilty, decreased upper extremity function, impaired muscle length, gait instability, decreased lower extremity function .  Patient is with chronic deafness but able to read lips.  Patient is c/o numbness on both hands, pain on both legs and generalized weakness.  Patient is not able to lift LE against gravity, both LE grossly graded 2/5 in supine.  Patient required min assist with supine to sit, mod assist with sit <> stand from an elevated bed, tolerated standing for about 2-3 seconds then sits down without warning.  Unable to ambulate due to weakness and safety.  Grandmother reports that patient was ambulatory 3 weeks ago and they want an explanation why she can not stand up and walk now.  Nurse made aware.  Patient reports that she is going home to her parents house in [place] upon discharge form the hospital. Recommend home health P.T. upon discharge  And medical work up to determine cause of weakness and change in functional mobility. .    Rehab Prognosis: Fair; patient would benefit from acute skilled PT services to address these deficits and reach maximum level of function.    Recent Surgery: * No surgery found *      Plan:     During this hospitalization, patient to be seen 5 x/week to address  "the identified rehab impairments via gait training, therapeutic exercises, therapeutic activities and progress toward the following goals:    · Plan of Care Expires:  05/25/22    Subjective     Chief Complaint: "My hands are numb. My legs hurt.  I can't stand."   Patient/Family Comments/goals: Find out what is wrong with her, per grandmother.   Pain/Comfort:  · Pain Rating 1:  (did not quantify)  · Location - Side 1: Bilateral  · Location - Orientation 1: lower  · Location 1: leg  · Pain Addressed 1: Reposition, Distraction, Cessation of Activity, Nurse notified  · Pain Rating Post-Intervention 1:  (did not quantify)  · Pain Rating 2:  (numbness)  · Location - Side 2: Bilateral  · Location 2: hand  · Pain Addressed 2: Cessation of Activity, Distraction, Nurse notified  · Pain Rating Post-Intervention 2:  (numbness on both hands)    Patients cultural, spiritual, Gnosticist conflicts given the current situation: no    Living Environment:  Lives with grandmother but will go home to her parents house in [place] upon discharge   Prior to admission, patients level of function was ambulatory without A.D. 3 weeks ago per grandmother.  Equipment used at home: shower chair.  DME owned (not currently used): none.  Upon discharge, patient will have assistance from family.    Objective:     Communicated with nurse, grand mother and patient  prior to session.  Patient found supine with peripheral IV  upon PT entry to room.    General Precautions: Standard, fall   Orthopedic Precautions:N/A   Braces: N/A  Respiratory Status: Room air    Exams:  · Cognitive Exam:  Patient is oriented to Person and Situation  · Fine Motor Coordination:    · -       Intact  · Gross Motor Coordination:  decrease on both LE due to pain and weakness   · Postural Exam:  Patient presented with the following abnormalities:    · -       Rounded shoulders  · Sensation:    · -       Impaired  light/touch , numbness on both hands and decrease sensation on " "both LE   · Skin Integrity/Edema:      · -       Skin integrity: Visible skin intact  · RLE ROM: decrease done active assistedly due to pain, "it feels like it will break".   · RLE Strength: grossly graded 2/5 on hip and knee, 3/5 on ankle   · LLE ROM: decrease done active assistedly due to pain and weakness   · LLE Strength: grossly graded 2/5 on hip and knee, 3/5 on ankle    Functional Mobility:  · Bed Mobility:     · Rolling Left:  minimum assistance  · Rolling Right: minimum assistance  · Scooting: minimum assistance  · Bridging: moderate assistance and of 2 persons  · Supine to Sit: minimum assistance  · Sit to Supine: moderate assistance  · Transfers:     · Sit to Stand:  moderate assistance  from an elevated bed  with rolling walker  · Gait: unable   · Balance: SBA with static sitting, mod assist with static standing with RW    Therapeutic Activities and Exercises:   P.T. initial evaluation, supine <> sit, sitting at edge of bed, sit <> stand, standing with a RW and positioning in bed     AM-PAC 6 CLICK MOBILITY  Total Score:10     Patient left HOB elevated with all lines intact, call button in reach, nurse notified and grandmother present  present.    GOALS:   Multidisciplinary Problems     Physical Therapy Goals        Problem: Physical Therapy    Goal Priority Disciplines Outcome Goal Variances Interventions   Physical Therapy Goal     PT, PT/OT Ongoing, Progressing     Description: Goals to be met by: 2022    Patient will increase functional independence with mobility by performin. Supine to sit with Standby Assistance.  2. Sit to supine with Standby Assistance.  3. Bed to chair transfer with Minimal Assistance with rolling walker using Step Transfer technique.  4. Sit to Stand with Minimal Assistance with rolling walker.  5. Gait  x 10  feet with Minimal Assistance with rolling walker.  6. Lower extremity exercise program x10 reps, with assistance                     History:     Past Medical " History:   Diagnosis Date    Cerebral palsy     Hearing impaired person     Hypertension     Osteopenia determined by x-ray     Oswaldo syndrome        Past Surgical History:   Procedure Laterality Date    CHOLECYSTECTOMY      INNER EAR SURGERY      RENAL BIOPSY  07/07/14       Time Tracking:     PT Received On: 05/20/22  PT Start Time: 1149     PT Stop Time: 1223  PT Total Time (min): 34 min     Billable Minutes: Evaluation moderate complexity       05/20/2022

## 2022-05-20 NOTE — PROGRESS NOTES
[facility] Surg ([facility])  [facility] 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 She is here with a female relative. She has been coughing- mostly in am for last couple months.  She has been coughing so much she will throw up mucus. She takes an allergy pill  from PCP and that helped. She also reports that she has leg swelling. Mother reports that she hs been weak and not ambulating like usual. Only using rollator to bathroom and back. Mother reports that her appetite has been reduced. She was seen a couple times in [facility] ER for same complaints but was sent home- dx with anxiety. When she was worked up in ER and noted to be significantly dehydrated. She was given 5L NS and creat 4.2>>2.8 this am. She reports that she sees Dr Olivas and her last GFR was 16%. Now she has a dry cough and continues to c/o leg pain.      Overview/Hospital Course:  Pt was admitted yesterday with c.o leg weakness and needed to have neuro w/u. Ct head and c spine done this am without acute abnormality. PT consulted. She was in radiology when he rounded this am. Will have him see her this afternoon. Elevated rheumatology markers. Neuro rec f/u with her as well as rheumatology. Renal function is better than baseline       Past Medical History:   Diagnosis Date    Cerebral palsy     Hearing impaired person     Hypertension     Osteopenia determined by x-ray     Oswaldo syndrome        Past Surgical History:   Procedure Laterality Date    CHOLECYSTECTOMY      INNER EAR SURGERY      RENAL BIOPSY   07/07/14       Review of patient's allergies indicates:   Allergen Reactions    Ace inhibitors Other (See Comments)    Nabumetone Other (See Comments)    Nsaids (non-steroidal anti-inflammatory drug) Other (See Comments)    Sulfa (sulfonamide antibiotics) Hives    Pcn  [penicillins] Rash       No current facility-administered medications on file prior to encounter.     Current Outpatient Medications on File Prior to Encounter   Medication Sig    amlodipine (NORVASC) 10 MG tablet Take 10 mg by mouth once daily.    carvedilol (COREG) 6.25 MG tablet 12.5 mg.     estrogen, conjugated,-medroxyprogesterone 0.625-2.5mg (PREMPRO) 0.625-2.5 mg per tablet Take 1 tablet by mouth once daily.    famotidine (PEPCID) 20 MG tablet TAKE 1 TABLET PO TWICE A DAY    pantoprazole (PROTONIX) 40 MG tablet TK 1 T PO  D    sertraline (ZOLOFT) 100 MG tablet     traZODone (DESYREL) 50 MG tablet     [DISCONTINUED] losartan (COZAAR) 50 MG tablet losartan 50 mg tablet    alendronate (FOSAMAX) 70 MG tablet Take 1 tablet (70 mg total) by mouth every 7 days.    clonazePAM (KLONOPIN) 1 MG tablet Take 1 tablet (1 mg total) by mouth daily as needed for Anxiety.    ergocalciferol (ERGOCALCIFEROL) 50,000 unit Cap Vitamin D2 50,000 unit capsule     Family History    None       Tobacco Use    Smoking status: Current Some Day Smoker     Packs/day: 0.25     Years: 9.00     Pack years: 2.25    Smokeless tobacco: Never Used   Substance and Sexual Activity    Alcohol use: Yes     Comment: Social    Drug use: Yes     Types: Marijuana    Sexual activity: Yes     Partners: Male     Birth control/protection: Inserts     Comment: Single.     Review of Systems   Constitutional:  Negative for chills and fever.   HENT:  Negative for congestion, ear pain, postnasal drip, rhinorrhea, sore throat and trouble swallowing.    Eyes:  Negative for redness and itching.   Respiratory:  Positive for cough. Negative for shortness of breath and wheezing.     Cardiovascular:  Negative for chest pain and palpitations.   Gastrointestinal:  Negative for abdominal pain, nausea and vomiting.   Genitourinary:  Negative for dysuria and frequency.   Musculoskeletal:  Positive for myalgias.   Skin:  Negative for rash.   Neurological:  Positive for weakness. Negative for headaches.   Objective:     Vital Signs (Most Recent):  Temp: 96.9 °F (36.1 °C) (05/20/22 1143)  Pulse: 91 (05/20/22 1200)  Resp: 16 (05/20/22 1307)  BP: (!) 131/93 (05/20/22 1143)  SpO2: 99 % (05/20/22 1143)   Vital Signs (24h Range):  Temp:  [96.9 °F (36.1 °C)-98.5 °F (36.9 °C)] 96.9 °F (36.1 °C)  Pulse:  [] 91  Resp:  [16-20] 16  SpO2:  [96 %-99 %] 99 %  BP: (123-152)/() 131/93     Weight: 95.7 kg (211 lb)  Body mass index is 37.38 kg/m².    Physical Exam  Vitals and nursing note reviewed.   Constitutional:       General: She is not in acute distress.     Appearance: She is well-developed. She is obese.   HENT:      Head: Normocephalic and atraumatic.   Eyes:      Conjunctiva/sclera: Conjunctivae normal.      Pupils: Pupils are equal, round, and reactive to light.   Neck:      Thyroid: No thyromegaly.   Cardiovascular:      Rate and Rhythm: Normal rate and regular rhythm.      Heart sounds: Normal heart sounds.   Pulmonary:      Effort: Pulmonary effort is normal. No respiratory distress.      Breath sounds: No wheezing or rhonchi.   Abdominal:      General: Bowel sounds are normal.      Palpations: Abdomen is soft.      Tenderness: There is no abdominal tenderness.   Musculoskeletal:         General: Tenderness (to touch bilateral legs) present. Normal range of motion.      Cervical back: Normal range of motion and neck supple.   Lymphadenopathy:      Cervical: No cervical adenopathy.   Skin:     General: Skin is warm and dry.      Findings: No rash.   Neurological:      Mental Status: She is alert and oriented to person, place, and time.   Psychiatric:         Behavior: Behavior normal.          CRANIAL NERVES     CN III, IV, VI   Pupils are equal, round, and reactive to light.     Significant Labs:   CBC:   Recent Labs   Lab 05/18/22  1401 05/19/22  0619 05/20/22  0634   WBC 5.93 7.36 5.76   HGB 11.3* 9.0* 9.2*   HCT 33.2* 26.7* 27.6*    207 185       CMP:   Recent Labs   Lab 05/18/22  1401 05/18/22  1744 05/19/22  0619 05/20/22  0634   * 137 139 141   K 4.0 4.0 3.0* 3.3*    111* 106 101   CO2 13* 8* 20* 23   GLU 99 85 103 82   BUN 52* 44* 37* 28*   CREATININE 4.2* 3.4* 2.8* 2.3*   CALCIUM 9.4 7.9* 7.7* 8.1*   PROT 7.5  --  5.2*  --    ALBUMIN 3.5  --  2.4*  --    BILITOT 0.3  --  0.2  --    ALKPHOS 80  --  56  --    AST 39  --  37  --    ALT 18  --  15  --    ANIONGAP 17* 18* 13 17*   EGFRNONAA 13* 17* 22* 27*     Phos 3.9  Cardiac Markers:   Recent Labs   Lab 05/18/22  1401   BNP <10       Recent Labs   Lab 05/18/22  1401 05/19/22  1949   *  378* 535*   CPKMB 5.1  --    TROPONINI 0.022  --    MB 1.3  --      Myasthenia gravis panel in process  CRP 37.6  SED RATE 60  Lipase:   Recent Labs   Lab 05/18/22  1401   LIPASE 87*       Magnesium:   Recent Labs   Lab 05/18/22  1401   MG 3.1*       TSH:   Recent Labs   Lab 05/18/22  1401   TSH 4.301*       UDS + benzo and THC   Urine Studies:   Recent Labs   Lab 05/18/22  1518   COLORU Yellow   APPEARANCEUA Clear   PHUR 5.0   SPECGRAV 1.015   PROTEINUA Negative   GLUCUA Negative   KETONESU Trace*   BILIRUBINUA 2+*   OCCULTUA Negative   NITRITE Negative   UROBILINOGEN Negative   LEUKOCYTESUR Negative     UPT negative     Mono negative  Group a strept negative  Flu negative    Significant Imaging:     CT head No CT evidence of an acute intracranial abnormality.     Limitations due to artifact created by the patient's cochlear implant device.  No significant change when compared with the previous study of June 16, 2013.    CT cervical spine    KUB Nonspecific bowel gas pattern without evidence of focal obstruction.    EKG Normal  sinus rhythm  Normal ECG  No previous ECGs available  Confirmed by Cedrick Fink MD ([id number]) on 5/18/2022 6:02:26 PM      Assessment/Plan:      * Dehydration  Pt reports that she was last seen by dr Forbes and told she had 16% renal function. ON admission GFR was 13. Given 5L NS and repeat GFR now 27. Stop fluids. Called for records -- last note from dr Olivas    Initially thought to have RTA - acidotic on admit and given bicarb.  This was stopped.    Hypokalemia  Replace po      CKD (chronic kidney disease) stage 4, GFR 15-29 ml/min  Pt reports that she was last seen by dr Forbes and told she had 16% renal function. ON admission GFR was 13. Given 5L NS and repeat GFR now 27. Stop fluids. Called for records -- last note from dr Olivas      DNETON (generalized anxiety disorder)  Resume zoloft   Klonopin as needed    HTN (hypertension)  bp 117//83  Hold bp meds for now (losartan and amlodipine - ok for coreg as hr elevated 107)  Sees Dr. Olivas who has her on these meds.  Bp ok 124/81 cont to hold arb    Major depression  Cont zoloft      Infantile cerebral palsy, unspecified  Debility has been progressive- legs feels heavy and swollen. No pitting edema. CK marginally elevated  Consult neuro   She seems to be able to ambulate at baseline per her report, but weakness has occurred over the recent weeks  elevated rheumatology numbers will have f.u with rheumatology as well as neuro       VTE Risk Mitigation (From admission, onward)         Ordered     IP VTE HIGH RISK PATIENT  Once         05/19/22 0227     Place sequential compression device  Until discontinued         05/19/22 0227                Discharge Planning   MARY KATE: 5/20/2022     Code Status: Full Code   Is the patient medically ready for discharge?:     Reason for patient still in hospital (select all that apply): Pending disposition  Discharge Plan A: Home with family                  Niya Marley MD  Department of Hospital Medicine   [facility] - Med Surg  (3rd Fl)

## 2022-05-20 NOTE — PT/OT/SLP PROGRESS
Physical Therapy      Patient Name:  Francis Bains   MRN:  [id number]    Patient not seen today secondary to Testing/imaging (xray/CT/MRI). Will follow-up when appropriate.

## 2022-05-20 NOTE — ASSESSMENT & PLAN NOTE
Pt reports that she was last seen by dr Forbes and told she had 16% renal function. ON admission GFR was 13. Given 5L NS and repeat GFR now 27. Stop fluids. Called for records -- last note from dr Olivas    Initially thought to have RTA - acidotic on admit and given bicarb.  This was stopped.

## 2022-05-20 NOTE — PLAN OF CARE
[facility] - Med Surg (3rd Fl)  Discharge Final Note    Primary Care Provider: Radha Vazquez NP    Expected Discharge Date: 5/20/2022    Final Discharge Note (most recent)     Final Note - 05/20/22 1406        Final Note    Assessment Type Final Discharge Note     Anticipated Discharge Disposition Home or Self Care     What phone number can be called within the next 1-3 days to see how you are doing after discharge? [phone]     Hospital Resources/Appts/Education Provided Appointments scheduled and added to AVS        Post-Acute Status    Post-Acute Authorization Other     Other Status No Post-Acute Service Needs     Discharge Delays None known at this time                 Important Message from Medicare             Contact Info     Wilmar Mary MD   Specialty: Neurology    [address]  [place] [address]   Phone: [phone]       Next Steps: Follow up in 3 week(s)    Eric Aquino MD   Specialty: Family Medicine    [address]  [facility]  [place] [address]   Phone: [phone]       Next Steps: Go on 5/23/2022    Instructions: Hospital Follow-up at 8am

## 2022-05-20 NOTE — SUBJECTIVE & OBJECTIVE
Past Medical History:   Diagnosis Date    Cerebral palsy     Hearing impaired person     Hypertension     Osteopenia determined by x-ray     Oswaldo syndrome        Past Surgical History:   Procedure Laterality Date    CHOLECYSTECTOMY      INNER EAR SURGERY      RENAL BIOPSY  07/07/14       Review of patient's allergies indicates:   Allergen Reactions    Ace inhibitors Other (See Comments)    Nabumetone Other (See Comments)    Nsaids (non-steroidal anti-inflammatory drug) Other (See Comments)    Sulfa (sulfonamide antibiotics) Hives    Pcn  [penicillins] Rash       No current facility-administered medications on file prior to encounter.     Current Outpatient Medications on File Prior to Encounter   Medication Sig    amlodipine (NORVASC) 10 MG tablet Take 10 mg by mouth once daily.    carvedilol (COREG) 6.25 MG tablet 12.5 mg.     estrogen, conjugated,-medroxyprogesterone 0.625-2.5mg (PREMPRO) 0.625-2.5 mg per tablet Take 1 tablet by mouth once daily.    famotidine (PEPCID) 20 MG tablet TAKE 1 TABLET PO TWICE A DAY    losartan (COZAAR) 50 MG tablet losartan 50 mg tablet    pantoprazole (PROTONIX) 40 MG tablet TK 1 T PO  D    sertraline (ZOLOFT) 100 MG tablet     traZODone (DESYREL) 50 MG tablet     alendronate (FOSAMAX) 70 MG tablet Take 1 tablet (70 mg total) by mouth every 7 days.    clonazePAM (KLONOPIN) 1 MG tablet Take 1 tablet (1 mg total) by mouth daily as needed for Anxiety.    ergocalciferol (ERGOCALCIFEROL) 50,000 unit Cap Vitamin D2 50,000 unit capsule     Family History    None       Tobacco Use    Smoking status: Current Some Day Smoker     Packs/day: 0.25     Years: 9.00     Pack years: 2.25    Smokeless tobacco: Never Used   Substance and Sexual Activity    Alcohol use: Yes     Comment: Social    Drug use: Yes     Types: Marijuana    Sexual activity: Yes     Partners: Male     Birth control/protection: Inserts     Comment: Single.     Review of Systems   Constitutional:  Negative for chills and  fever.   HENT:  Positive for congestion. Negative for ear pain, postnasal drip, rhinorrhea, sore throat and trouble swallowing.    Eyes:  Negative for redness and itching.   Respiratory:  Positive for cough. Negative for shortness of breath and wheezing.    Cardiovascular:  Negative for chest pain and palpitations.   Gastrointestinal:  Negative for abdominal pain, diarrhea, nausea and vomiting.   Genitourinary:  Negative for dysuria and frequency.   Musculoskeletal:  Positive for myalgias.   Skin:  Negative for rash.   Neurological:  Positive for weakness. Negative for headaches.   Objective:     Vital Signs (Most Recent):  Temp: 97.1 °F (36.2 °C) (05/20/22 0743)  Pulse: 108 (05/20/22 1000)  Resp: 16 (05/20/22 0743)  BP: 124/81 (05/20/22 0743)  SpO2: 97 % (05/20/22 0743)   Vital Signs (24h Range):  Temp:  [97.1 °F (36.2 °C)-98.5 °F (36.9 °C)] 97.1 °F (36.2 °C)  Pulse:  [] 108  Resp:  [16-20] 16  SpO2:  [96 %-98 %] 97 %  BP: (123-152)/() 124/81     Weight: 95.7 kg (211 lb)  Body mass index is 37.38 kg/m².    Physical Exam  Vitals and nursing note reviewed.   Constitutional:       General: She is not in acute distress.     Appearance: She is well-developed. She is obese.   HENT:      Head: Normocephalic and atraumatic.   Eyes:      Conjunctiva/sclera: Conjunctivae normal.      Pupils: Pupils are equal, round, and reactive to light.   Neck:      Thyroid: No thyromegaly.   Cardiovascular:      Rate and Rhythm: Normal rate and regular rhythm.      Heart sounds: Normal heart sounds.   Pulmonary:      Effort: Pulmonary effort is normal. No respiratory distress.      Breath sounds: Rhonchi present. No wheezing.   Abdominal:      General: Bowel sounds are normal.      Palpations: Abdomen is soft.      Tenderness: There is no abdominal tenderness.   Musculoskeletal:         General: Tenderness (to touch bilateral legs) present. Normal range of motion.      Cervical back: Normal range of motion and neck supple.    Lymphadenopathy:      Cervical: No cervical adenopathy.   Skin:     General: Skin is warm and dry.      Findings: No rash.   Neurological:      Mental Status: She is alert and oriented to person, place, and time.   Psychiatric:         Behavior: Behavior normal.         CRANIAL NERVES     CN III, IV, VI   Pupils are equal, round, and reactive to light.     Significant Labs:   CBC:   Recent Labs   Lab 05/18/22  1401 05/19/22  0619 05/20/22  0634   WBC 5.93 7.36 5.76   HGB 11.3* 9.0* 9.2*   HCT 33.2* 26.7* 27.6*    207 185       CMP:   Recent Labs   Lab 05/18/22  1401 05/18/22  1744 05/19/22  0619 05/20/22  0634   * 137 139 141   K 4.0 4.0 3.0* 3.3*    111* 106 101   CO2 13* 8* 20* 23   GLU 99 85 103 82   BUN 52* 44* 37* 28*   CREATININE 4.2* 3.4* 2.8* 2.3*   CALCIUM 9.4 7.9* 7.7* 8.1*   PROT 7.5  --  5.2*  --    ALBUMIN 3.5  --  2.4*  --    BILITOT 0.3  --  0.2  --    ALKPHOS 80  --  56  --    AST 39  --  37  --    ALT 18  --  15  --    ANIONGAP 17* 18* 13 17*   EGFRNONAA 13* 17* 22* 27*     Phos 3.9  Cardiac Markers:   Recent Labs   Lab 05/18/22  1401   BNP <10       Recent Labs   Lab 05/18/22  1401 05/19/22  1949   *  378* 535*   CPKMB 5.1  --    TROPONINI 0.022  --    MB 1.3  --      Myasthenia gravis panel in process  CRP 37.6  SED RATE 60  Lipase:   Recent Labs   Lab 05/18/22  1401   LIPASE 87*       Magnesium:   Recent Labs   Lab 05/18/22  1401   MG 3.1*       TSH:   Recent Labs   Lab 05/18/22  1401   TSH 4.301*       UDS + benzo and THC   Urine Studies:   Recent Labs   Lab 05/18/22  1518   COLORU Yellow   APPEARANCEUA Clear   PHUR 5.0   SPECGRAV 1.015   PROTEINUA Negative   GLUCUA Negative   KETONESU Trace*   BILIRUBINUA 2+*   OCCULTUA Negative   NITRITE Negative   UROBILINOGEN Negative   LEUKOCYTESUR Negative     UPT negative     Mono negative  Group a strept negative  Flu negative    Significant Imaging:     CT head No CT evidence of an acute intracranial abnormality.      Limitations due to artifact created by the patient's cochlear implant device.  No significant change when compared with the previous study of June 16, 2013.    CT cervical spine    KUB Nonspecific bowel gas pattern without evidence of focal obstruction.    EKG Normal sinus rhythm  Normal ECG  No previous ECGs available  Confirmed by Cedrick Fink MD ([id number]) on 5/18/2022 6:02:26 PM

## 2022-05-20 NOTE — PLAN OF CARE
[facility] - Med Surg (3rd Fl)  Initial Discharge Assessment       Primary Care Provider: Tereza Magaña MD    Admission Diagnosis: Fatigue [R53.83]  JESSICA (acute kidney injury) [N17.9]    Admission Date: 5/18/2022  Expected Discharge Date:     Discharge Barriers Identified: None    Payor: MEDICAID / Plan: [facility] COMMUNITY PLAN [facility] ([place] MEDICAID) / Product Type: Managed Medicaid /     Extended Emergency Contact Information  Primary Emergency Contact: Lisa Roque  Address: [address] [address] [place]  Mobile Phone: [phone]  Relation: Grandparent  Secondary Emergency Contact: Saroj Roquei   [place]  Mobile Phone: [phone]  Relation: Relative    Discharge Plan A: Home with family  Discharge Plan B: Home with family      [facility] [place], [place] - [address] [facility]  [address] [facility] [address]  Phone: [phone] Fax: [phone]    [address] - [address] [facility] [address]  [address] [facility] [address]  [facility] [address]  Phone: [phone] Fax: [phone]      Initial Assessment (most recent)     Adult Discharge Assessment - 05/20/22 1207        Discharge Assessment    Assessment Type Discharge Planning Assessment     Confirmed/corrected address, phone number and insurance Yes     Confirmed Demographics Correct on Facesheet     Source of Information patient;family     Communicated MARY KATE with patient/caregiver Yes     Reason For Admission Dehydration     Lives With grandparent(s)     Facility Arrived From: Home     Do you expect to return to your current living situation? Yes     Do you have help at home or someone to help you manage your care at home? Yes     Who are your caregiver(s) and their phone number(s)? Lisa Roque (Grandmother) [phone]     Prior to hospitilization cognitive status: Alert/Oriented     Current cognitive status: Alert/Oriented     Equipment Currently Used at Home shower  chair     Readmission within 30 days? No     Patient currently being followed by outpatient case management? No     Do you currently have service(s) that help you manage your care at home? No     Do you take prescription medications? Yes     Do you have prescription coverage? Yes     Coverage [facility] Medicaid     Do you have any problems affording any of your prescribed medications? No     Is the patient taking medications as prescribed? no     How do you get to doctors appointments? family or friend will provide     Are you on dialysis? No     Do you take coumadin? No     Discharge Plan A Home with family     Discharge Plan B Home with family     DME Needed Upon Discharge  none     Discharge Plan discussed with: Patient;Caregiver     Discharge Barriers Identified None                        Discharge assessment completed with patient and grandmother. Denies any post-acute care needs at this time. SW to remain available.

## 2022-05-23 ENCOUNTER — TELEPHONE (OUTPATIENT)
Dept: PULMONOLOGY | Facility: CLINIC | Age: 32
End: 2022-05-23
Payer: MEDICAID

## 2022-05-23 NOTE — TELEPHONE ENCOUNTER
----- Message from Nicole Santacruz sent at 5/23/2022 12:18 PM CDT -----  Type:  Sooner Apoointment Request    Caller is requesting a sooner appointment.  Caller declined first available appointment listed below.  Caller will not accept being placed on the waitlist and is requesting a message be sent to doctor.  Name of Caller:Deonte/Mom  When is the first available appointment?na  Symptoms:Lung issues from Covid  Would the patient rather a call back or a response via [facility]?call back  Best Call Back Number [phone]  Additional Information: na

## 2022-05-26 LAB
ACHR BIND AB SER-SCNC: 0 NMOL/L
MUSK ANTIBODY TEST: 0 NMOL/L (ref 0–0.02)
VGCC-N BIND AB SER-SCNC: NORMAL PMOL/L
VGCC-P/Q BIND AB SER-SCNC: 0 NMOL/L

## 2022-05-27 ENCOUNTER — OFFICE VISIT (OUTPATIENT)
Dept: PRIMARY CARE CLINIC | Facility: CLINIC | Age: 32
End: 2022-05-27
Payer: MEDICAID

## 2022-05-27 VITALS
TEMPERATURE: 97 F | DIASTOLIC BLOOD PRESSURE: 100 MMHG | OXYGEN SATURATION: 97 % | SYSTOLIC BLOOD PRESSURE: 136 MMHG | HEART RATE: 84 BPM | RESPIRATION RATE: 20 BRPM

## 2022-05-27 DIAGNOSIS — R53.83 FATIGUE, UNSPECIFIED TYPE: ICD-10-CM

## 2022-05-27 DIAGNOSIS — N30.01 ACUTE CYSTITIS WITH HEMATURIA: ICD-10-CM

## 2022-05-27 DIAGNOSIS — D50.9 IRON DEFICIENCY ANEMIA, UNSPECIFIED IRON DEFICIENCY ANEMIA TYPE: ICD-10-CM

## 2022-05-27 DIAGNOSIS — N18.4 CKD (CHRONIC KIDNEY DISEASE) STAGE 4, GFR 15-29 ML/MIN: ICD-10-CM

## 2022-05-27 DIAGNOSIS — R11.0 NAUSEA: ICD-10-CM

## 2022-05-27 DIAGNOSIS — G80.9 INFANTILE CEREBRAL PALSY: ICD-10-CM

## 2022-05-27 DIAGNOSIS — Z09 HOSPITAL DISCHARGE FOLLOW-UP: Primary | ICD-10-CM

## 2022-05-27 PROCEDURE — 3080F DIAST BP >= 90 MM HG: CPT | Mod: CPTII,,, | Performed by: NURSE PRACTITIONER

## 2022-05-27 PROCEDURE — 1160F PR REVIEW ALL MEDS BY PRESCRIBER/CLIN PHARMACIST DOCUMENTED: ICD-10-PCS | Mod: CPTII,,, | Performed by: NURSE PRACTITIONER

## 2022-05-27 PROCEDURE — 4010F PR ACE/ARB THEARPY RXD/TAKEN: ICD-10-PCS | Mod: CPTII,,, | Performed by: NURSE PRACTITIONER

## 2022-05-27 PROCEDURE — 1160F RVW MEDS BY RX/DR IN RCRD: CPT | Mod: CPTII,,, | Performed by: NURSE PRACTITIONER

## 2022-05-27 PROCEDURE — 1159F PR MEDICATION LIST DOCUMENTED IN MEDICAL RECORD: ICD-10-PCS | Mod: CPTII,,, | Performed by: NURSE PRACTITIONER

## 2022-05-27 PROCEDURE — 99214 PR OFFICE/OUTPT VISIT, EST, LEVL IV, 30-39 MIN: ICD-10-PCS | Mod: S$PBB,,, | Performed by: NURSE PRACTITIONER

## 2022-05-27 PROCEDURE — 1159F MED LIST DOCD IN RCRD: CPT | Mod: CPTII,,, | Performed by: NURSE PRACTITIONER

## 2022-05-27 PROCEDURE — 3075F PR MOST RECENT SYSTOLIC BLOOD PRESS GE 130-139MM HG: ICD-10-PCS | Mod: CPTII,,, | Performed by: NURSE PRACTITIONER

## 2022-05-27 PROCEDURE — 99214 OFFICE O/P EST MOD 30 MIN: CPT | Mod: S$PBB,,, | Performed by: NURSE PRACTITIONER

## 2022-05-27 PROCEDURE — 3075F SYST BP GE 130 - 139MM HG: CPT | Mod: CPTII,,, | Performed by: NURSE PRACTITIONER

## 2022-05-27 PROCEDURE — 4010F ACE/ARB THERAPY RXD/TAKEN: CPT | Mod: CPTII,,, | Performed by: NURSE PRACTITIONER

## 2022-05-27 PROCEDURE — 3080F PR MOST RECENT DIASTOLIC BLOOD PRESSURE >= 90 MM HG: ICD-10-PCS | Mod: CPTII,,, | Performed by: NURSE PRACTITIONER

## 2022-05-27 RX ORDER — ONDANSETRON 4 MG/1
4 TABLET, ORALLY DISINTEGRATING ORAL EVERY 8 HOURS PRN
Qty: 20 TABLET | Refills: 0 | Status: SHIPPED | OUTPATIENT
Start: 2022-05-27

## 2022-05-27 RX ORDER — CEPHALEXIN 250 MG/1
250 CAPSULE ORAL EVERY 12 HOURS
Qty: 14 CAPSULE | Refills: 0 | Status: SHIPPED | OUTPATIENT
Start: 2022-05-27 | End: 2022-06-03

## 2022-05-27 NOTE — PATIENT INSTRUCTIONS
Follow up with neurology.  Take over the counter iron supplements as directed on label. Take it with orange. If you find it makes the nausea worse take it at night.  Increase water intake.  Take antibiotics as prescribed.  Report to the ER for worsening of any symptoms.
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
## 2022-05-30 ENCOUNTER — TELEPHONE (OUTPATIENT)
Dept: NEUROLOGY | Facility: CLINIC | Age: 32
End: 2022-05-30
Payer: MEDICAID

## 2022-05-30 NOTE — TELEPHONE ENCOUNTER
Patient was supposed to have hospital follow up with me post d/c. Please arrange for the next 3 weeks.   Also she was supposed to see rheumatology for elevated labs. Please inquire/ does she need a referral/ have appointment pending?

## 2022-06-01 ENCOUNTER — TELEPHONE (OUTPATIENT)
Dept: PRIMARY CARE CLINIC | Facility: CLINIC | Age: 32
End: 2022-06-01
Payer: MEDICAID

## 2022-06-01 DIAGNOSIS — G80.9 INFANTILE CEREBRAL PALSY: Primary | ICD-10-CM

## 2022-06-01 DIAGNOSIS — G80.8 OTHER CEREBRAL PALSY: ICD-10-CM

## 2022-06-01 NOTE — TELEPHONE ENCOUNTER
Placed call to mother of patient regarding referral for home health verified the correct number and fax

## 2022-06-01 NOTE — TELEPHONE ENCOUNTER
Spoke with pt Mother requesting home health around the clock assistance with pt due to difficulty not being able to care for pt without help; advice pt Mother to choose a home health agency and fax over form to the clinic office for Dr. RENEE Garnica to complete form; Mother verbalized understanding

## 2022-06-01 NOTE — TELEPHONE ENCOUNTER
Spoke with father of patient, he stated she is bed bound unable to walk and can barely move arms,she started eating better. She had appointment with NP on 05/27 and has appointment scheduled with PCP for 06/06@1:00, family is not sure she can manage in car for [place] appointment.

## 2022-06-02 ENCOUNTER — TELEPHONE (OUTPATIENT)
Dept: PRIMARY CARE CLINIC | Facility: CLINIC | Age: 32
End: 2022-06-02
Payer: MEDICAID

## 2022-06-02 NOTE — TELEPHONE ENCOUNTER
Returned call,  informed orders for home health has been faxed. Advised appointment scheduled for Monday. If patient has any worsening symptoms before scheduled appointment advised to seek emergency medical treatment.

## 2022-06-02 NOTE — TELEPHONE ENCOUNTER
----- Message from Anette Santizo sent at 6/2/2022  3:53 PM CDT -----  Pt mom Nette called in asking did the nurse from yesterday give the message to Dr. Garnica first thing this morning Francis is very sick,  and mom would like a response ASAP  please call mom back at .[phone]      Thanks bs

## 2022-06-06 ENCOUNTER — HOSPITAL ENCOUNTER (INPATIENT)
Facility: HOSPITAL | Age: 32
LOS: 4 days | Discharge: HOME | DRG: 683 | End: 2022-06-10
Attending: EMERGENCY MEDICINE | Admitting: INTERNAL MEDICINE
Payer: MEDICAID

## 2022-06-06 ENCOUNTER — OFFICE VISIT (OUTPATIENT)
Dept: PRIMARY CARE CLINIC | Facility: CLINIC | Age: 32
DRG: 683 | End: 2022-06-06
Payer: MEDICAID

## 2022-06-06 ENCOUNTER — TELEPHONE (OUTPATIENT)
Dept: PEDIATRICS | Facility: CLINIC | Age: 32
End: 2022-06-06
Payer: MEDICAID

## 2022-06-06 VITALS
HEART RATE: 114 BPM | RESPIRATION RATE: 20 BRPM | OXYGEN SATURATION: 98 % | TEMPERATURE: 98 F | SYSTOLIC BLOOD PRESSURE: 110 MMHG | DIASTOLIC BLOOD PRESSURE: 64 MMHG

## 2022-06-06 DIAGNOSIS — N18.4 ACUTE RENAL FAILURE SUPERIMPOSED ON STAGE 4 CHRONIC KIDNEY DISEASE, UNSPECIFIED ACUTE RENAL FAILURE TYPE: ICD-10-CM

## 2022-06-06 DIAGNOSIS — E86.0 DEHYDRATION: ICD-10-CM

## 2022-06-06 DIAGNOSIS — R11.2 NAUSEA AND VOMITING, INTRACTABILITY OF VOMITING NOT SPECIFIED, UNSPECIFIED VOMITING TYPE: Primary | ICD-10-CM

## 2022-06-06 DIAGNOSIS — K21.9 GASTROESOPHAGEAL REFLUX DISEASE WITHOUT ESOPHAGITIS: ICD-10-CM

## 2022-06-06 DIAGNOSIS — N17.9 ACUTE RENAL FAILURE SUPERIMPOSED ON STAGE 4 CHRONIC KIDNEY DISEASE, UNSPECIFIED ACUTE RENAL FAILURE TYPE: ICD-10-CM

## 2022-06-06 DIAGNOSIS — N18.4 ANEMIA DUE TO STAGE 4 CHRONIC KIDNEY DISEASE: ICD-10-CM

## 2022-06-06 DIAGNOSIS — R41.89 DECREASED LEVEL OF CONSCIOUSNESS: Primary | ICD-10-CM

## 2022-06-06 DIAGNOSIS — D63.1 ANEMIA DUE TO STAGE 4 CHRONIC KIDNEY DISEASE: ICD-10-CM

## 2022-06-06 DIAGNOSIS — G80.1 SPASTIC DIPLEGIC CEREBRAL PALSY: ICD-10-CM

## 2022-06-06 DIAGNOSIS — K92.1 GASTROINTESTINAL HEMORRHAGE WITH MELENA: ICD-10-CM

## 2022-06-06 DIAGNOSIS — R07.9 CHEST PAIN: ICD-10-CM

## 2022-06-06 DIAGNOSIS — K92.2 GASTROINTESTINAL HEMORRHAGE, UNSPECIFIED GASTROINTESTINAL HEMORRHAGE TYPE: ICD-10-CM

## 2022-06-06 PROBLEM — K31.84 GASTROPARESIS: Status: ACTIVE | Noted: 2022-06-06

## 2022-06-06 PROBLEM — R53.81 DEBILITY: Status: ACTIVE | Noted: 2022-06-06

## 2022-06-06 LAB
ABO + RH BLD: NORMAL
ALBUMIN SERPL BCP-MCNC: 3.6 G/DL (ref 3.5–5.2)
ALP SERPL-CCNC: 79 U/L (ref 55–135)
ALT SERPL W/O P-5'-P-CCNC: 31 U/L (ref 10–44)
AMPHET+METHAMPHET UR QL: NEGATIVE
ANION GAP SERPL CALC-SCNC: 24 MMOL/L (ref 8–16)
AST SERPL-CCNC: 59 U/L (ref 10–40)
BARBITURATES UR QL SCN>200 NG/ML: NEGATIVE
BASOPHILS # BLD AUTO: 0.02 K/UL (ref 0–0.2)
BASOPHILS NFR BLD: 0.2 % (ref 0–1.9)
BENZODIAZ UR QL SCN>200 NG/ML: ABNORMAL
BILIRUB SERPL-MCNC: 0.4 MG/DL (ref 0.1–1)
BILIRUB UR QL STRIP: ABNORMAL
BLD GP AB SCN CELLS X3 SERPL QL: NORMAL
BNP SERPL-MCNC: <10 PG/ML (ref 0–99)
BUN SERPL-MCNC: 88 MG/DL (ref 6–20)
BZE UR QL SCN: NEGATIVE
CALCIUM SERPL-MCNC: 10 MG/DL (ref 8.7–10.5)
CANNABINOIDS UR QL SCN: ABNORMAL
CHLORIDE SERPL-SCNC: 93 MMOL/L (ref 95–110)
CLARITY UR: CLEAR
CO2 SERPL-SCNC: 15 MMOL/L (ref 23–29)
COLOR UR: YELLOW
CREAT SERPL-MCNC: 4.8 MG/DL (ref 0.5–1.4)
CREAT UR-MCNC: 88.8 MG/DL (ref 15–325)
CTP QC/QA: YES
DIFFERENTIAL METHOD: ABNORMAL
EOSINOPHIL # BLD AUTO: 0 K/UL (ref 0–0.5)
EOSINOPHIL NFR BLD: 0.2 % (ref 0–8)
ERYTHROCYTE [DISTWIDTH] IN BLOOD BY AUTOMATED COUNT: 13.4 % (ref 11.5–14.5)
EST. GFR  (AFRICAN AMERICAN): 13 ML/MIN/1.73 M^2
EST. GFR  (NON AFRICAN AMERICAN): 11 ML/MIN/1.73 M^2
GLUCOSE SERPL-MCNC: 100 MG/DL (ref 70–110)
GLUCOSE UR QL STRIP: NEGATIVE
HCT VFR BLD AUTO: 34.1 % (ref 37–48.5)
HGB BLD-MCNC: 11.4 G/DL (ref 12–16)
HGB UR QL STRIP: ABNORMAL
IMM GRANULOCYTES # BLD AUTO: 0.1 K/UL (ref 0–0.04)
IMM GRANULOCYTES NFR BLD AUTO: 0.9 % (ref 0–0.5)
KETONES UR QL STRIP: ABNORMAL
LEUKOCYTE ESTERASE UR QL STRIP: NEGATIVE
LYMPHOCYTES # BLD AUTO: 1 K/UL (ref 1–4.8)
LYMPHOCYTES NFR BLD: 8.8 % (ref 18–48)
MCH RBC QN AUTO: 31.7 PG (ref 27–31)
MCHC RBC AUTO-ENTMCNC: 33.4 G/DL (ref 32–36)
MCV RBC AUTO: 95 FL (ref 82–98)
METHADONE UR QL SCN>300 NG/ML: NEGATIVE
MICROSCOPIC COMMENT: NORMAL
MONOCYTES # BLD AUTO: 0.6 K/UL (ref 0.3–1)
MONOCYTES NFR BLD: 5.4 % (ref 4–15)
NEUTROPHILS # BLD AUTO: 9.6 K/UL (ref 1.8–7.7)
NEUTROPHILS NFR BLD: 84.5 % (ref 38–73)
NITRITE UR QL STRIP: NEGATIVE
NRBC BLD-RTO: 0 /100 WBC
OB PNL STL: POSITIVE
OPIATES UR QL SCN: NEGATIVE
PCP UR QL SCN>25 NG/ML: NEGATIVE
PH UR STRIP: 6 [PH] (ref 5–8)
PLATELET # BLD AUTO: 214 K/UL (ref 150–450)
PMV BLD AUTO: 9.7 FL (ref 9.2–12.9)
POTASSIUM SERPL-SCNC: 4.3 MMOL/L (ref 3.5–5.1)
PROT SERPL-MCNC: 8.3 G/DL (ref 6–8.4)
PROT UR QL STRIP: NEGATIVE
RBC # BLD AUTO: 3.6 M/UL (ref 4–5.4)
RBC #/AREA URNS HPF: 2 /HPF (ref 0–4)
SARS-COV-2 RDRP RESP QL NAA+PROBE: NEGATIVE
SODIUM SERPL-SCNC: 132 MMOL/L (ref 136–145)
SP GR UR STRIP: 1.01 (ref 1–1.03)
TOXICOLOGY INFORMATION: ABNORMAL
TROPONIN I SERPL DL<=0.01 NG/ML-MCNC: 0.02 NG/ML (ref 0–0.03)
URN SPEC COLLECT METH UR: ABNORMAL
UROBILINOGEN UR STRIP-ACNC: NEGATIVE EU/DL
WBC # BLD AUTO: 11.32 K/UL (ref 3.9–12.7)

## 2022-06-06 PROCEDURE — 99999 PR PBB SHADOW E&M-EST. PATIENT-LVL III: ICD-10-PCS | Mod: PBBFAC,,, | Performed by: FAMILY MEDICINE

## 2022-06-06 PROCEDURE — 1159F MED LIST DOCD IN RCRD: CPT | Mod: CPTII,,, | Performed by: FAMILY MEDICINE

## 2022-06-06 PROCEDURE — 99214 PR OFFICE/OUTPT VISIT, EST, LEVL IV, 30-39 MIN: ICD-10-PCS | Mod: S$PBB,,, | Performed by: FAMILY MEDICINE

## 2022-06-06 PROCEDURE — 4010F PR ACE/ARB THEARPY RXD/TAKEN: ICD-10-PCS | Mod: CPTII,,, | Performed by: FAMILY MEDICINE

## 2022-06-06 PROCEDURE — 80053 COMPREHEN METABOLIC PANEL: CPT | Performed by: EMERGENCY MEDICINE

## 2022-06-06 PROCEDURE — 3078F PR MOST RECENT DIASTOLIC BLOOD PRESSURE < 80 MM HG: ICD-10-PCS | Mod: CPTII,,, | Performed by: FAMILY MEDICINE

## 2022-06-06 PROCEDURE — U0002 COVID-19 LAB TEST NON-CDC: HCPCS | Performed by: EMERGENCY MEDICINE

## 2022-06-06 PROCEDURE — 99214 OFFICE O/P EST MOD 30 MIN: CPT | Mod: S$PBB,,, | Performed by: FAMILY MEDICINE

## 2022-06-06 PROCEDURE — 25000003 PHARM REV CODE 250: Performed by: EMERGENCY MEDICINE

## 2022-06-06 PROCEDURE — 80307 DRUG TEST PRSMV CHEM ANLYZR: CPT | Performed by: EMERGENCY MEDICINE

## 2022-06-06 PROCEDURE — 99999 PR PBB SHADOW E&M-EST. PATIENT-LVL III: CPT | Mod: PBBFAC,,, | Performed by: FAMILY MEDICINE

## 2022-06-06 PROCEDURE — 1159F PR MEDICATION LIST DOCUMENTED IN MEDICAL RECORD: ICD-10-PCS | Mod: CPTII,,, | Performed by: FAMILY MEDICINE

## 2022-06-06 PROCEDURE — 21400001 HC TELEMETRY ROOM

## 2022-06-06 PROCEDURE — 82272 OCCULT BLD FECES 1-3 TESTS: CPT | Performed by: EMERGENCY MEDICINE

## 2022-06-06 PROCEDURE — 3074F PR MOST RECENT SYSTOLIC BLOOD PRESSURE < 130 MM HG: ICD-10-PCS | Mod: CPTII,,, | Performed by: FAMILY MEDICINE

## 2022-06-06 PROCEDURE — 4010F ACE/ARB THERAPY RXD/TAKEN: CPT | Mod: CPTII,,, | Performed by: FAMILY MEDICINE

## 2022-06-06 PROCEDURE — 99213 OFFICE O/P EST LOW 20 MIN: CPT | Mod: PBBFAC,PN | Performed by: FAMILY MEDICINE

## 2022-06-06 PROCEDURE — 93010 EKG 12-LEAD: ICD-10-PCS | Mod: ,,, | Performed by: INTERNAL MEDICINE

## 2022-06-06 PROCEDURE — 3078F DIAST BP <80 MM HG: CPT | Mod: CPTII,,, | Performed by: FAMILY MEDICINE

## 2022-06-06 PROCEDURE — 36415 COLL VENOUS BLD VENIPUNCTURE: CPT | Performed by: EMERGENCY MEDICINE

## 2022-06-06 PROCEDURE — 84443 ASSAY THYROID STIM HORMONE: CPT | Performed by: NURSE PRACTITIONER

## 2022-06-06 PROCEDURE — G0378 HOSPITAL OBSERVATION PER HR: HCPCS

## 2022-06-06 PROCEDURE — 84484 ASSAY OF TROPONIN QUANT: CPT | Performed by: EMERGENCY MEDICINE

## 2022-06-06 PROCEDURE — 85025 COMPLETE CBC W/AUTO DIFF WBC: CPT | Performed by: EMERGENCY MEDICINE

## 2022-06-06 PROCEDURE — 86901 BLOOD TYPING SEROLOGIC RH(D): CPT | Performed by: EMERGENCY MEDICINE

## 2022-06-06 PROCEDURE — 81000 URINALYSIS NONAUTO W/SCOPE: CPT | Mod: 59 | Performed by: EMERGENCY MEDICINE

## 2022-06-06 PROCEDURE — 96360 HYDRATION IV INFUSION INIT: CPT

## 2022-06-06 PROCEDURE — 83880 ASSAY OF NATRIURETIC PEPTIDE: CPT | Performed by: EMERGENCY MEDICINE

## 2022-06-06 PROCEDURE — 99291 CRITICAL CARE FIRST HOUR: CPT | Mod: 25

## 2022-06-06 PROCEDURE — 93010 ELECTROCARDIOGRAM REPORT: CPT | Mod: ,,, | Performed by: INTERNAL MEDICINE

## 2022-06-06 PROCEDURE — 63600175 PHARM REV CODE 636 W HCPCS: Performed by: NURSE PRACTITIONER

## 2022-06-06 PROCEDURE — 3074F SYST BP LT 130 MM HG: CPT | Mod: CPTII,,, | Performed by: FAMILY MEDICINE

## 2022-06-06 PROCEDURE — 25000003 PHARM REV CODE 250: Performed by: NURSE PRACTITIONER

## 2022-06-06 PROCEDURE — 84439 ASSAY OF FREE THYROXINE: CPT | Performed by: NURSE PRACTITIONER

## 2022-06-06 PROCEDURE — 93005 ELECTROCARDIOGRAM TRACING: CPT

## 2022-06-06 RX ORDER — IBUPROFEN 200 MG
24 TABLET ORAL
Status: DISCONTINUED | OUTPATIENT
Start: 2022-06-06 | End: 2022-06-10 | Stop reason: HOSPADM

## 2022-06-06 RX ORDER — GLUCAGON 1 MG
1 KIT INJECTION
Status: DISCONTINUED | OUTPATIENT
Start: 2022-06-06 | End: 2022-06-10 | Stop reason: HOSPADM

## 2022-06-06 RX ORDER — SODIUM CHLORIDE 9 MG/ML
INJECTION, SOLUTION INTRAVENOUS CONTINUOUS
Status: DISCONTINUED | OUTPATIENT
Start: 2022-06-06 | End: 2022-06-06

## 2022-06-06 RX ORDER — PANTOPRAZOLE SODIUM 40 MG/10ML
40 INJECTION, POWDER, LYOPHILIZED, FOR SOLUTION INTRAVENOUS DAILY
Status: DISCONTINUED | OUTPATIENT
Start: 2022-06-07 | End: 2022-06-10 | Stop reason: HOSPADM

## 2022-06-06 RX ORDER — SERTRALINE HYDROCHLORIDE 50 MG/1
50 TABLET, FILM COATED ORAL DAILY
Status: DISCONTINUED | OUTPATIENT
Start: 2022-06-07 | End: 2022-06-10 | Stop reason: HOSPADM

## 2022-06-06 RX ORDER — IBUPROFEN 200 MG
16 TABLET ORAL
Status: DISCONTINUED | OUTPATIENT
Start: 2022-06-06 | End: 2022-06-10 | Stop reason: HOSPADM

## 2022-06-06 RX ORDER — ONDANSETRON 2 MG/ML
4 INJECTION INTRAMUSCULAR; INTRAVENOUS EVERY 8 HOURS PRN
Status: DISCONTINUED | OUTPATIENT
Start: 2022-06-06 | End: 2022-06-10 | Stop reason: HOSPADM

## 2022-06-06 RX ORDER — SODIUM CHLORIDE 9 MG/ML
1000 INJECTION, SOLUTION INTRAVENOUS
Status: COMPLETED | OUTPATIENT
Start: 2022-06-06 | End: 2022-06-06

## 2022-06-06 RX ORDER — ACETAMINOPHEN 325 MG/1
650 TABLET ORAL EVERY 8 HOURS PRN
Status: DISCONTINUED | OUTPATIENT
Start: 2022-06-06 | End: 2022-06-10 | Stop reason: HOSPADM

## 2022-06-06 RX ORDER — FAMOTIDINE 20 MG/1
20 TABLET, FILM COATED ORAL DAILY
Status: DISCONTINUED | OUTPATIENT
Start: 2022-06-07 | End: 2022-06-06

## 2022-06-06 RX ORDER — DEXTROSE MONOHYDRATE AND SODIUM CHLORIDE 5; .9 G/100ML; G/100ML
INJECTION, SOLUTION INTRAVENOUS CONTINUOUS
Status: DISCONTINUED | OUTPATIENT
Start: 2022-06-06 | End: 2022-06-08

## 2022-06-06 RX ORDER — HYDROCODONE BITARTRATE AND ACETAMINOPHEN 5; 325 MG/1; MG/1
1 TABLET ORAL EVERY 6 HOURS PRN
Status: DISCONTINUED | OUTPATIENT
Start: 2022-06-07 | End: 2022-06-07

## 2022-06-06 RX ORDER — TRAMADOL HYDROCHLORIDE 50 MG/1
50 TABLET ORAL EVERY 6 HOURS PRN
Status: DISCONTINUED | OUTPATIENT
Start: 2022-06-06 | End: 2022-06-06

## 2022-06-06 RX ORDER — CARVEDILOL 6.25 MG/1
6.25 TABLET ORAL 2 TIMES DAILY
Status: DISCONTINUED | OUTPATIENT
Start: 2022-06-06 | End: 2022-06-10 | Stop reason: HOSPADM

## 2022-06-06 RX ADMIN — SODIUM CHLORIDE 1000 ML: 0.9 INJECTION, SOLUTION INTRAVENOUS at 08:06

## 2022-06-06 RX ADMIN — SODIUM CHLORIDE 1000 ML: 0.9 INJECTION, SOLUTION INTRAVENOUS at 05:06

## 2022-06-06 RX ADMIN — TRAMADOL HYDROCHLORIDE 50 MG: 50 TABLET ORAL at 11:06

## 2022-06-06 RX ADMIN — DEXTROSE AND SODIUM CHLORIDE: 5; .9 INJECTION, SOLUTION INTRAVENOUS at 11:06

## 2022-06-06 RX ADMIN — CARVEDILOL 6.25 MG: 6.25 TABLET, FILM COATED ORAL at 11:06
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
## 2022-06-06 NOTE — ED NOTES
Pt cleaned; diaper and underpad and changed, noted to have a black tarry stool. In and out catheter performed for urine sample; urine sent to lab. Purewick in place. NS infusing; pt encouraged to keep arm straight so fluids will continue to flow.  at bedside throughout process. Call bell in reach.

## 2022-06-06 NOTE — TELEPHONE ENCOUNTER
Placed per Providers orders, EMS was activated, arrived 10 minutes patient was transported to [facility] for further evaluation orders written per Provider    No

## 2022-06-07 ENCOUNTER — TELEPHONE (OUTPATIENT)
Dept: PRIMARY CARE CLINIC | Facility: CLINIC | Age: 32
End: 2022-06-07
Payer: MEDICAID

## 2022-06-07 LAB
ANION GAP SERPL CALC-SCNC: 19 MMOL/L (ref 8–16)
BASOPHILS # BLD AUTO: 0.03 K/UL (ref 0–0.2)
BASOPHILS NFR BLD: 0.4 % (ref 0–1.9)
BUN SERPL-MCNC: 83 MG/DL (ref 6–20)
CALCIUM SERPL-MCNC: 8.7 MG/DL (ref 8.7–10.5)
CHLORIDE SERPL-SCNC: 102 MMOL/L (ref 95–110)
CO2 SERPL-SCNC: 15 MMOL/L (ref 23–29)
CREAT SERPL-MCNC: 3.9 MG/DL (ref 0.5–1.4)
DIFFERENTIAL METHOD: ABNORMAL
EOSINOPHIL # BLD AUTO: 0 K/UL (ref 0–0.5)
EOSINOPHIL NFR BLD: 0.5 % (ref 0–8)
ERYTHROCYTE [DISTWIDTH] IN BLOOD BY AUTOMATED COUNT: 13.3 % (ref 11.5–14.5)
EST. GFR  (AFRICAN AMERICAN): 17 ML/MIN/1.73 M^2
EST. GFR  (NON AFRICAN AMERICAN): 14 ML/MIN/1.73 M^2
GLUCOSE SERPL-MCNC: 121 MG/DL (ref 70–110)
HCT VFR BLD AUTO: 30.1 % (ref 37–48.5)
HGB BLD-MCNC: 9.7 G/DL (ref 12–16)
IMM GRANULOCYTES # BLD AUTO: 0.05 K/UL (ref 0–0.04)
IMM GRANULOCYTES NFR BLD AUTO: 0.6 % (ref 0–0.5)
LYMPHOCYTES # BLD AUTO: 1.1 K/UL (ref 1–4.8)
LYMPHOCYTES NFR BLD: 12.6 % (ref 18–48)
MCH RBC QN AUTO: 31.8 PG (ref 27–31)
MCHC RBC AUTO-ENTMCNC: 32.2 G/DL (ref 32–36)
MCV RBC AUTO: 99 FL (ref 82–98)
MONOCYTES # BLD AUTO: 0.6 K/UL (ref 0.3–1)
MONOCYTES NFR BLD: 7.2 % (ref 4–15)
NEUTROPHILS # BLD AUTO: 6.5 K/UL (ref 1.8–7.7)
NEUTROPHILS NFR BLD: 78.7 % (ref 38–73)
NRBC BLD-RTO: 0 /100 WBC
PLATELET # BLD AUTO: 174 K/UL (ref 150–450)
PMV BLD AUTO: 9.4 FL (ref 9.2–12.9)
POTASSIUM SERPL-SCNC: 3.8 MMOL/L (ref 3.5–5.1)
RBC # BLD AUTO: 3.05 M/UL (ref 4–5.4)
SODIUM SERPL-SCNC: 136 MMOL/L (ref 136–145)
T4 FREE SERPL-MCNC: 1.08 NG/DL (ref 0.71–1.51)
TSH SERPL DL<=0.005 MIU/L-ACNC: 4.98 UIU/ML (ref 0.4–4)
WBC # BLD AUTO: 8.31 K/UL (ref 3.9–12.7)

## 2022-06-07 PROCEDURE — 63600175 PHARM REV CODE 636 W HCPCS: Performed by: NURSE PRACTITIONER

## 2022-06-07 PROCEDURE — 25000003 PHARM REV CODE 250: Performed by: NURSE PRACTITIONER

## 2022-06-07 PROCEDURE — 85025 COMPLETE CBC W/AUTO DIFF WBC: CPT | Performed by: NURSE PRACTITIONER

## 2022-06-07 PROCEDURE — C9113 INJ PANTOPRAZOLE SODIUM, VIA: HCPCS | Performed by: NURSE PRACTITIONER

## 2022-06-07 PROCEDURE — 97530 THERAPEUTIC ACTIVITIES: CPT

## 2022-06-07 PROCEDURE — 21400001 HC TELEMETRY ROOM

## 2022-06-07 PROCEDURE — 80048 BASIC METABOLIC PNL TOTAL CA: CPT | Performed by: NURSE PRACTITIONER

## 2022-06-07 PROCEDURE — 36415 COLL VENOUS BLD VENIPUNCTURE: CPT | Performed by: NURSE PRACTITIONER

## 2022-06-07 PROCEDURE — 97162 PT EVAL MOD COMPLEX 30 MIN: CPT

## 2022-06-07 RX ORDER — TRAZODONE HYDROCHLORIDE 50 MG/1
50 TABLET ORAL NIGHTLY PRN
Status: DISCONTINUED | OUTPATIENT
Start: 2022-06-08 | End: 2022-06-10 | Stop reason: HOSPADM

## 2022-06-07 RX ORDER — CLONAZEPAM 1 MG/1
1 TABLET ORAL DAILY
Status: DISCONTINUED | OUTPATIENT
Start: 2022-06-07 | End: 2022-06-09

## 2022-06-07 RX ORDER — HYDROCODONE BITARTRATE AND ACETAMINOPHEN 10; 325 MG/1; MG/1
1 TABLET ORAL EVERY 4 HOURS PRN
Status: DISCONTINUED | OUTPATIENT
Start: 2022-06-07 | End: 2022-06-10 | Stop reason: HOSPADM

## 2022-06-07 RX ORDER — HYDROCODONE BITARTRATE AND ACETAMINOPHEN 10; 325 MG/1; MG/1
1 TABLET ORAL EVERY 6 HOURS PRN
Status: DISCONTINUED | OUTPATIENT
Start: 2022-06-07 | End: 2022-06-07

## 2022-06-07 RX ADMIN — DEXTROSE AND SODIUM CHLORIDE: 5; .9 INJECTION, SOLUTION INTRAVENOUS at 11:06

## 2022-06-07 RX ADMIN — PANTOPRAZOLE SODIUM 40 MG: 40 INJECTION, POWDER, FOR SOLUTION INTRAVENOUS at 08:06

## 2022-06-07 RX ADMIN — CLONAZEPAM 1 MG: 1 TABLET ORAL at 11:06

## 2022-06-07 RX ADMIN — SERTRALINE HYDROCHLORIDE 50 MG: 50 TABLET ORAL at 08:06

## 2022-06-07 RX ADMIN — HYDROCODONE BITARTRATE AND ACETAMINOPHEN 1 TABLET: 10; 325 TABLET ORAL at 12:06

## 2022-06-07 RX ADMIN — CARVEDILOL 6.25 MG: 6.25 TABLET, FILM COATED ORAL at 08:06

## 2022-06-07 RX ADMIN — HYDROCODONE BITARTRATE AND ACETAMINOPHEN 1 TABLET: 10; 325 TABLET ORAL at 10:06

## 2022-06-07 RX ADMIN — HYDROCODONE BITARTRATE AND ACETAMINOPHEN 1 TABLET: 5; 325 TABLET ORAL at 08:06

## 2022-06-07 RX ADMIN — HYDROCODONE BITARTRATE AND ACETAMINOPHEN 1 TABLET: 10; 325 TABLET ORAL at 06:06

## 2022-06-07 NOTE — PROGRESS NOTES
[facility] Medicine  Progress Note    Patient Name: Francis Bains  MRN: [id number]  Patient Class: IP- Inpatient   Admission Date: 6/6/2022  Length of Stay: 0 days  Attending Physician: Kali Charles MD  Primary Care Provider: Bri Garnica MD        Subjective:     Principal Problem:Dehydration          HPI:  Francis Bains is a 32 y.o. female patient with a PMHx of cerbral palsy, kidney disease, HTN, and ana maria syndrome who presents to the Emergency Department for evaluation of hypotension. Pt's father reports that pt was extremely weak and lightheaded 2 weeks ago and they went to the ED in . She was admitted to the hospital for 2 nights for severe dehydration and decreased kidney function. Pt was able to walk when she was first admitted, but has been unable to walk since being discharged. Pt's father reports that she has been getting weaker each day and is now barely able to move her arms. He also reports that pt has been getting sicker since February. Associated sxs include black stool, dizziness, nausea, vomiting, decreased appetite, and decreased urine. Patient denies any fever, dysuria, SOB, CP, and all other sxs at this time. In the ED pt was found to be hypotensive, chloride 93, CO2 15, Anion gap 24, BUN 88, Creatinine 4.8; UDS + for benzo and THC, + occult blood; CXR negative. Patient received 2L of NS in the ED. Patient placed in observation for dehydration and JESSICA under the care of hospital medicine. Of note pt mother reports pt has been leaving with her grandmother who has Munchausen syndrome.         Overview/Hospital Course:  6/7/22 No acute events overnight. The patient went for a CT ABD/pelvis which showed no acute abnormality. JESSICA is improved overnight with IV hydraation. Will continue IV fluids and monitor renal function. The patient reports blsck stool recently but none since admission. H/H today is 9.7/30.1. Will continue to monitor H/H and will  consult GI for any further drop in H/H or further melena. Continue current management.       Interval History: No acute events overnight. The patient went for a CT ABD/pelvis which showed no acute abnormality. JESSICA is improved overnight with IV hydraation. Will continue IV fluids and monitor renal function. The patient reports blsck stool recently but none since admission. H/H today is 9.7/30.1. Will continue to monitor H/H and will consult GI for any further drop in H/H or further melena. Continue current management.     Review of Systems   Constitutional:  Positive for activity change and appetite change. Negative for chills, diaphoresis, fatigue, fever and unexpected weight change.   HENT: Negative.  Negative for congestion, drooling, facial swelling, rhinorrhea, sinus pressure, sneezing and sore throat.    Eyes: Negative.  Negative for discharge, redness, itching and visual disturbance.   Respiratory: Negative.  Negative for apnea, cough, choking, chest tightness, shortness of breath, wheezing and stridor.    Cardiovascular: Negative.  Negative for chest pain, palpitations and leg swelling.   Gastrointestinal:  Positive for nausea and vomiting. Negative for abdominal distention, abdominal pain, anal bleeding, blood in stool, constipation and diarrhea.        Dark stools    Endocrine: Negative.    Genitourinary: Negative.  Negative for decreased urine volume, difficulty urinating, dysuria, frequency, hematuria, pelvic pain, urgency, vaginal bleeding and vaginal discharge.   Musculoskeletal: Negative.  Negative for arthralgias, back pain, gait problem, joint swelling, myalgias, neck pain and neck stiffness.   Skin: Negative.  Negative for color change, pallor, rash and wound.   Allergic/Immunologic: Negative.    Neurological:  Positive for dizziness, weakness and light-headedness. Negative for seizures, facial asymmetry, speech difficulty, numbness and headaches.   Hematological: Negative.     Psychiatric/Behavioral: Negative.  Negative for agitation, confusion, hallucinations and suicidal ideas.    All other systems reviewed and are negative.  Objective:     Vital Signs (Most Recent):  Temp: 98.3 °F (36.8 °C) (06/07/22 1143)  Pulse: 92 (06/07/22 1143)  Resp: 18 (06/07/22 1255)  BP: (!) 138/92 (06/07/22 1159)  SpO2: 99 % (06/07/22 1143)   Vital Signs (24h Range):  Temp:  [97.4 °F (36.3 °C)-98.3 °F (36.8 °C)] 98.3 °F (36.8 °C)  Pulse:  [] 92  Resp:  [13-21] 18  SpO2:  [99 %-100 %] 99 %  BP: ()/() 138/92     Weight: 89 kg (196 lb 3.4 oz)  Body mass index is 34.76 kg/m².    Intake/Output Summary (Last 24 hours) at 6/7/2022 1459  Last data filed at 6/7/2022 0800  Gross per 24 hour   Intake 1568.12 ml   Output 1200 ml   Net 368.12 ml      Physical Exam  Vitals and nursing note reviewed.   Constitutional:       Appearance: She is well-developed. She is ill-appearing.      Comments: Pt Deaf    HENT:      Head: Normocephalic and atraumatic.   Eyes:      Conjunctiva/sclera: Conjunctivae normal.      Pupils: Pupils are equal, round, and reactive to light.   Cardiovascular:      Rate and Rhythm: Normal rate and regular rhythm.      Heart sounds: Normal heart sounds. No murmur heard.  Pulmonary:      Effort: Pulmonary effort is normal. No respiratory distress.      Breath sounds: Normal breath sounds.   Abdominal:      General: Bowel sounds are normal. There is no distension.      Palpations: Abdomen is soft.      Tenderness: There is abdominal tenderness (mild).   Musculoskeletal:         General: No tenderness or deformity. Normal range of motion.      Cervical back: Normal range of motion and neck supple.      Comments: Muscle wasting of lower extremities   Skin:     General: Skin is warm and dry.      Findings: No erythema.   Neurological:      Mental Status: She is alert and oriented to person, place, and time.      Deep Tendon Reflexes: Reflexes are normal and symmetric.   Psychiatric:          Behavior: Behavior normal.         Thought Content: Thought content normal.         Judgment: Judgment normal.       Significant Labs: All pertinent labs within the past 24 hours have been reviewed.    Significant Imaging:   Imaging Results              X-Ray Chest AP Portable (Final result)  Result time 06/06/22 17:21:21      Final result by Shadi Aguilar MD (06/06/22 17:21:21)                   Impression:      No acute abnormality.      Electronically signed by: Jeff Hsu  Date:    06/06/2022  Time:    17:21               Narrative:    EXAMINATION:  XR CHEST AP PORTABLE    CLINICAL HISTORY:  Chest Pain;    TECHNIQUE:  Single frontal view of the chest was performed.    COMPARISON:  None    FINDINGS:  Mild patient rotation.The lungs are clear, with normal appearance of pulmonary vasculature and no pleural effusion or pneumothorax.    The cardiac silhouette is normal in size. The hilar and mediastinal contours are unremarkable.    Bones are intact.                                          Assessment/Plan:      * Dehydration  Due to GI losses   IVFs   Antiemetics prn   6/7/22 The patient went for a CT ABD/pelvis which showed no acute abnormality. JESSICA is improved overnight with IV hydration. Will continue IV fluids and monitor renal function.     Melena  x2 episodes of melena   H/H stable   Monitor CBC   IV Protonix   CT abdomen pending   GI consult pending clinical course   6/7/22 The patient went for a CT ABD/pelvis which showed no acute abnormality. The patient reports black stool recently but none since admission. H/H today is 9.7/30.1. Will continue to monitor H/H and will consult GI for any further drop in H/H or further melena. Continue current management.     Debility  Hx cerebral palsy   PT/OT   Turn q2 hours         Gastroparesis  Likely due to cerebral palsy/THC?   Antiemetics  prn         Acute renal failure superimposed on stage 4 chronic kidney disease  Creatinine 4.8 (2.8 - 2 weeks ago)   IVFs    Follow Bmp   Nephrology consult pending clinical course     HTN (hypertension)  Resume home meds   Bp stable       Major depression  Resume zoloft         VTE Risk Mitigation (From admission, onward)         Ordered     Place SAHIL hose  Until discontinued         06/06/22 2209     IP VTE HIGH RISK PATIENT  Once         06/06/22 2209     Place sequential compression device  Until discontinued         06/06/22 2209     Place sequential compression device  Until discontinued         06/06/22 2209                Discharge Planning   MARY KATE:      Code Status: Full Code   Is the patient medically ready for discharge?:     Reason for patient still in hospital (select all that apply): Patient unstable  Discharge Plan A: Home, Home with family                  Mal Stern NP  Department of Hospital Medicine   [facility] - Telemetry ([facility])

## 2022-06-07 NOTE — HPI
Francis Bains is a 32 y.o. female patient with a PMHx of cerbral palsy, kidney disease, HTN, and ana maria syndrome who presents to the Emergency Department for evaluation of hypotension. Pt's father reports that pt was extremely weak and lightheaded 2 weeks ago and they went to the ED in [facility]. She was admitted to the hospital for 2 nights for severe dehydration and decreased kidney function. Pt was able to walk when she was first admitted, but has been unable to walk since being discharged. Pt's father reports that she has been getting weaker each day and is now barely able to move her arms. He also reports that pt has been getting sicker since February. Associated sxs include black stool, dizziness, nausea, vomiting, decreased appetite, and decreased urine. Patient denies any fever, dysuria, SOB, CP, and all other sxs at this time. In the ED pt was found to be hypotensive, chloride 93, CO2 15, Anion gap 24, BUN 88, Creatinine 4.8; UDS + for benzo and THC, + occult blood; CXR negative. Patient received 2L of NS in the ED. Patient placed in observation for dehydration and JESSICA under the care of hospital medicine. Of note pt mother reports pt has been leaving with her grandmother who has Munchausen syndrome.

## 2022-06-07 NOTE — ASSESSMENT & PLAN NOTE
x2 episodes of melena   H/H stable   Monitor CBC   IV Protonix   CT abdomen pending   GI consult pending clinical course

## 2022-06-07 NOTE — H&P
[facility] Medicine  History & Physical    Patient Name: Francis Bains  MRN: [id number]  Patient Class: OP- Observation  Admission Date: 6/6/2022  Attending Physician: Eric Manning MD   Primary Care Provider: Bri Garnica MD         Patient information was obtained from patient, relative(s) and ER records.     Subjective:     Principal Problem:Dehydration    Chief Complaint:   Chief Complaint   Patient presents with    Hypotension     Increased weakness, black stool, hypotensive en route        HPI: Francis Bains is a 32 y.o. female patient with a PMHx of cerbral palsy, kidney disease, HTN, and oswaldo syndrome who presents to the Emergency Department for evaluation of hypotension. Pt's father reports that pt was extremely weak and lightheaded 2 weeks ago and they went to the ED in [facility]. She was admitted to the hospital for 2 nights for severe dehydration and decreased kidney function. Pt was able to walk when she was first admitted, but has been unable to walk since being discharged. Pt's father reports that she has been getting weaker each day and is now barely able to move her arms. He also reports that pt has been getting sicker since February. Associated sxs include black stool, dizziness, nausea, vomiting, decreased appetite, and decreased urine. Patient denies any fever, dysuria, SOB, CP, and all other sxs at this time. In the ED pt was found to be hypotensive, chloride 93, CO2 15, Anion gap 24, BUN 88, Creatinine 4.8; UDS + for benzo and THC, + occult blood; CXR negative. Patient received 2L of NS in the ED. Patient placed in observation for dehydration and JESSICA under the care of hospital medicine. Of note pt mother reports pt has been leaving with her grandmother who has Munchausen syndrome.         Past Medical History:   Diagnosis Date    Cerebral palsy     Hearing impaired person     Hypertension     Osteopenia determined by x-ray     Oswaldo syndrome         Past Surgical History:   Procedure Laterality Date    CHOLECYSTECTOMY      INNER EAR SURGERY      RENAL BIOPSY  07/07/14       Review of patient's allergies indicates:   Allergen Reactions    Ace inhibitors Other (See Comments)    Nabumetone Other (See Comments)    Nsaids (non-steroidal anti-inflammatory drug) Other (See Comments)    Sulfa (sulfonamide antibiotics) Hives    Pcn  [penicillins] Rash       No current facility-administered medications on file prior to encounter.     Current Outpatient Medications on File Prior to Encounter   Medication Sig    alendronate (FOSAMAX) 70 MG tablet Take 1 tablet (70 mg total) by mouth every 7 days.    amlodipine (NORVASC) 10 MG tablet Take 10 mg by mouth once daily.    carvedilol (COREG) 6.25 MG tablet 12.5 mg.     clonazePAM (KLONOPIN) 1 MG tablet Take 1 tablet (1 mg total) by mouth daily as needed for Anxiety.    ergocalciferol (ERGOCALCIFEROL) 50,000 unit Cap Vitamin D2 50,000 unit capsule    estrogen, conjugated,-medroxyprogesterone 0.625-2.5mg (PREMPRO) 0.625-2.5 mg per tablet Take 1 tablet by mouth once daily.    famotidine (PEPCID) 20 MG tablet TAKE 1 TABLET PO TWICE A DAY    famotidine (PEPCID) 20 MG tablet Take 1 tablet (20 mg total) by mouth 2 (two) times daily.    ondansetron (ZOFRAN-ODT) 4 MG TbDL Take 1 tablet (4 mg total) by mouth every 8 (eight) hours as needed (nausea).    pantoprazole (PROTONIX) 40 MG tablet TK 1 T PO  D    sertraline (ZOLOFT) 100 MG tablet     traZODone (DESYREL) 50 MG tablet     [DISCONTINUED] losartan (COZAAR) 50 MG tablet losartan 50 mg tablet     Family History    None       Tobacco Use    Smoking status: Former Smoker     Packs/day: 0.25     Years: 9.00     Pack years: 2.25    Smokeless tobacco: Never Used   Substance and Sexual Activity    Alcohol use: Yes     Comment: Social    Drug use: Yes     Types: Marijuana    Sexual activity: Yes     Partners: Male     Birth control/protection: Inserts      Comment: Single.     Review of Systems   Constitutional:  Positive for activity change and appetite change.   HENT: Negative.     Eyes: Negative.    Respiratory: Negative.     Cardiovascular: Negative.    Gastrointestinal:  Positive for nausea and vomiting.        Dark stools    Endocrine: Negative.    Genitourinary: Negative.    Musculoskeletal: Negative.    Skin: Negative.    Allergic/Immunologic: Negative.    Neurological:  Positive for dizziness, weakness and light-headedness.   Hematological: Negative.    Psychiatric/Behavioral: Negative.     Objective:     Vital Signs (Most Recent):  Temp: 98 °F (36.7 °C) (06/06/22 2154)  Pulse: 100 (06/06/22 2154)  Resp: 17 (06/06/22 2154)  BP: (!) 140/98 (06/06/22 2154)  SpO2: 100 % (06/06/22 2154)   Vital Signs (24h Range):  Temp:  [98 °F (36.7 °C)-98.1 °F (36.7 °C)] 98 °F (36.7 °C)  Pulse:  [] 100  Resp:  [13-21] 17  SpO2:  [98 %-100 %] 100 %  BP: ()/() 140/98     Weight: 89 kg (196 lb 3.4 oz)  Body mass index is 34.76 kg/m².    Physical Exam  Vitals and nursing note reviewed.   Constitutional:       Appearance: She is well-developed. She is ill-appearing.      Comments: Pt Deaf    HENT:      Head: Normocephalic and atraumatic.   Eyes:      Conjunctiva/sclera: Conjunctivae normal.      Pupils: Pupils are equal, round, and reactive to light.   Cardiovascular:      Rate and Rhythm: Normal rate and regular rhythm.      Heart sounds: Normal heart sounds.   Pulmonary:      Effort: Pulmonary effort is normal.      Breath sounds: Normal breath sounds.   Abdominal:      General: Bowel sounds are normal.      Palpations: Abdomen is soft.      Tenderness: There is abdominal tenderness (mild).   Musculoskeletal:         General: Normal range of motion.      Cervical back: Normal range of motion and neck supple.      Comments: Muscle wasting of lower extremities   Skin:     General: Skin is warm and dry.   Neurological:      Mental Status: She is alert and oriented  to person, place, and time.      Deep Tendon Reflexes: Reflexes are normal and symmetric.   Psychiatric:         Behavior: Behavior normal.         Thought Content: Thought content normal.         Judgment: Judgment normal.     Significant Labs:   Results for orders placed or performed during the hospital encounter of 06/06/22   CBC auto differential   Result Value Ref Range    WBC 11.32 3.90 - 12.70 K/uL    RBC 3.60 (L) 4.00 - 5.40 M/uL    Hemoglobin 11.4 (L) 12.0 - 16.0 g/dL    Hematocrit 34.1 (L) 37.0 - 48.5 %    MCV 95 82 - 98 fL    MCH 31.7 (H) 27.0 - 31.0 pg    MCHC 33.4 32.0 - 36.0 g/dL    RDW 13.4 11.5 - 14.5 %    Platelets 214 150 - 450 K/uL    MPV 9.7 9.2 - 12.9 fL    Immature Granulocytes 0.9 (H) 0.0 - 0.5 %    Gran # (ANC) 9.6 (H) 1.8 - 7.7 K/uL    Immature Grans (Abs) 0.10 (H) 0.00 - 0.04 K/uL    Lymph # 1.0 1.0 - 4.8 K/uL    Mono # 0.6 0.3 - 1.0 K/uL    Eos # 0.0 0.0 - 0.5 K/uL    Baso # 0.02 0.00 - 0.20 K/uL    nRBC 0 0 /100 WBC    Gran % 84.5 (H) 38.0 - 73.0 %    Lymph % 8.8 (L) 18.0 - 48.0 %    Mono % 5.4 4.0 - 15.0 %    Eosinophil % 0.2 0.0 - 8.0 %    Basophil % 0.2 0.0 - 1.9 %    Differential Method Automated    Comprehensive metabolic panel   Result Value Ref Range    Sodium 132 (L) 136 - 145 mmol/L    Potassium 4.3 3.5 - 5.1 mmol/L    Chloride 93 (L) 95 - 110 mmol/L    CO2 15 (L) 23 - 29 mmol/L    Glucose 100 70 - 110 mg/dL    BUN 88 (H) 6 - 20 mg/dL    Creatinine 4.8 (H) 0.5 - 1.4 mg/dL    Calcium 10.0 8.7 - 10.5 mg/dL    Total Protein 8.3 6.0 - 8.4 g/dL    Albumin 3.6 3.5 - 5.2 g/dL    Total Bilirubin 0.4 0.1 - 1.0 mg/dL    Alkaline Phosphatase 79 55 - 135 U/L    AST 59 (H) 10 - 40 U/L    ALT 31 10 - 44 U/L    Anion Gap 24 (H) 8 - 16 mmol/L    eGFR if African American 13 (A) >60 mL/min/1.73 m^2    eGFR if non African American 11 (A) >60 mL/min/1.73 m^2   Troponin I #1   Result Value Ref Range    Troponin I 0.022 0.000 - 0.026 ng/mL   BNP   Result Value Ref Range    BNP <10 0 - 99 pg/mL    Urinalysis, Reflex to Urine Culture Urine, Clean Catch    Specimen: Urine   Result Value Ref Range    Specimen UA Urine, Catheterized     Color, UA Yellow Yellow, Straw, Gloria    Appearance, UA Clear Clear    pH, UA 6.0 5.0 - 8.0    Specific Gravity, UA 1.010 1.005 - 1.030    Protein, UA Negative Negative    Glucose, UA Negative Negative    Ketones, UA Trace (A) Negative    Bilirubin (UA) 1+ (A) Negative    Occult Blood UA 1+ (A) Negative    Nitrite, UA Negative Negative    Urobilinogen, UA Negative <2.0 EU/dL    Leukocytes, UA Negative Negative   Drug screen panel, emergency   Result Value Ref Range    Benzodiazepines Presumptive Positive (A) Negative    Methadone metabolites Negative Negative    Cocaine (Metab.) Negative Negative    Opiate Scrn, Ur Negative Negative    Barbiturate Screen, Ur Negative Negative    Amphetamine Screen, Ur Negative Negative    THC Presumptive Positive (A) Negative    Phencyclidine Negative Negative    Creatinine, Urine 88.8 15.0 - 325.0 mg/dL    Toxicology Information SEE COMMENT    Occult blood x 1, stool    Specimen: Stool   Result Value Ref Range    Occult Blood Positive (A) Negative   Urinalysis Microscopic   Result Value Ref Range    RBC, UA 2 0 - 4 /hpf    Microscopic Comment SEE COMMENT    POCT COVID-19 Rapid Screening   Result Value Ref Range    POC Rapid COVID Negative Negative     Acceptable Yes    Type & Screen   Result Value Ref Range    Group & Rh O POS     Indirect Miguel Ángel NEG         Significant Imaging:   Imaging Results              X-Ray Chest AP Portable (Final result)  Result time 06/06/22 17:21:21      Final result by Shadi Aguilar MD (06/06/22 17:21:21)                   Impression:      No acute abnormality.      Electronically signed by: Jeff Hsu  Date:    06/06/2022  Time:    17:21               Narrative:    EXAMINATION:  XR CHEST AP PORTABLE    CLINICAL HISTORY:  Chest Pain;    TECHNIQUE:  Single frontal view of the chest was  performed.    COMPARISON:  None    FINDINGS:  Mild patient rotation.The lungs are clear, with normal appearance of pulmonary vasculature and no pleural effusion or pneumothorax.    The cardiac silhouette is normal in size. The hilar and mediastinal contours are unremarkable.    Bones are intact.                                       Assessment/Plan:     * Dehydration  Due to GI losses   IVFs   Antiemetics prn       Melena  x2 episodes of melena   H/H stable   Monitor CBC   IV Protonix   CT abdomen pending   GI consult pending clinical course       Debility  Hx cerebral palsy   PT/OT   Turn q2 hours         Gastroparesis  Likely due to cerebral palsy/THC?   Antiemetics  prn         Acute renal failure superimposed on stage 4 chronic kidney disease  Creatinine 4.8 (2.8 - 2 weeks ago)   IVFs   Follow Bmp   Nephrology consult pending clinical course     HTN (hypertension)  Resume home meds   Bp stable       Major depression  Resume zoloft         VTE Risk Mitigation (From admission, onward)         Ordered     Place SAHIL hose  Until discontinued         06/06/22 2209     IP VTE HIGH RISK PATIENT  Once         06/06/22 2209     Place sequential compression device  Until discontinued         06/06/22 2209     Place sequential compression device  Until discontinued         06/06/22 2209                   Jolene Weir NP  Department of Hospital Medicine   [facility] - Telemetry ([facility])

## 2022-06-07 NOTE — TELEPHONE ENCOUNTER
Called and spoke to patient mother information given regarding HH referral. She voiced understanding.

## 2022-06-07 NOTE — PLAN OF CARE
Pt oriented x4 VSS  Pt remained afebile throughout this shift  All meds administered per order.  Pt remianed free of falls  Pt c/o pain this shift. Pain meds administered as ordered.  Plan of care reviewed. pt verbalizes understanding.  Patient moving/ turning Q2H with assist.  Patient normal sinus on monitor  Bed low, side rails up x2, wheels locked, call light in reach.  Pt instructed to call for assistance  Hourly rounding completed.

## 2022-06-07 NOTE — PLAN OF CARE
Pt NSR on the heart monitor. On room air; tolerating well. Purewick in place. BM x1 with dark stool; NP notified. Pt turned and repositioned with use of pillows and wedge. 20 G PIV in left AC CDI with no redness, swelling, warmth, or drainage.  utilized. Bed low, wheels locked, alarms audible. Plan of care reviewed. Vital signs monitored. Fall precautions in place. Pain assessed. Safety promoted. Infection risks reduced.

## 2022-06-07 NOTE — PLAN OF CARE
[facility] - Telemetry (Hospital)  Initial Discharge Assessment       Primary Care Provider: Bri Garnica MD    Admission Diagnosis: Chest pain [R07.9]  Gastrointestinal hemorrhage, unspecified gastrointestinal hemorrhage type [K92.2]  Nausea and vomiting, intractability of vomiting not specified, unspecified vomiting type [R11.2]  Acute renal failure superimposed on stage 4 chronic kidney disease, unspecified acute renal failure type [N17.9, N18.4]    Admission Date: 6/6/2022  Expected Discharge Date:     Discharge Barriers Identified: None    Payor: MEDICAID / Plan: [facility] COMMUNITY PLAN [facility] ([place] MEDICAID) / Product Type: Managed Medicaid /     Extended Emergency Contact Information  Primary Emergency Contact: Neil Roquella  Address: [address] [address] [place]  Mobile Phone: [phone]  Relation: Grandparent  Secondary Emergency Contact: Aries Roque   [place]  Mobile Phone: [phone]  Relation: Relative  Mother: Nette Domingo  Address: [address] [place] [address] [place] [address] [facility]  Home Phone: [phone]  Mobile Phone: [phone]    Discharge Plan A: Home, Home with family         [facility] [place], [place] - [address] [facility]  [address] [facility] [address]  Phone: [phone] Fax: [phone]    [facility] [address] [place] [address] [facility] [address] [address] [facility] [address]  [facility] [address]  Phone: [phone] Fax: [phone]    [id number], [place] - [address] AT [facility]  [address]  [facility] [address]  Phone: [phone] Fax: [phone]      Initial Assessment (most recent)     Adult Discharge Assessment - 06/07/22 1406        Discharge Assessment    Assessment Type Discharge Planning Assessment     Confirmed/corrected address, phone number and insurance Yes     Confirmed Demographics Correct on Facesheet      Source of Information patient     Communicated MARY KATE with patient/caregiver Date not available/Unable to determine     Reason For Admission Dehydration     Lives With grandparent(s)     Facility Arrived From: Home     Do you expect to return to your current living situation? Yes     Do you have help at home or someone to help you manage your care at home? Yes     Who are your caregiver(s) and their phone number(s)? Pt's mother     Prior to hospitilization cognitive status: Alert/Oriented     Current cognitive status: Alert/Oriented     Walking or Climbing Stairs Difficulty none     Dressing/Bathing Difficulty none     Equipment Currently Used at Home none     Readmission within 30 days? No     Patient currently being followed by outpatient case management? No     Do you currently have service(s) that help you manage your care at home? No     Do you take prescription medications? Yes     Do you have prescription coverage? Yes     Do you have any problems affording any of your prescribed medications? No     Is the patient taking medications as prescribed? yes     Who is going to help you get home at discharge? Pt's mother     How do you get to doctors appointments? family or friend will provide     Are you on dialysis? No     Do you take coumadin? No     Discharge Plan A Home;Home with family     DME Needed Upon Discharge  other (see comments)   TBD    Discharge Plan discussed with: Patient;Parent(s)     Discharge Barriers Identified None               Zhannar met with patient and mother at the bedside to complete discharge assessment. , Berta, assisted with interpretation. Patient's mother responded to most assessment questions.    Patient live at home with her grandmother, Lisa. Pt's mother states that patient was previously independent with ADLs but has been requiring more assistance d/t weakness. Patient does not have any DME currently in use. Pt's mother reports owning a bath bench, walker  and BSC but clarified that patient is not using any of these devices at the moment. Patient's help at home and discharge transportation will be provided by her mother.     Patient's mother inquired about resources for additional supports in the home. Patient's mother states that she's been working to get pt a  with Medicaid. Swer offered to provide Medicaid waiver programs for additional assistance. Pt's mother inquired about home health. Swer discussed home health challenges with certain Medicaid programs. Pt's mother verbalized understanding. Swer provided family with Medicaid Community Choice Waiver Program.     Swer updated patient's whiteboard to reflect discharge disposition and SW contact information. No additional questions/concerns at this time.

## 2022-06-07 NOTE — ASSESSMENT & PLAN NOTE
x2 episodes of melena   H/H stable   Monitor CBC   IV Protonix   CT abdomen pending   GI consult pending clinical course   6/7/22 The patient went for a CT ABD/pelvis which showed no acute abnormality. The patient reports black stool recently but none since admission. H/H today is 9.7/30.1. Will continue to monitor H/H and will consult GI for any further drop in H/H or further melena. Continue current management.

## 2022-06-07 NOTE — HOSPITAL COURSE
6/7/22 No acute events overnight. The patient went for a CT ABD/pelvis which showed no acute abnormality. JESSICA is improved overnight with IV hydraation. Will continue IV fluids and monitor renal function. The patient reports blsck stool recently but none since admission. H/H today is 9.7/30.1. Will continue to monitor H/H and will consult GI for any further drop in H/H or further melena. Continue current management.     As of 6/8/2022, vital signs and labs stable. H/H trending up 10.1/31.9 today, Pt and mother reports decrease strength over last 2 weeks. Neurology consulted. JESSICA improved, however she continue to experience metabolic acidosis. Nephrology consulted. PT/OT recommend SNF placement, however patient and family refuses. Will follow.     As of 6/9/2021, Nephrology and Neurology recommendations noted.  PT/OT recommmend SNF placement, however patient and family. GI consulted for dysphagia in the setting of resetting of dehydration, recommend esophagram. Radiology unable to do Esophagram today, plan for procedure on tomorrow. As of 6/10/22 renal function stable. Esophagram showed no evidence of a stricture or obstruction. Dr. Charles at bedside discussing the findings and treatment course. Vital signs stable. Labs stable. Patient seen and examined on the date of discharge and found stable for discharge. Patient to follow-up with her PCP in 3 days for hospital follow-up.

## 2022-06-07 NOTE — SUBJECTIVE & OBJECTIVE
Past Medical History:   Diagnosis Date    Cerebral palsy     Hearing impaired person     Hypertension     Osteopenia determined by x-ray     Oswaldo syndrome        Past Surgical History:   Procedure Laterality Date    CHOLECYSTECTOMY      INNER EAR SURGERY      RENAL BIOPSY  07/07/14       Review of patient's allergies indicates:   Allergen Reactions    Ace inhibitors Other (See Comments)    Nabumetone Other (See Comments)    Nsaids (non-steroidal anti-inflammatory drug) Other (See Comments)    Sulfa (sulfonamide antibiotics) Hives    Pcn  [penicillins] Rash       No current facility-administered medications on file prior to encounter.     Current Outpatient Medications on File Prior to Encounter   Medication Sig    alendronate (FOSAMAX) 70 MG tablet Take 1 tablet (70 mg total) by mouth every 7 days.    amlodipine (NORVASC) 10 MG tablet Take 10 mg by mouth once daily.    carvedilol (COREG) 6.25 MG tablet 12.5 mg.     clonazePAM (KLONOPIN) 1 MG tablet Take 1 tablet (1 mg total) by mouth daily as needed for Anxiety.    ergocalciferol (ERGOCALCIFEROL) 50,000 unit Cap Vitamin D2 50,000 unit capsule    estrogen, conjugated,-medroxyprogesterone 0.625-2.5mg (PREMPRO) 0.625-2.5 mg per tablet Take 1 tablet by mouth once daily.    famotidine (PEPCID) 20 MG tablet TAKE 1 TABLET PO TWICE A DAY    famotidine (PEPCID) 20 MG tablet Take 1 tablet (20 mg total) by mouth 2 (two) times daily.    ondansetron (ZOFRAN-ODT) 4 MG TbDL Take 1 tablet (4 mg total) by mouth every 8 (eight) hours as needed (nausea).    pantoprazole (PROTONIX) 40 MG tablet TK 1 T PO  D    sertraline (ZOLOFT) 100 MG tablet     traZODone (DESYREL) 50 MG tablet     [DISCONTINUED] losartan (COZAAR) 50 MG tablet losartan 50 mg tablet     Family History    None       Tobacco Use    Smoking status: Former Smoker     Packs/day: 0.25     Years: 9.00     Pack years: 2.25    Smokeless tobacco: Never Used   Substance and Sexual Activity    Alcohol use: Yes     Comment:  Social    Drug use: Yes     Types: Marijuana    Sexual activity: Yes     Partners: Male     Birth control/protection: Inserts     Comment: Single.     Review of Systems   Constitutional:  Positive for activity change and appetite change.   HENT: Negative.     Eyes: Negative.    Respiratory: Negative.     Cardiovascular: Negative.    Gastrointestinal:  Positive for nausea and vomiting.        Dark stools    Endocrine: Negative.    Genitourinary: Negative.    Musculoskeletal: Negative.    Skin: Negative.    Allergic/Immunologic: Negative.    Neurological:  Positive for dizziness, weakness and light-headedness.   Hematological: Negative.    Psychiatric/Behavioral: Negative.     Objective:     Vital Signs (Most Recent):  Temp: 98 °F (36.7 °C) (06/06/22 2154)  Pulse: 100 (06/06/22 2154)  Resp: 17 (06/06/22 2154)  BP: (!) 140/98 (06/06/22 2154)  SpO2: 100 % (06/06/22 2154)   Vital Signs (24h Range):  Temp:  [98 °F (36.7 °C)-98.1 °F (36.7 °C)] 98 °F (36.7 °C)  Pulse:  [] 100  Resp:  [13-21] 17  SpO2:  [98 %-100 %] 100 %  BP: ()/() 140/98     Weight: 89 kg (196 lb 3.4 oz)  Body mass index is 34.76 kg/m².    Physical Exam  Vitals and nursing note reviewed.   Constitutional:       Appearance: She is well-developed. She is ill-appearing.      Comments: Pt Deaf    HENT:      Head: Normocephalic and atraumatic.   Eyes:      Conjunctiva/sclera: Conjunctivae normal.      Pupils: Pupils are equal, round, and reactive to light.   Cardiovascular:      Rate and Rhythm: Normal rate and regular rhythm.      Heart sounds: Normal heart sounds.   Pulmonary:      Effort: Pulmonary effort is normal.      Breath sounds: Normal breath sounds.   Abdominal:      General: Bowel sounds are normal.      Palpations: Abdomen is soft.      Tenderness: There is abdominal tenderness (mild).   Musculoskeletal:         General: Normal range of motion.      Cervical back: Normal range of motion and neck supple.      Comments: Muscle  wasting of lower extremities   Skin:     General: Skin is warm and dry.   Neurological:      Mental Status: She is alert and oriented to person, place, and time.      Deep Tendon Reflexes: Reflexes are normal and symmetric.   Psychiatric:         Behavior: Behavior normal.         Thought Content: Thought content normal.         Judgment: Judgment normal.     Significant Labs:   Results for orders placed or performed during the hospital encounter of 06/06/22   CBC auto differential   Result Value Ref Range    WBC 11.32 3.90 - 12.70 K/uL    RBC 3.60 (L) 4.00 - 5.40 M/uL    Hemoglobin 11.4 (L) 12.0 - 16.0 g/dL    Hematocrit 34.1 (L) 37.0 - 48.5 %    MCV 95 82 - 98 fL    MCH 31.7 (H) 27.0 - 31.0 pg    MCHC 33.4 32.0 - 36.0 g/dL    RDW 13.4 11.5 - 14.5 %    Platelets 214 150 - 450 K/uL    MPV 9.7 9.2 - 12.9 fL    Immature Granulocytes 0.9 (H) 0.0 - 0.5 %    Gran # (ANC) 9.6 (H) 1.8 - 7.7 K/uL    Immature Grans (Abs) 0.10 (H) 0.00 - 0.04 K/uL    Lymph # 1.0 1.0 - 4.8 K/uL    Mono # 0.6 0.3 - 1.0 K/uL    Eos # 0.0 0.0 - 0.5 K/uL    Baso # 0.02 0.00 - 0.20 K/uL    nRBC 0 0 /100 WBC    Gran % 84.5 (H) 38.0 - 73.0 %    Lymph % 8.8 (L) 18.0 - 48.0 %    Mono % 5.4 4.0 - 15.0 %    Eosinophil % 0.2 0.0 - 8.0 %    Basophil % 0.2 0.0 - 1.9 %    Differential Method Automated    Comprehensive metabolic panel   Result Value Ref Range    Sodium 132 (L) 136 - 145 mmol/L    Potassium 4.3 3.5 - 5.1 mmol/L    Chloride 93 (L) 95 - 110 mmol/L    CO2 15 (L) 23 - 29 mmol/L    Glucose 100 70 - 110 mg/dL    BUN 88 (H) 6 - 20 mg/dL    Creatinine 4.8 (H) 0.5 - 1.4 mg/dL    Calcium 10.0 8.7 - 10.5 mg/dL    Total Protein 8.3 6.0 - 8.4 g/dL    Albumin 3.6 3.5 - 5.2 g/dL    Total Bilirubin 0.4 0.1 - 1.0 mg/dL    Alkaline Phosphatase 79 55 - 135 U/L    AST 59 (H) 10 - 40 U/L    ALT 31 10 - 44 U/L    Anion Gap 24 (H) 8 - 16 mmol/L    eGFR if African American 13 (A) >60 mL/min/1.73 m^2    eGFR if non African American 11 (A) >60 mL/min/1.73 m^2    Troponin I #1   Result Value Ref Range    Troponin I 0.022 0.000 - 0.026 ng/mL   BNP   Result Value Ref Range    BNP <10 0 - 99 pg/mL   Urinalysis, Reflex to Urine Culture Urine, Clean Catch    Specimen: Urine   Result Value Ref Range    Specimen UA Urine, Catheterized     Color, UA Yellow Yellow, Straw, Gloria    Appearance, UA Clear Clear    pH, UA 6.0 5.0 - 8.0    Specific Gravity, UA 1.010 1.005 - 1.030    Protein, UA Negative Negative    Glucose, UA Negative Negative    Ketones, UA Trace (A) Negative    Bilirubin (UA) 1+ (A) Negative    Occult Blood UA 1+ (A) Negative    Nitrite, UA Negative Negative    Urobilinogen, UA Negative <2.0 EU/dL    Leukocytes, UA Negative Negative   Drug screen panel, emergency   Result Value Ref Range    Benzodiazepines Presumptive Positive (A) Negative    Methadone metabolites Negative Negative    Cocaine (Metab.) Negative Negative    Opiate Scrn, Ur Negative Negative    Barbiturate Screen, Ur Negative Negative    Amphetamine Screen, Ur Negative Negative    THC Presumptive Positive (A) Negative    Phencyclidine Negative Negative    Creatinine, Urine 88.8 15.0 - 325.0 mg/dL    Toxicology Information SEE COMMENT    Occult blood x 1, stool    Specimen: Stool   Result Value Ref Range    Occult Blood Positive (A) Negative   Urinalysis Microscopic   Result Value Ref Range    RBC, UA 2 0 - 4 /hpf    Microscopic Comment SEE COMMENT    POCT COVID-19 Rapid Screening   Result Value Ref Range    POC Rapid COVID Negative Negative     Acceptable Yes    Type & Screen   Result Value Ref Range    Group & Rh O POS     Indirect Miguel Ángel NEG         Significant Imaging:   Imaging Results              X-Ray Chest AP Portable (Final result)  Result time 06/06/22 17:21:21      Final result by Shadi Aguilar MD (06/06/22 17:21:21)                   Impression:      No acute abnormality.      Electronically signed by: Jeff Hsu  Date:    06/06/2022  Time:    17:21               Narrative:     EXAMINATION:  XR CHEST AP PORTABLE    CLINICAL HISTORY:  Chest Pain;    TECHNIQUE:  Single frontal view of the chest was performed.    COMPARISON:  None    FINDINGS:  Mild patient rotation.The lungs are clear, with normal appearance of pulmonary vasculature and no pleural effusion or pneumothorax.    The cardiac silhouette is normal in size. The hilar and mediastinal contours are unremarkable.    Bones are intact.

## 2022-06-07 NOTE — PT/OT/SLP EVAL
Physical Therapy Evaluation    Patient Name:  Francis Bains   MRN:  [id number]    Recommendations:     Discharge Recommendations:  nursing facility, skilled   Discharge Equipment Recommendations: other (see comments) (TBD at next level of care)   Barriers to discharge: None    Assessment:     Francis Bains is a 32 y.o. female admitted with a medical diagnosis of Dehydration.  She presents with the following impairments/functional limitations:  weakness, impaired endurance, decreased coordination, decreased upper extremity function, impaired self care skills, decreased lower extremity function, impaired functional mobilty, decreased safety awareness, gait instability, decreased ROM, impaired balance.    Rehab Prognosis: Fair; patient would benefit from acute skilled PT services to address these deficits and reach maximum level of function.    Recent Surgery: * No surgery found *      Plan:     During this hospitalization, patient to be seen 3 x/week to address the identified rehab impairments via gait training, therapeutic activities, therapeutic exercises and progress toward the following goals:    · Plan of Care Expires:  06/21/22    Subjective     Chief Complaint: Pain all over her body.  Patient/Family Comments/goals: Get stronger.   Pain/Comfort:  · Pain Rating 1: 10/10  · Location - Side 1: Bilateral  · Location - Orientation 1: generalized  · Pain Addressed 1: Reposition, Distraction, Cessation of Activity  · Pain Rating Post-Intervention 1: 10/10    Patients cultural, spiritual, Anglican conflicts given the current situation: no    Living Environment:  Patient has recently been staying at her mothers home following a recent hospitalization. Since that hospitalization, patient has been unable to get out of bed and has been experiencing pain all over, per mother. Usually patient ambulates with no AD and is independent with ADLs.  Equipment used at home: walker, rolling, shower chair.  DME owned (not  currently used): rolling walker.  Upon discharge, patient will have assistance from mother.    Objective:     Communicated with CHARITY Mix prior to session.  Patient found supine with telemetry, peripheral IV, PureWick  upon PT entry to room.    General Precautions: Standard, fall   Orthopedic Precautions:N/A   Braces: N/A  Respiratory Status: Room air    Exams:  · RLE ROM: WFL  · RLE Strength: unable to test as patient reports pain in bilateral legs  · LLE ROM: WFL  · LLE Strength: unable to test as patient reports pain in bilateral legs    Functional Mobility:  · Bed Mobility:     · Supine to Sit: total assistance  · Sit to Supine: total assistance    Therapeutic Activities and Exercises:     Patient found supine in bed upon PT entrance into room. PT provided education on role of PT and POC. Patient is deaf and required hearing aides to be placed in order for patient to understand PT. Patient resistant to therapy eval and required encouragement to participate. Patient resistant due to overall body aches and pain. PT provided multiple verbal cues and tactile cues for completing sup<>sit transfer but patient was Tot A. Once seated EOB patient was Max A to maintain seated balance.     AM-PAC 6 CLICK MOBILITY  Total Score:8     Patient left supine with call button in reach.    GOALS:   Multidisciplinary Problems     Physical Therapy Goals        Problem: Physical Therapy    Goal Priority Disciplines Outcome Goal Variances Interventions   Physical Therapy Goal     PT, PT/OT                      History:     Past Medical History:   Diagnosis Date    Cerebral palsy     Hearing impaired person     Hypertension     Osteopenia determined by x-ray     Oswaldo syndrome        Past Surgical History:   Procedure Laterality Date    CHOLECYSTECTOMY      INNER EAR SURGERY      RENAL BIOPSY  07/07/14       Time Tracking:     PT Received On: 06/07/22  PT Start Time: 0800     PT Stop Time: 0825  PT Total Time (min): 25 min      Billable Minutes: Evaluation 13 and Therapeutic Activity 12      06/07/2022

## 2022-06-07 NOTE — ASSESSMENT & PLAN NOTE
Creatinine 4.8 (2.8 - 2 weeks ago)   IVFs   Follow Bmp   Nephrology consult pending clinical course

## 2022-06-07 NOTE — ASSESSMENT & PLAN NOTE
Due to GI losses   IVFs   Antiemetics prn   6/7/22 The patient went for a CT ABD/pelvis which showed no acute abnormality. JESSICA is improved overnight with IV hydration. Will continue IV fluids and monitor renal function.

## 2022-06-07 NOTE — ED NOTES
Pt is a/o x 4. Pt is deaf. Parents and  at bedside. Pt reports black stool for the last few weeks along with generalized weakness and decreased mobility. Pt was able to ambulate up until about 2 weeks ago; she is currently unable to stand up and walk. Pt also reports pain and sensitivity to her skin. Pt has hx of kidney disease and parents report she has only been urinating 2-3 x a day. Pt denies n/v, dizziness, chest pain, sob.

## 2022-06-07 NOTE — SUBJECTIVE & OBJECTIVE
Interval History: No acute events overnight. The patient went for a CT ABD/pelvis which showed no acute abnormality. JESSICA is improved overnight with IV hydraation. Will continue IV fluids and monitor renal function. The patient reports blsck stool recently but none since admission. H/H today is 9.7/30.1. Will continue to monitor H/H and will consult GI for any further drop in H/H or further melena. Continue current management.     Review of Systems   Constitutional:  Positive for activity change and appetite change. Negative for chills, diaphoresis, fatigue, fever and unexpected weight change.   HENT: Negative.  Negative for congestion, drooling, facial swelling, rhinorrhea, sinus pressure, sneezing and sore throat.    Eyes: Negative.  Negative for discharge, redness, itching and visual disturbance.   Respiratory: Negative.  Negative for apnea, cough, choking, chest tightness, shortness of breath, wheezing and stridor.    Cardiovascular: Negative.  Negative for chest pain, palpitations and leg swelling.   Gastrointestinal:  Positive for nausea and vomiting. Negative for abdominal distention, abdominal pain, anal bleeding, blood in stool, constipation and diarrhea.        Dark stools    Endocrine: Negative.    Genitourinary: Negative.  Negative for decreased urine volume, difficulty urinating, dysuria, frequency, hematuria, pelvic pain, urgency, vaginal bleeding and vaginal discharge.   Musculoskeletal: Negative.  Negative for arthralgias, back pain, gait problem, joint swelling, myalgias, neck pain and neck stiffness.   Skin: Negative.  Negative for color change, pallor, rash and wound.   Allergic/Immunologic: Negative.    Neurological:  Positive for dizziness, weakness and light-headedness. Negative for seizures, facial asymmetry, speech difficulty, numbness and headaches.   Hematological: Negative.    Psychiatric/Behavioral: Negative.  Negative for agitation, confusion, hallucinations and suicidal ideas.    All  other systems reviewed and are negative.  Objective:     Vital Signs (Most Recent):  Temp: 98.3 °F (36.8 °C) (06/07/22 1143)  Pulse: 92 (06/07/22 1143)  Resp: 18 (06/07/22 1255)  BP: (!) 138/92 (06/07/22 1159)  SpO2: 99 % (06/07/22 1143)   Vital Signs (24h Range):  Temp:  [97.4 °F (36.3 °C)-98.3 °F (36.8 °C)] 98.3 °F (36.8 °C)  Pulse:  [] 92  Resp:  [13-21] 18  SpO2:  [99 %-100 %] 99 %  BP: ()/() 138/92     Weight: 89 kg (196 lb 3.4 oz)  Body mass index is 34.76 kg/m².    Intake/Output Summary (Last 24 hours) at 6/7/2022 1459  Last data filed at 6/7/2022 0800  Gross per 24 hour   Intake 1568.12 ml   Output 1200 ml   Net 368.12 ml      Physical Exam  Vitals and nursing note reviewed.   Constitutional:       Appearance: She is well-developed. She is ill-appearing.      Comments: Pt Deaf    HENT:      Head: Normocephalic and atraumatic.   Eyes:      Conjunctiva/sclera: Conjunctivae normal.      Pupils: Pupils are equal, round, and reactive to light.   Cardiovascular:      Rate and Rhythm: Normal rate and regular rhythm.      Heart sounds: Normal heart sounds. No murmur heard.  Pulmonary:      Effort: Pulmonary effort is normal. No respiratory distress.      Breath sounds: Normal breath sounds.   Abdominal:      General: Bowel sounds are normal. There is no distension.      Palpations: Abdomen is soft.      Tenderness: There is abdominal tenderness (mild).   Musculoskeletal:         General: No tenderness or deformity. Normal range of motion.      Cervical back: Normal range of motion and neck supple.      Comments: Muscle wasting of lower extremities   Skin:     General: Skin is warm and dry.      Findings: No erythema.   Neurological:      Mental Status: She is alert and oriented to person, place, and time.      Deep Tendon Reflexes: Reflexes are normal and symmetric.   Psychiatric:         Behavior: Behavior normal.         Thought Content: Thought content normal.         Judgment: Judgment normal.        Significant Labs: All pertinent labs within the past 24 hours have been reviewed.    Significant Imaging:   Imaging Results              X-Ray Chest AP Portable (Final result)  Result time 06/06/22 17:21:21      Final result by Shadi Aguilar MD (06/06/22 17:21:21)                   Impression:      No acute abnormality.      Electronically signed by: Jeff Hsu  Date:    06/06/2022  Time:    17:21               Narrative:    EXAMINATION:  XR CHEST AP PORTABLE    CLINICAL HISTORY:  Chest Pain;    TECHNIQUE:  Single frontal view of the chest was performed.    COMPARISON:  None    FINDINGS:  Mild patient rotation.The lungs are clear, with normal appearance of pulmonary vasculature and no pleural effusion or pneumothorax.    The cardiac silhouette is normal in size. The hilar and mediastinal contours are unremarkable.    Bones are intact.

## 2022-06-08 LAB
ANION GAP SERPL CALC-SCNC: 14 MMOL/L (ref 8–16)
BACTERIA #/AREA URNS HPF: ABNORMAL /HPF
BASOPHILS # BLD AUTO: 0.02 K/UL (ref 0–0.2)
BASOPHILS NFR BLD: 0.2 % (ref 0–1.9)
BILIRUB UR QL STRIP: NEGATIVE
BUN SERPL-MCNC: 60 MG/DL (ref 6–20)
CALCIUM SERPL-MCNC: 9.1 MG/DL (ref 8.7–10.5)
CHLORIDE SERPL-SCNC: 106 MMOL/L (ref 95–110)
CLARITY UR: CLEAR
CO2 SERPL-SCNC: 18 MMOL/L (ref 23–29)
COLOR UR: YELLOW
CREAT SERPL-MCNC: 2.7 MG/DL (ref 0.5–1.4)
CRP SERPL-MCNC: 14.5 MG/L (ref 0–8.2)
DIFFERENTIAL METHOD: ABNORMAL
EOSINOPHIL # BLD AUTO: 0.1 K/UL (ref 0–0.5)
EOSINOPHIL NFR BLD: 0.7 % (ref 0–8)
ERYTHROCYTE [DISTWIDTH] IN BLOOD BY AUTOMATED COUNT: 13.5 % (ref 11.5–14.5)
ERYTHROCYTE [SEDIMENTATION RATE] IN BLOOD BY WESTERGREN METHOD: 68 MM/HR (ref 0–20)
EST. GFR  (AFRICAN AMERICAN): 26 ML/MIN/1.73 M^2
EST. GFR  (NON AFRICAN AMERICAN): 22 ML/MIN/1.73 M^2
GLUCOSE SERPL-MCNC: 133 MG/DL (ref 70–110)
GLUCOSE UR QL STRIP: NEGATIVE
HCT VFR BLD AUTO: 31.9 % (ref 37–48.5)
HGB BLD-MCNC: 10.1 G/DL (ref 12–16)
HGB UR QL STRIP: ABNORMAL
IMM GRANULOCYTES # BLD AUTO: 0.03 K/UL (ref 0–0.04)
IMM GRANULOCYTES NFR BLD AUTO: 0.4 % (ref 0–0.5)
KETONES UR QL STRIP: NEGATIVE
LEUKOCYTE ESTERASE UR QL STRIP: ABNORMAL
LYMPHOCYTES # BLD AUTO: 0.8 K/UL (ref 1–4.8)
LYMPHOCYTES NFR BLD: 9.5 % (ref 18–48)
MCH RBC QN AUTO: 31.9 PG (ref 27–31)
MCHC RBC AUTO-ENTMCNC: 31.7 G/DL (ref 32–36)
MCV RBC AUTO: 101 FL (ref 82–98)
MICROSCOPIC COMMENT: ABNORMAL
MONOCYTES # BLD AUTO: 0.7 K/UL (ref 0.3–1)
MONOCYTES NFR BLD: 8.1 % (ref 4–15)
NEUTROPHILS # BLD AUTO: 6.8 K/UL (ref 1.8–7.7)
NEUTROPHILS NFR BLD: 81.1 % (ref 38–73)
NITRITE UR QL STRIP: NEGATIVE
NRBC BLD-RTO: 0 /100 WBC
PH UR STRIP: 6 [PH] (ref 5–8)
PLATELET # BLD AUTO: 164 K/UL (ref 150–450)
PMV BLD AUTO: 9.6 FL (ref 9.2–12.9)
POTASSIUM SERPL-SCNC: 3.8 MMOL/L (ref 3.5–5.1)
PROT UR QL STRIP: NEGATIVE
RBC # BLD AUTO: 3.17 M/UL (ref 4–5.4)
RBC #/AREA URNS HPF: 25 /HPF (ref 0–4)
SODIUM SERPL-SCNC: 138 MMOL/L (ref 136–145)
SP GR UR STRIP: 1.01 (ref 1–1.03)
SQUAMOUS #/AREA URNS HPF: 2 /HPF
URN SPEC COLLECT METH UR: ABNORMAL
UROBILINOGEN UR STRIP-ACNC: NEGATIVE EU/DL
WBC # BLD AUTO: 8.42 K/UL (ref 3.9–12.7)
WBC #/AREA URNS HPF: 3 /HPF (ref 0–5)

## 2022-06-08 PROCEDURE — 85025 COMPLETE CBC W/AUTO DIFF WBC: CPT | Performed by: NURSE PRACTITIONER

## 2022-06-08 PROCEDURE — 99222 1ST HOSP IP/OBS MODERATE 55: CPT | Mod: 95,,, | Performed by: PSYCHIATRY & NEUROLOGY

## 2022-06-08 PROCEDURE — 25000003 PHARM REV CODE 250: Performed by: NURSE PRACTITIONER

## 2022-06-08 PROCEDURE — 25000003 PHARM REV CODE 250: Performed by: EMERGENCY MEDICINE

## 2022-06-08 PROCEDURE — 85651 RBC SED RATE NONAUTOMATED: CPT | Performed by: NURSE PRACTITIONER

## 2022-06-08 PROCEDURE — 63600175 PHARM REV CODE 636 W HCPCS: Performed by: NURSE PRACTITIONER

## 2022-06-08 PROCEDURE — 99223 PR INITIAL HOSPITAL CARE,LEVL III: ICD-10-PCS | Mod: ,,, | Performed by: INTERNAL MEDICINE

## 2022-06-08 PROCEDURE — 80048 BASIC METABOLIC PNL TOTAL CA: CPT | Performed by: NURSE PRACTITIONER

## 2022-06-08 PROCEDURE — 97112 NEUROMUSCULAR REEDUCATION: CPT | Mod: CQ

## 2022-06-08 PROCEDURE — 97530 THERAPEUTIC ACTIVITIES: CPT | Mod: CQ

## 2022-06-08 PROCEDURE — 81000 URINALYSIS NONAUTO W/SCOPE: CPT | Performed by: NURSE PRACTITIONER

## 2022-06-08 PROCEDURE — 21400001 HC TELEMETRY ROOM

## 2022-06-08 PROCEDURE — 86140 C-REACTIVE PROTEIN: CPT | Performed by: NURSE PRACTITIONER

## 2022-06-08 PROCEDURE — 63600175 PHARM REV CODE 636 W HCPCS: Performed by: EMERGENCY MEDICINE

## 2022-06-08 PROCEDURE — 99222 PR INITIAL HOSPITAL CARE,LEVL II: ICD-10-PCS | Mod: 95,,, | Performed by: PSYCHIATRY & NEUROLOGY

## 2022-06-08 PROCEDURE — C9113 INJ PANTOPRAZOLE SODIUM, VIA: HCPCS | Performed by: NURSE PRACTITIONER

## 2022-06-08 PROCEDURE — 92610 EVALUATE SWALLOWING FUNCTION: CPT

## 2022-06-08 PROCEDURE — 36415 COLL VENOUS BLD VENIPUNCTURE: CPT | Performed by: NURSE PRACTITIONER

## 2022-06-08 PROCEDURE — 99223 1ST HOSP IP/OBS HIGH 75: CPT | Mod: ,,, | Performed by: INTERNAL MEDICINE

## 2022-06-08 RX ADMIN — HYDROCODONE BITARTRATE AND ACETAMINOPHEN 1 TABLET: 10; 325 TABLET ORAL at 08:06

## 2022-06-08 RX ADMIN — CARVEDILOL 6.25 MG: 6.25 TABLET, FILM COATED ORAL at 08:06

## 2022-06-08 RX ADMIN — PANTOPRAZOLE SODIUM 40 MG: 40 INJECTION, POWDER, FOR SOLUTION INTRAVENOUS at 08:06

## 2022-06-08 RX ADMIN — HYDROCODONE BITARTRATE AND ACETAMINOPHEN 1 TABLET: 10; 325 TABLET ORAL at 12:06

## 2022-06-08 RX ADMIN — TRAZODONE HYDROCHLORIDE 50 MG: 50 TABLET ORAL at 08:06

## 2022-06-08 RX ADMIN — FOLIC ACID: 5 INJECTION, SOLUTION INTRAMUSCULAR; INTRAVENOUS; SUBCUTANEOUS at 03:06

## 2022-06-08 RX ADMIN — SERTRALINE HYDROCHLORIDE 50 MG: 50 TABLET ORAL at 08:06

## 2022-06-08 RX ADMIN — HYDROCODONE BITARTRATE AND ACETAMINOPHEN 1 TABLET: 10; 325 TABLET ORAL at 05:06

## 2022-06-08 RX ADMIN — HYDROCODONE BITARTRATE AND ACETAMINOPHEN 1 TABLET: 10; 325 TABLET ORAL at 03:06

## 2022-06-08 RX ADMIN — HYDROCODONE BITARTRATE AND ACETAMINOPHEN 1 TABLET: 10; 325 TABLET ORAL at 09:06

## 2022-06-08 RX ADMIN — ACETAMINOPHEN 650 MG: 325 TABLET ORAL at 08:06

## 2022-06-08 RX ADMIN — CLONAZEPAM 1 MG: 1 TABLET ORAL at 08:06

## 2022-06-08 NOTE — ASSESSMENT & PLAN NOTE
Creatinine 4.8 (2.8 - 2 weeks ago)   IVFs   Follow Bmp   Nephrology consult pending clinical course     6/8/2022  Creatinine trending downward, 2.7 today   Nephrology consulted for further evaluation of metabolic acidosis   Recomendations noted

## 2022-06-08 NOTE — PROGRESS NOTES
[facility] Medicine  Progress Note    Patient Name: Francis Bains  MRN: [id number]  Patient Class: IP- Inpatient   Admission Date: 6/6/2022  Length of Stay: 1 days  Attending Physician: Kali Charles MD  Primary Care Provider: Bri Garnica MD        Subjective:     Principal Problem:Dehydration        HPI:  Francis Bains is a 32 y.o. female patient with a PMHx of cerbral palsy, kidney disease, HTN, and ana maria syndrome who presents to the Emergency Department for evaluation of hypotension. Pt's father reports that pt was extremely weak and lightheaded 2 weeks ago and they went to the ED in [facility]. She was admitted to the hospital for 2 nights for severe dehydration and decreased kidney function. Pt was able to walk when she was first admitted, but has been unable to walk since being discharged. Pt's father reports that she has been getting weaker each day and is now barely able to move her arms. He also reports that pt has been getting sicker since February. Associated sxs include black stool, dizziness, nausea, vomiting, decreased appetite, and decreased urine. Patient denies any fever, dysuria, SOB, CP, and all other sxs at this time. In the ED pt was found to be hypotensive, chloride 93, CO2 15, Anion gap 24, BUN 88, Creatinine 4.8; UDS + for benzo and THC, + occult blood; CXR negative. Patient received 2L of NS in the ED. Patient placed in observation for dehydration and JESSICA under the care of hospital medicine. Of note pt mother reports pt has been leaving with her grandmother who has Munchausen syndrome.         Overview/Hospital Course:  6/7/22 No acute events overnight. The patient went for a CT ABD/pelvis which showed no acute abnormality. JESSICA is improved overnight with IV hydraation. Will continue IV fluids and monitor renal function. The patient reports blsck stool recently but none since admission. H/H today is 9.7/30.1. Will continue to monitor H/H and will  consult GI for any further drop in H/H or further melena. Continue current management.   As of 6/8/2022, vital signs and labs stable. H/H trending up 10.1/31.9 today, Pt and mother reports decrease strength over last 2 weeks. Neurology consulted. JESSICA improved, however she continue to experience metabolic acidosis. Nephrology consulted. PT/OT recommend SNF placement, however patient and family refuses. Will follow.       Interval History:     Review of Systems   Constitutional:  Positive for activity change. Negative for chills and fever.   HENT:  Positive for hearing loss and trouble swallowing. Negative for congestion, rhinorrhea and sinus pressure.    Respiratory:  Negative for apnea, cough, choking, chest tightness, shortness of breath, wheezing and stridor.    Cardiovascular:  Negative for chest pain, palpitations and leg swelling.   Gastrointestinal:  Negative for abdominal distention, abdominal pain, diarrhea, nausea and vomiting.   Endocrine: Negative for cold intolerance and heat intolerance.   Genitourinary:  Negative for difficulty urinating and hematuria.   Musculoskeletal:  Negative for arthralgias and joint swelling.   Skin:  Negative for color change, pallor and rash.   Neurological:  Positive for weakness. Negative for dizziness, seizures, numbness and headaches.   Psychiatric/Behavioral:  Negative for agitation. The patient is not nervous/anxious.    Objective:     Vital Signs (Most Recent):  Temp: 97.9 °F (36.6 °C) (06/08/22 1543)  Pulse: 97 (06/08/22 1543)  Resp: 18 (06/08/22 1543)  BP: (!) 126/102 (06/08/22 1543)  SpO2: 98 % (06/08/22 1543)   Vital Signs (24h Range):  Temp:  [97.6 °F (36.4 °C)-98.6 °F (37 °C)] 97.9 °F (36.6 °C)  Pulse:  [] 97  Resp:  [16-20] 18  SpO2:  [97 %-99 %] 98 %  BP: (123-138)/() 126/102     Weight: 89 kg (196 lb 3.4 oz)  Body mass index is 34.76 kg/m².    Intake/Output Summary (Last 24 hours) at 6/8/2022 1632  Last data filed at 6/8/2022 1436  Gross per 24 hour    Intake 951 ml   Output 2000 ml   Net -1049 ml      Physical Exam  Vitals and nursing note reviewed.   Constitutional:       General: She is not in acute distress.     Appearance: She is not ill-appearing, toxic-appearing or diaphoretic.   Eyes:      General:         Right eye: No discharge.         Left eye: No discharge.   Cardiovascular:      Heart sounds: No murmur heard.    No friction rub. No gallop.   Pulmonary:      Effort: No respiratory distress.      Breath sounds: No stridor. No wheezing, rhonchi or rales.   Chest:      Chest wall: No tenderness.   Abdominal:      General: There is no distension.      Palpations: There is no mass.      Tenderness: There is no abdominal tenderness. There is no right CVA tenderness, left CVA tenderness, guarding or rebound.      Hernia: No hernia is present.   Musculoskeletal:         General: No swelling, tenderness, deformity or signs of injury.      Right lower leg: No edema.      Left lower leg: No edema.   Skin:     Coloration: Skin is not jaundiced or pale.      Findings: No bruising, erythema, lesion or rash.   Neurological:      Cranial Nerves: No cranial nerve deficit.      Sensory: No sensory deficit.      Motor: No weakness.      Coordination: Coordination normal.      Gait: Gait normal.      Deep Tendon Reflexes: Reflexes normal.       Significant Labs: All pertinent labs within the past 24 hours have been reviewed.  CBC:   Recent Labs   Lab 06/06/22  1728 06/07/22  0442 06/08/22  0500   WBC 11.32 8.31 8.42   HGB 11.4* 9.7* 10.1*   HCT 34.1* 30.1* 31.9*    174 164     CMP:   Recent Labs   Lab 06/06/22  1728 06/07/22  0442 06/08/22  0500   * 136 138   K 4.3 3.8 3.8   CL 93* 102 106   CO2 15* 15* 18*    121* 133*   BUN 88* 83* 60*   CREATININE 4.8* 3.9* 2.7*   CALCIUM 10.0 8.7 9.1   PROT 8.3  --   --    ALBUMIN 3.6  --   --    BILITOT 0.4  --   --    ALKPHOS 79  --   --    AST 59*  --   --    ALT 31  --   --    ANIONGAP 24* 19* 14   EGFRNONAA  11* 14* 22*     Urine Studies:   Recent Labs   Lab 06/08/22  1505   COLORU Yellow   APPEARANCEUA Clear   PHUR 6.0   SPECGRAV 1.010   PROTEINUA Negative   GLUCUA Negative   KETONESU Negative   BILIRUBINUA Negative   OCCULTUA 2+*   NITRITE Negative   UROBILINOGEN Negative   LEUKOCYTESUR 1+*   RBCUA 25*   WBCUA 3   BACTERIA Rare   SQUAMEPITHEL 2       Significant Imaging:     Imaging Results              X-Ray Chest AP Portable (Final result)  Result time 06/06/22 17:21:21      Final result by Shadi Aguilar MD (06/06/22 17:21:21)                   Impression:      No acute abnormality.      Electronically signed by: Jeff Hsu  Date:    06/06/2022  Time:    17:21               Narrative:    EXAMINATION:  XR CHEST AP PORTABLE    CLINICAL HISTORY:  Chest Pain;    TECHNIQUE:  Single frontal view of the chest was performed.    COMPARISON:  None    FINDINGS:  Mild patient rotation.The lungs are clear, with normal appearance of pulmonary vasculature and no pleural effusion or pneumothorax.    The cardiac silhouette is normal in size. The hilar and mediastinal contours are unremarkable.    Bones are intact.                                         Assessment/Plan:      * Dehydration  Due to GI losses   IVFs   Antiemetics prn   6/7/22 The patient went for a CT ABD/pelvis which showed no acute abnormality. JESSICA is improved overnight with IV hydration. Will continue IV fluids and monitor renal function.     Melena  x2 episodes of melena   H/H stable   Monitor CBC   IV Protonix   CT abdomen pending   GI consult pending clinical course   6/7/22 The patient went for a CT ABD/pelvis which showed no acute abnormality. The patient reports black stool recently but none since admission. H/H today is 9.7/30.1. Will continue to monitor H/H and will consult GI for any further drop in H/H or further melena. Continue current management.   As of 6/8/2022, 10.1/31.9 today.     Debility  Hx cerebral palsy   PT/OT   Turn q2 hours      6/8/2022  PT/OT recommend SNF placement, however pt refused     Pt and family reports decrease strength in upper and lower extrimeties in pass 2 weeks. Neurology consulted for further evaluation.       Gastroparesis  Likely due to cerebral palsy/THC?   Antiemetics  prn         Acute renal failure superimposed on stage 4 chronic kidney disease  Creatinine 4.8 (2.8 - 2 weeks ago)   IVFs   Follow Bmp   Nephrology consult pending clinical course     6/8/2022  Creatinine trending downward, 2.7 today   Nephrology consulted for further evaluation of metabolic acidosis   Recomendations noted     HTN (hypertension)  Resume home meds   Bp stable       Major depression  Resume zoloft         VTE Risk Mitigation (From admission, onward)         Ordered     Place SAHIL hose  Until discontinued         06/06/22 2209     IP VTE HIGH RISK PATIENT  Once         06/06/22 2209     Place sequential compression device  Until discontinued         06/06/22 2209     Place sequential compression device  Until discontinued         06/06/22 2209                Discharge Planning   MARY KATE:      Code Status: Full Code   Is the patient medically ready for discharge?:     Reason for patient still in hospital (select all that apply): Patient trending condition and Treatment  Discharge Plan A: Home, Home with family                  Demetrius Velazquez NP  Department of Hospital Medicine   [facility] - Telemetry ([facility])

## 2022-06-08 NOTE — PT/OT/SLP PROGRESS
"Physical Therapy  Treatment    Francis Bains   MRN: [id number]   Admitting Diagnosis: Dehydration    PT Received On: 06/08/22  PT Start Time: 1010     PT Stop Time: 1105    PT Total Time (min): 60 min       Billable Minutes:  Therapeutic Activity 30 and Neuromuscular Re-education 30    Treatment Type: Treatment  PT/PTA: PTA     PTA Visit Number: 1       General Precautions: Standard, fall  Orthopedic Precautions: N/A   Braces: N/A  Respiratory Status: Room air    Spiritual, Cultural Beliefs, Gnosticism Practices, Values that Affect Care: no    Subjective:  Communicated with patient's nurse, Dominguez, and completed Epic chart review prior to session.  Patient required increased encouragement from mother and therapist to participate fully in PT session.  "I can't walk. I don't know why they don't listen to me." "I can't do it."   "I want coffee!' "Why can't I have coffee?"    Pain/Comfort  Pain Rating 1:  (C/O PAIN WITH ALL MOVEMENT THROUGHOUT SESSION; FREQUENTLY CRYING OUT WHEN MOVING ALL EXTREMITIES AND TRUNK)  Pain Addressed 1: Cessation of Activity, Distraction, Other (see comments) (ACTIVITY PACING)    Objective:   Patient found with: telemetry, peripheral IV, PureWick, bed alarm    Spent several minutes answering questions from patient's mother RE: discharge recommendation established in eval,  vs SNF placement, level of intensity and what to expect in either setting, importance of early mobility to prevent further loss of ROM and strength.    Functional Mobility:  Patient required Total A of 2 for the following:  Moving BLE into hooklying position, rolling towards L side, and completing supine > sit T/F.   Patient crying out throughout all movements with c/o pain in BLE and BUE. Expressed increased fear of falling during supine > sit transition which caused some physical resistance to movement/ assistance. Patient reassured throughout.     Upon upright patient demanded to stop. Attempted to encourage patient to " continue and, again, educate her on the importance of early mobility to promote return of function. Patient's mother also attempted to encourage patient with participation.     Forward scoot towards EOB: Total A of 2 (patient again crying out with movement)    Patient sat EOB x20 min total focusing on increased tolerance to upright position, core stability, trunk control and CV endurance.   Required Total A of 2 to maintain sitting balance initially but improved to Max A of 1-2 when B hands placed in weight bearing position.   Intermittent VC given to promote upright as patient demonstrated kyphotic posture and would allow her head to hang in a hyper extended position. Patient appeared to have difficulty maintaining neutral head position initially but progressively improved somewhat with increased time and cueing.     Sit > supine: Total A of 2    Supine scoot towards HOB: Total A of 2 (bed in trendelenburg)    Educated patient on importance of increased tolerance to upright position to promote CV endurance. Encouraged patient to utilize elevated HOB to achieve simulated chair position until able to safely complete T/F. Also encouraged patient to assist staff with mobility as much as possible as this might dissipate some of her fear of falling in addition to rebuilding muscular strength. Patient verbalized understanding but was somewhat resistant to elevated HOB. Patient instructed to use short intervals ~20-30 min at a time throughout the day to build tolerance and utilize full upright with meals.      AM-PAC 6 CLICK MOBILITY  How much help from another person does this patient currently need?   1 = Unable, Total/Dependent Assistance  2 = A lot, Maximum/Moderate Assistance  3 = A little, Minimum/Contact Guard/Supervision  4 = None, Modified Reagan/Independent    Turning over in bed (including adjusting bedclothes, sheets and blankets)?: 1  Sitting down on and standing up from a chair with arms (e.g.,  wheelchair, bedside commode, etc.): 1  Moving from lying on back to sitting on the side of the bed?: 1  Moving to and from a bed to a chair (including a wheelchair)?: 1  Need to walk in hospital room?: 1  Climbing 3-5 steps with a railing?: 1  Basic Mobility Total Score: 6    AM-PAC Raw Score CMS G-Code Modifier Level of Impairment Assistance   6 % Total / Unable   7 - 9 CM 80 - 100% Maximal Assist   10 - 14 CL 60 - 80% Moderate Assist   15 - 19 CK 40 - 60% Moderate Assist   20 - 22 CJ 20 - 40% Minimal Assist   23 CI 1-20% SBA / CGA   24 CH 0% Independent/ Mod I     Patient left with bed in chair position with call button in reach, bed alarm on, nurse & aide notified and mother present.    Assessment:  Francis Bains is a 32 y.o. female with a medical diagnosis of Dehydration and presents with overall decline in functional mobility. Patient would continue to benefit from skilled PT to address functional limitations listed below in order to return to PLOF/decrease caregiver burden.    Rehab identified problem list/impairments: Rehab identified problem list/impairments: weakness, impaired endurance, impaired sensation, impaired self care skills, impaired functional mobilty, gait instability, impaired balance, decreased coordination, decreased upper extremity function, decreased lower extremity function, pain, decreased ROM, impaired coordination, impaired cardiopulmonary response to activity    Rehab potential is fair.    Activity tolerance: Poor    Discharge recommendations: Discharge Facility/Level of Care Needs: nursing facility, skilled     Barriers to discharge:      Equipment recommendations: Equipment Needed After Discharge: other (see comments) (TBD BY NEXT LEVEL OF CARE)     GOALS:   Multidisciplinary Problems     Physical Therapy Goals        Problem: Physical Therapy    Goal Priority Disciplines Outcome Goal Variances Interventions   Physical Therapy Goal     PT, PT/OT      Description: Patient  will be seen a minimal of 3 out of 7 days a week.     LTG to be met by 06/21:     Bed mobility: CGA  Transfers: Min A  Gait: Min A                   PLAN:    Patient to be seen 3 x/week  to address the above listed problems via gait training, therapeutic activities, therapeutic exercises  Plan of Care expires: 06/21/22  Plan of Care reviewed with: patient, mother         06/08/2022

## 2022-06-08 NOTE — SUBJECTIVE & OBJECTIVE
Interval History:     Review of Systems   Constitutional:  Positive for activity change. Negative for chills and fever.   HENT:  Positive for hearing loss and trouble swallowing. Negative for congestion, rhinorrhea and sinus pressure.    Respiratory:  Negative for apnea, cough, choking, chest tightness, shortness of breath, wheezing and stridor.    Cardiovascular:  Negative for chest pain, palpitations and leg swelling.   Gastrointestinal:  Negative for abdominal distention, abdominal pain, diarrhea, nausea and vomiting.   Endocrine: Negative for cold intolerance and heat intolerance.   Genitourinary:  Negative for difficulty urinating and hematuria.   Musculoskeletal:  Negative for arthralgias and joint swelling.   Skin:  Negative for color change, pallor and rash.   Neurological:  Positive for weakness. Negative for dizziness, seizures, numbness and headaches.   Psychiatric/Behavioral:  Negative for agitation. The patient is not nervous/anxious.    Objective:     Vital Signs (Most Recent):  Temp: 97.9 °F (36.6 °C) (06/08/22 1543)  Pulse: 97 (06/08/22 1543)  Resp: 18 (06/08/22 1543)  BP: (!) 126/102 (06/08/22 1543)  SpO2: 98 % (06/08/22 1543)   Vital Signs (24h Range):  Temp:  [97.6 °F (36.4 °C)-98.6 °F (37 °C)] 97.9 °F (36.6 °C)  Pulse:  [] 97  Resp:  [16-20] 18  SpO2:  [97 %-99 %] 98 %  BP: (123-138)/() 126/102     Weight: 89 kg (196 lb 3.4 oz)  Body mass index is 34.76 kg/m².    Intake/Output Summary (Last 24 hours) at 6/8/2022 1632  Last data filed at 6/8/2022 1436  Gross per 24 hour   Intake 951 ml   Output 2000 ml   Net -1049 ml      Physical Exam  Vitals and nursing note reviewed.   Constitutional:       General: She is not in acute distress.     Appearance: She is not ill-appearing, toxic-appearing or diaphoretic.   Eyes:      General:         Right eye: No discharge.         Left eye: No discharge.   Cardiovascular:      Heart sounds: No murmur heard.    No friction rub. No gallop.   Pulmonary:       Effort: No respiratory distress.      Breath sounds: No stridor. No wheezing, rhonchi or rales.   Chest:      Chest wall: No tenderness.   Abdominal:      General: There is no distension.      Palpations: There is no mass.      Tenderness: There is no abdominal tenderness. There is no right CVA tenderness, left CVA tenderness, guarding or rebound.      Hernia: No hernia is present.   Musculoskeletal:         General: No swelling, tenderness, deformity or signs of injury.      Right lower leg: No edema.      Left lower leg: No edema.   Skin:     Coloration: Skin is not jaundiced or pale.      Findings: No bruising, erythema, lesion or rash.   Neurological:      Cranial Nerves: No cranial nerve deficit.      Sensory: No sensory deficit.      Motor: No weakness.      Coordination: Coordination normal.      Gait: Gait normal.      Deep Tendon Reflexes: Reflexes normal.       Significant Labs: All pertinent labs within the past 24 hours have been reviewed.  CBC:   Recent Labs   Lab 06/06/22 1728 06/07/22  0442 06/08/22  0500   WBC 11.32 8.31 8.42   HGB 11.4* 9.7* 10.1*   HCT 34.1* 30.1* 31.9*    174 164     CMP:   Recent Labs   Lab 06/06/22  1728 06/07/22  0442 06/08/22  0500   * 136 138   K 4.3 3.8 3.8   CL 93* 102 106   CO2 15* 15* 18*    121* 133*   BUN 88* 83* 60*   CREATININE 4.8* 3.9* 2.7*   CALCIUM 10.0 8.7 9.1   PROT 8.3  --   --    ALBUMIN 3.6  --   --    BILITOT 0.4  --   --    ALKPHOS 79  --   --    AST 59*  --   --    ALT 31  --   --    ANIONGAP 24* 19* 14   EGFRNONAA 11* 14* 22*     Urine Studies:   Recent Labs   Lab 06/08/22  1505   COLORU Yellow   APPEARANCEUA Clear   PHUR 6.0   SPECGRAV 1.010   PROTEINUA Negative   GLUCUA Negative   KETONESU Negative   BILIRUBINUA Negative   OCCULTUA 2+*   NITRITE Negative   UROBILINOGEN Negative   LEUKOCYTESUR 1+*   RBCUA 25*   WBCUA 3   BACTERIA Rare   SQUAMEPITHEL 2       Significant Imaging:     Imaging Results              X-Ray Chest AP  Portable (Final result)  Result time 06/06/22 17:21:21      Final result by hSadi Aguilar MD (06/06/22 17:21:21)                   Impression:      No acute abnormality.      Electronically signed by: Jeff Hsu  Date:    06/06/2022  Time:    17:21               Narrative:    EXAMINATION:  XR CHEST AP PORTABLE    CLINICAL HISTORY:  Chest Pain;    TECHNIQUE:  Single frontal view of the chest was performed.    COMPARISON:  None    FINDINGS:  Mild patient rotation.The lungs are clear, with normal appearance of pulmonary vasculature and no pleural effusion or pneumothorax.    The cardiac silhouette is normal in size. The hilar and mediastinal contours are unremarkable.    Bones are intact.

## 2022-06-08 NOTE — ASSESSMENT & PLAN NOTE
Hx cerebral palsy   PT/OT   Turn q2 hours     6/8/2022  PT/OT recommend SNF placement, however pt refused

## 2022-06-08 NOTE — CONSULTS
[facility] ([facility])  Nephrology  Consult Note    Patient Name: Francis Bains  MRN: [id number]  Admission Date: 6/6/2022  Hospital Length of Stay: 1 days  Attending Provider: Kali Charles MD   Primary Care Physician: Bri Garnica MD  Principal Problem:Dehydration   Reason for Consult: JESSICA on CKD    Inpatient consult to Nephrology  Consult performed by: Gerber Pappas MD  Consult ordered by: Demetrius Velazquez NP  Reason for consult: Reason for Consult: JESSICA on CKD        Subjective:     HPI:   31 yo female with history of JESSICA and CKD and is s/p renal biopsy (but results not available at this time) admitted as a transfer for worsening weakness. Her kidney function is improving but Nephrology consulted for evaluation of metabolic acidosis. She has a history of this, and accordingly does worsen when she is in JESSICA.     Past Medical History:   Diagnosis Date    Cerebral palsy     Hearing impaired person     Hypertension     Osteopenia determined by x-ray     Oswaldo syndrome        Past Surgical History:   Procedure Laterality Date    CHOLECYSTECTOMY      INNER EAR SURGERY      RENAL BIOPSY  07/07/14       Review of patient's allergies indicates:   Allergen Reactions    Ace inhibitors Other (See Comments)    Nabumetone Other (See Comments)    Nsaids (non-steroidal anti-inflammatory drug) Other (See Comments)    Sulfa (sulfonamide antibiotics) Hives    Pcn  [penicillins] Rash     Current Facility-Administered Medications   Medication Frequency    acetaminophen tablet 650 mg Q8H PRN    carvediloL tablet 6.25 mg BID    clonazePAM tablet 1 mg Daily    dextrose 10% bolus 125 mL PRN    dextrose 10% bolus 250 mL PRN    glucagon (human recombinant) injection 1 mg PRN    glucose chewable tablet 16 g PRN    glucose chewable tablet 24 g PRN    HYDROcodone-acetaminophen  mg per tablet 1 tablet Q4H PRN    ondansetron injection 4 mg Q8H PRN    pantoprazole injection 40 mg Daily     sertraline tablet 50 mg Daily    sodium chloride 0.9% 1,000 mL with mvi, adult no.4 with vit K 3,300 unit- 150 mcg/10 mL 10 mL, thiamine 100 mg, folic acid 1 mg infusion Once    traZODone tablet 50 mg Nightly PRN     Family History    None       Tobacco Use    Smoking status: Former Smoker     Packs/day: 0.25     Years: 9.00     Pack years: 2.25    Smokeless tobacco: Never Used   Substance and Sexual Activity    Alcohol use: Yes     Comment: Social    Drug use: Yes     Types: Marijuana    Sexual activity: Yes     Partners: Male     Birth control/protection: Inserts     Comment: Single.     Review of Systems   Unable to perform ROS: Other    not available   Objective:     Vital Signs (Most Recent):  Temp: 97.6 °F (36.4 °C) (06/08/22 1207)  Pulse: 92 (06/08/22 1207)  Resp: 18 (06/08/22 1232)  BP: (!) 124/99 (06/08/22 1207)  SpO2: 99 % (06/08/22 1207)  O2 Device (Oxygen Therapy): room air (06/07/22 2042) Vital Signs (24h Range):  Temp:  [97.6 °F (36.4 °C)-98.6 °F (37 °C)] 97.6 °F (36.4 °C)  Pulse:  [] 92  Resp:  [16-20] 18  SpO2:  [97 %-99 %] 99 %  BP: (123-138)/() 124/99     Weight: 89 kg (196 lb 3.4 oz) (06/06/22 2154)  Body mass index is 34.76 kg/m².  Body surface area is 1.99 meters squared.    I/O last 3 completed shifts:  In: 1520.1 [I.V.:1520.1]  Out: 3600 [Urine:3600]    Physical Exam  Vitals and nursing note reviewed.   Constitutional:       General: She is not in acute distress.     Appearance: She is obese.   Pulmonary:      Effort: No respiratory distress.   Neurological:      Mental Status: She is alert.         Significant Labs: reviewed    Significant Imaging:  Recent CT reviewed regarding kidneys     Assessment/Plan:     Active Diagnoses:    Diagnosis Date Noted POA    PRINCIPAL PROBLEM:  Dehydration [E86.0] 05/19/2022 Yes    Gastroparesis [K31.84] 06/06/2022 Yes    Debility [R53.81] 06/06/2022 Yes    Melena [K92.1] 06/06/2022 Yes    HTN (hypertension) [I10] 05/19/2022  Yes    Major depression [F32.9] 05/19/2022 Yes    Acute renal failure superimposed on stage 4 chronic kidney disease [N17.9, N18.4] 05/19/2022 Yes      Problems Resolved During this Admission:       JESSICA on CKD  - improving daily  - needs follow up with her Primary Nephrologist Dr. Dena Blair    Metabolic Acidosis  - most congruent with metabolic acidosis of CKD, which worsens accordingly with JESSICA  - recommend po bicarbonate to avoid long term complications of chronically untreated metabolic acidosis (bone and muscle loss etc)  - recommend dose 325mg daily to start       Thank you for your consult. I will sign off. Please contact us if you have any additional questions.    Gerber Pappas MD  Nephrology  [facility] ([facility])

## 2022-06-08 NOTE — CONSULTS
[facility] ([facility])  Neurology  Consult Note    Patient Name: Francis Bains  MRN: [id number]  Admission Date: 6/6/2022  Hospital Length of Stay: 1 days  Code Status: Full Code   Attending Provider: Kali Charles MD   Consulting Provider: Joshua Montes MD  Primary Care Physician: Bri Garnica MD  Principal Problem:Dehydration    Inpatient consult to Telemedicine-General Neurology  Consult performed by: Joshua Montes MD  Consult ordered by: Demetrius Velazquez NP        Subjective:     Chief Complaint:  Weakness on limbs     HPI: 32 y.o. female patient with a PMHx of cerebral palsy, kidney disease, HTN, and Oswaldo Syndrome who presents to the ED due to hypotension for evaluation of hypotension. Two weeks ago she had gone to another ED for generalized weakness and found to be hypotensive. Pt apparently was able to walk when she was first admitted, but has been unable to walk since being discharged. She is reported to had had a decline for a few months now. On arrival to ED she was found to be hypotensive (80/40's) and BUN in 88, Cr 4.8.  UDS was positive for benzo's (pt has Rx for clonazepam) and THC. Stool positive for occult blood.    Reviewing records from previous admission, MG panel was negative, mildly elevated CK.    Past Medical History:   Diagnosis Date    Cerebral palsy     Hearing impaired person     Hypertension     Osteopenia determined by x-ray     Oswaldo syndrome        Past Surgical History:   Procedure Laterality Date    CHOLECYSTECTOMY      INNER EAR SURGERY      RENAL BIOPSY  07/07/14       Review of patient's allergies indicates:   Allergen Reactions    Ace inhibitors Other (See Comments)    Nabumetone Other (See Comments)    Nsaids (non-steroidal anti-inflammatory drug) Other (See Comments)    Sulfa (sulfonamide antibiotics) Hives    Pcn  [penicillins] Rash       Current Neurological Medications:    No current facility-administered medications on file prior to  encounter.     Current Outpatient Medications on File Prior to Encounter   Medication Sig    pantoprazole (PROTONIX) 40 MG tablet TK 1 T PO  D    traZODone (DESYREL) 50 MG tablet     alendronate (FOSAMAX) 70 MG tablet Take 1 tablet (70 mg total) by mouth every 7 days.    amlodipine (NORVASC) 10 MG tablet Take 10 mg by mouth once daily.    carvedilol (COREG) 6.25 MG tablet 12.5 mg.     clonazePAM (KLONOPIN) 1 MG tablet Take 1 tablet (1 mg total) by mouth daily as needed for Anxiety.    ergocalciferol (ERGOCALCIFEROL) 50,000 unit Cap Vitamin D2 50,000 unit capsule    estrogen, conjugated,-medroxyprogesterone 0.625-2.5mg (PREMPRO) 0.625-2.5 mg per tablet Take 1 tablet by mouth once daily.    famotidine (PEPCID) 20 MG tablet TAKE 1 TABLET PO TWICE A DAY    famotidine (PEPCID) 20 MG tablet Take 1 tablet (20 mg total) by mouth 2 (two) times daily.    ondansetron (ZOFRAN-ODT) 4 MG TbDL Take 1 tablet (4 mg total) by mouth every 8 (eight) hours as needed (nausea).    sertraline (ZOLOFT) 100 MG tablet     [DISCONTINUED] losartan (COZAAR) 50 MG tablet losartan 50 mg tablet      Family History    None       Tobacco Use    Smoking status: Former Smoker     Packs/day: 0.25     Years: 9.00     Pack years: 2.25    Smokeless tobacco: Never Used   Substance and Sexual Activity    Alcohol use: Yes     Comment: Social    Drug use: Yes     Types: Marijuana    Sexual activity: Yes     Partners: Male     Birth control/protection: Inserts     Comment: Single.     Review of Systems   Constitutional: Positive for fatigue.   Eyes: Negative for photophobia.   Respiratory: Negative for shortness of breath.    Cardiovascular: Negative for chest pain.   Gastrointestinal: Negative for abdominal pain.   Genitourinary: Negative for dysuria.   Neurological: Negative for headaches.     Objective:     Vital Signs (Most Recent):  Temp: 97.6 °F (36.4 °C) (06/08/22 1207)  Pulse: 92 (06/08/22 1207)  Resp: 18 (06/08/22 1232)  BP: (!)  124/99 (06/08/22 1207)  SpO2: 99 % (06/08/22 1207) Vital Signs (24h Range):  Temp:  [97.6 °F (36.4 °C)-98.6 °F (37 °C)] 97.6 °F (36.4 °C)  Pulse:  [] 92  Resp:  [16-20] 18  SpO2:  [97 %-99 %] 99 %  BP: (123-138)/() 124/99     Weight: 89 kg (196 lb 3.4 oz)  Body mass index is 34.76 kg/m².    Physical Exam  Constitutional:       General: She is not in acute distress.  Pulmonary:      Effort: No respiratory distress.   Neurological:      Mental Status: She is oriented to person, place, and time.         NEUROLOGICAL EXAMINATION:     MENTAL STATUS   Oriented to person, place, and time.   Level of consciousness: alert    CRANIAL NERVES     CN VII   Right facial weakness: none  Left facial weakness: none    MOTOR EXAM        AROM of UE's against gravity       Significant Labs:   CBC:   Recent Labs   Lab 06/06/22  1728 06/07/22  0442 06/08/22  0500   WBC 11.32 8.31 8.42   HGB 11.4* 9.7* 10.1*   HCT 34.1* 30.1* 31.9*    174 164     CMP:   Recent Labs   Lab 06/06/22  1728 06/07/22  0442 06/08/22  0500    121* 133*   * 136 138   K 4.3 3.8 3.8   CL 93* 102 106   CO2 15* 15* 18*   BUN 88* 83* 60*   CREATININE 4.8* 3.9* 2.7*   CALCIUM 10.0 8.7 9.1   PROT 8.3  --   --    ALBUMIN 3.6  --   --    BILITOT 0.4  --   --    ALKPHOS 79  --   --    AST 59*  --   --    ALT 31  --   --    ANIONGAP 24* 19* 14   EGFRNONAA 11* 14* 22*       Significant Imaging: I have reviewed all pertinent imaging results/findings within the past 24 hours.    Head CT  Impression:     No CT evidence of an acute intracranial abnormality.     Limitations due to artifact created by the patient's cochlear implant device.  No significant change when compared with the previous study of June 16, 2013.        Electronically signed by: Nathaniel Bryan MD  Date:                                            05/20/2022  Time:                                           11:19    Assessment and Plan:     31 y/o female consulted for weakness of  limbs    1. Weakness of limbs: this could be sequelae of hypotension and result of worsening renal failure. Weakness seems to be generalized.   On head CT no acute abnormalities.   There is mention on notes about her grandmother (pt lives with her) having Munchausen Syndrome but this would be to be further investigated.   -Renal function improving to baseline.   -PT/OT.    Active Diagnoses:    Diagnosis Date Noted POA    PRINCIPAL PROBLEM:  Dehydration [E86.0] 05/19/2022 Yes    Gastroparesis [K31.84] 06/06/2022 Yes    Debility [R53.81] 06/06/2022 Yes    Melena [K92.1] 06/06/2022 Yes    HTN (hypertension) [I10] 05/19/2022 Yes    Major depression [F32.9] 05/19/2022 Yes    Acute renal failure superimposed on stage 4 chronic kidney disease [N17.9, N18.4] 05/19/2022 Yes      Problems Resolved During this Admission:       VTE Risk Mitigation (From admission, onward)         Ordered     Place SAHIL hose  Until discontinued         06/06/22 2209     IP VTE HIGH RISK PATIENT  Once         06/06/22 2209     Place sequential compression device  Until discontinued         06/06/22 2209     Place sequential compression device  Until discontinued         06/06/22 2209                Thank you for your consult. I will follow-up with patient. Please contact us if you have any additional questions.    Joshua Montes MD  Neurology  [facility] ([facility])

## 2022-06-08 NOTE — PLAN OF CARE
Pt NSR on the heart monitor. On room air; tolerating well. Purewick remains in place; MD and NP notified of blood in vaginal area/urine. BM x1 with dark green stool. Pt turned and repositioned with use of pillows and wedge. 20 G PIV in left AC remains CDI with no redness, swelling, warmth, or drainage.  utilized. Bed low, wheels locked, alarms audible. Plan of care reviewed. Vital signs monitored. Fall precautions in place. Pain assessed. Safety promoted. Infection risks reduced.

## 2022-06-08 NOTE — PLAN OF CARE
A237/A237 RHINA Bains is a 32 y.o.female admitted on 6/6/2022 for Dehydration   Code Status: Full Code MRN: [id number]   Review of patient's allergies indicates:   Allergen Reactions    Ace inhibitors Other (See Comments)    Nabumetone Other (See Comments)    Nsaids (non-steroidal anti-inflammatory drug) Other (See Comments)    Sulfa (sulfonamide antibiotics) Hives    Pcn  [penicillins] Rash     Past Medical History:   Diagnosis Date    Cerebral palsy     Hearing impaired person     Hypertension     Osteopenia determined by x-ray     Oswaldo syndrome       PRN meds    acetaminophen, 650 mg, Q8H PRN  dextrose 10%, 12.5 g, PRN  dextrose 10%, 25 g, PRN  glucagon (human recombinant), 1 mg, PRN  glucose, 16 g, PRN  glucose, 24 g, PRN  HYDROcodone-acetaminophen, 1 tablet, Q4H PRN  ondansetron, 4 mg, Q8H PRN  traZODone, 50 mg, Nightly PRN        Problem: Fluid and Electrolyte Imbalance (Acute Kidney Injury/Impairment)  Goal: Fluid and Electrolyte Balance  Outcome: Ongoing, Progressing     Problem: Renal Function Impairment (Acute Kidney Injury/Impairment)  Goal: Effective Renal Function  Outcome: Ongoing, Progressing        Orientation: oriented x 4  [place] Coma Scale Score: 15  O2 Device (Oxygen Therapy): room air  Lead Monitored: Lead II Rhythm: normal sinus rhythm    Cardiac/Telemetry Box Number:  ([id number])  VTE Required Core Measure: (SCDs) Sequential compression device initiated/maintained, Patient refused interventions Last Bowel Movement: 06/07/22  Diet NPO  Voiding Characteristics: external catheter  Ton Score: 13  Fall Risk Score: 21  Accucheck []   Freq?      Lines/Drains/Airways       Peripheral Intravenous Line  Duration                  Peripheral IV - Single Lumen 06/06/22 1730 20 G Left Antecubital 1 day

## 2022-06-08 NOTE — ASSESSMENT & PLAN NOTE
x2 episodes of melena   H/H stable   Monitor CBC   IV Protonix   CT abdomen pending   GI consult pending clinical course   6/7/22 The patient went for a CT ABD/pelvis which showed no acute abnormality. The patient reports black stool recently but none since admission. H/H today is 9.7/30.1. Will continue to monitor H/H and will consult GI for any further drop in H/H or further melena. Continue current management.   As of 6/8/2022, 10.1/31.9 today.

## 2022-06-09 PROBLEM — R11.2 N&V (NAUSEA AND VOMITING): Status: ACTIVE | Noted: 2022-06-09

## 2022-06-09 PROBLEM — R13.19 ESOPHAGEAL DYSPHAGIA: Status: ACTIVE | Noted: 2022-06-09

## 2022-06-09 LAB
ANION GAP SERPL CALC-SCNC: 12 MMOL/L (ref 8–16)
BUN SERPL-MCNC: 68 MG/DL (ref 6–20)
CALCIUM SERPL-MCNC: 8.9 MG/DL (ref 8.7–10.5)
CHLORIDE SERPL-SCNC: 103 MMOL/L (ref 95–110)
CO2 SERPL-SCNC: 19 MMOL/L (ref 23–29)
CREAT SERPL-MCNC: 3.6 MG/DL (ref 0.5–1.4)
EST. GFR  (AFRICAN AMERICAN): 18 ML/MIN/1.73 M^2
EST. GFR  (NON AFRICAN AMERICAN): 16 ML/MIN/1.73 M^2
GLUCOSE SERPL-MCNC: 77 MG/DL (ref 70–110)
POTASSIUM SERPL-SCNC: 4.6 MMOL/L (ref 3.5–5.1)
SODIUM SERPL-SCNC: 134 MMOL/L (ref 136–145)

## 2022-06-09 PROCEDURE — 99223 PR INITIAL HOSPITAL CARE,LEVL III: ICD-10-PCS | Mod: ,,, | Performed by: INTERNAL MEDICINE

## 2022-06-09 PROCEDURE — 36410 VNPNXR 3YR/> PHY/QHP DX/THER: CPT

## 2022-06-09 PROCEDURE — 94761 N-INVAS EAR/PLS OXIMETRY MLT: CPT

## 2022-06-09 PROCEDURE — 80048 BASIC METABOLIC PNL TOTAL CA: CPT | Performed by: NURSE PRACTITIONER

## 2022-06-09 PROCEDURE — 25000003 PHARM REV CODE 250: Performed by: NURSE PRACTITIONER

## 2022-06-09 PROCEDURE — 36415 COLL VENOUS BLD VENIPUNCTURE: CPT | Performed by: NURSE PRACTITIONER

## 2022-06-09 PROCEDURE — 21400001 HC TELEMETRY ROOM

## 2022-06-09 PROCEDURE — C1751 CATH, INF, PER/CENT/MIDLINE: HCPCS

## 2022-06-09 PROCEDURE — 99223 1ST HOSP IP/OBS HIGH 75: CPT | Mod: ,,, | Performed by: INTERNAL MEDICINE

## 2022-06-09 PROCEDURE — 76937 US GUIDE VASCULAR ACCESS: CPT

## 2022-06-09 RX ORDER — CLONAZEPAM 1 MG/1
1 TABLET ORAL 2 TIMES DAILY
Status: DISCONTINUED | OUTPATIENT
Start: 2022-06-09 | End: 2022-06-10 | Stop reason: HOSPADM

## 2022-06-09 RX ORDER — SODIUM CHLORIDE 9 MG/ML
INJECTION, SOLUTION INTRAVENOUS CONTINUOUS
Status: DISCONTINUED | OUTPATIENT
Start: 2022-06-09 | End: 2022-06-10 | Stop reason: HOSPADM

## 2022-06-09 RX ORDER — SODIUM CHLORIDE 9 MG/ML
INJECTION, SOLUTION INTRAVENOUS CONTINUOUS
Status: DISCONTINUED | OUTPATIENT
Start: 2022-06-09 | End: 2022-06-09

## 2022-06-09 RX ORDER — HYDRALAZINE HYDROCHLORIDE 20 MG/ML
10 INJECTION INTRAMUSCULAR; INTRAVENOUS EVERY 8 HOURS PRN
Status: DISCONTINUED | OUTPATIENT
Start: 2022-06-09 | End: 2022-06-10 | Stop reason: HOSPADM

## 2022-06-09 RX ORDER — SODIUM BICARBONATE 325 MG/1
325 TABLET ORAL DAILY
Status: DISCONTINUED | OUTPATIENT
Start: 2022-06-09 | End: 2022-06-10 | Stop reason: HOSPADM

## 2022-06-09 RX ADMIN — HYDROCODONE BITARTRATE AND ACETAMINOPHEN 1 TABLET: 10; 325 TABLET ORAL at 09:06

## 2022-06-09 RX ADMIN — SERTRALINE HYDROCHLORIDE 50 MG: 50 TABLET ORAL at 08:06

## 2022-06-09 RX ADMIN — SODIUM CHLORIDE: 0.9 INJECTION, SOLUTION INTRAVENOUS at 04:06

## 2022-06-09 RX ADMIN — CLONAZEPAM 1 MG: 1 TABLET ORAL at 09:06

## 2022-06-09 RX ADMIN — CLONAZEPAM 1 MG: 1 TABLET ORAL at 01:06

## 2022-06-09 RX ADMIN — CLONAZEPAM 1 MG: 1 TABLET ORAL at 08:06

## 2022-06-09 RX ADMIN — HYDROCODONE BITARTRATE AND ACETAMINOPHEN 1 TABLET: 10; 325 TABLET ORAL at 04:06

## 2022-06-09 RX ADMIN — SODIUM BICARBONATE 325 MG: 325 TABLET ORAL at 04:06

## 2022-06-09 RX ADMIN — CARVEDILOL 6.25 MG: 6.25 TABLET, FILM COATED ORAL at 09:06

## 2022-06-09 RX ADMIN — TRAZODONE HYDROCHLORIDE 50 MG: 50 TABLET ORAL at 09:06

## 2022-06-09 RX ADMIN — HYDROCODONE BITARTRATE AND ACETAMINOPHEN 1 TABLET: 10; 325 TABLET ORAL at 07:06

## 2022-06-09 RX ADMIN — HYDROCODONE BITARTRATE AND ACETAMINOPHEN 1 TABLET: 10; 325 TABLET ORAL at 01:06

## 2022-06-09 RX ADMIN — CARVEDILOL 6.25 MG: 6.25 TABLET, FILM COATED ORAL at 08:06

## 2022-06-09 RX ADMIN — HYDROCODONE BITARTRATE AND ACETAMINOPHEN 1 TABLET: 10; 325 TABLET ORAL at 12:06

## 2022-06-09 NOTE — ASSESSMENT & PLAN NOTE
MBSS unremarkable. Recommend an esophagram today. If stricture found, will consider EGD.   Further recommendations after.

## 2022-06-09 NOTE — SUBJECTIVE & OBJECTIVE
Interval History: GI recommend esophagogram, procedure plan for 6/10/2022.      Review of Systems   Constitutional:  Positive for activity change. Negative for chills and fever.   HENT:  Positive for hearing loss and trouble swallowing. Negative for congestion, rhinorrhea and sinus pressure.    Respiratory:  Negative for apnea, cough, choking, chest tightness, shortness of breath, wheezing and stridor.    Cardiovascular:  Negative for chest pain, palpitations and leg swelling.   Gastrointestinal:  Negative for abdominal distention, abdominal pain, diarrhea, nausea and vomiting.   Endocrine: Negative for cold intolerance and heat intolerance.   Genitourinary:  Negative for difficulty urinating and hematuria.   Musculoskeletal:  Negative for arthralgias and joint swelling.   Skin:  Negative for color change, pallor and rash.   Neurological:  Positive for weakness. Negative for dizziness, seizures, numbness and headaches.   Psychiatric/Behavioral:  Negative for agitation. The patient is not nervous/anxious.    Objective:     Vital Signs (Most Recent):  Temp: 98.5 °F (36.9 °C) (06/09/22 1106)  Pulse: 91 (06/09/22 1106)  Resp: 18 (06/09/22 1206)  BP: 120/89 (06/09/22 1106)  SpO2: 97 % (06/09/22 1106) Vital Signs (24h Range):  Temp:  [97.6 °F (36.4 °C)-98.5 °F (36.9 °C)] 98.5 °F (36.9 °C)  Pulse:  [88-97] 91  Resp:  [16-18] 18  SpO2:  [96 %-98 %] 97 %  BP: (120-168)/() 120/89     Weight: 91.5 kg (201 lb 11.5 oz)  Body mass index is 35.73 kg/m².    Intake/Output Summary (Last 24 hours) at 6/9/2022 1247  Last data filed at 6/9/2022 0600  Gross per 24 hour   Intake 3220.04 ml   Output 702 ml   Net 2518.04 ml      Physical Exam  Vitals and nursing note reviewed.   Constitutional:       General: She is not in acute distress.     Appearance: She is ill-appearing. She is not toxic-appearing or diaphoretic.   Eyes:      General:         Right eye: No discharge.         Left eye: No discharge.   Cardiovascular:      Heart  sounds: No murmur heard.    No friction rub. No gallop.   Pulmonary:      Effort: No respiratory distress.      Breath sounds: No stridor. No wheezing, rhonchi or rales.   Chest:      Chest wall: No tenderness.   Abdominal:      General: There is no distension.      Palpations: There is no mass.      Tenderness: There is no abdominal tenderness. There is no right CVA tenderness, left CVA tenderness, guarding or rebound.      Hernia: No hernia is present.   Musculoskeletal:         General: No swelling, tenderness, deformity or signs of injury.      Right lower leg: No edema.      Left lower leg: No edema.   Skin:     Coloration: Skin is not jaundiced or pale.      Findings: No bruising, erythema, lesion or rash.   Neurological:      Mental Status: She is lethargic.      Cranial Nerves: No cranial nerve deficit.      Sensory: No sensory deficit.      Motor: Weakness present.      Coordination: Coordination normal.      Gait: Gait normal.      Deep Tendon Reflexes: Reflexes normal.      Comments: No movement of lower extremities  Significant decrease movement of bilateral upper extremities          Significant Labs: All pertinent labs within the past 24 hours have been reviewed.  BMP:   Recent Labs   Lab 06/08/22  0500   *      K 3.8      CO2 18*   BUN 60*   CREATININE 2.7*   CALCIUM 9.1     CBC:   Recent Labs   Lab 06/08/22  0500   WBC 8.42   HGB 10.1*   HCT 31.9*          Significant Imaging:     Imaging Results              X-Ray Chest AP Portable (Final result)  Result time 06/06/22 17:21:21      Final result by Shadi Aguilar MD (06/06/22 17:21:21)                   Impression:      No acute abnormality.      Electronically signed by: Jeff Hsu  Date:    06/06/2022  Time:    17:21               Narrative:    EXAMINATION:  XR CHEST AP PORTABLE    CLINICAL HISTORY:  Chest Pain;    TECHNIQUE:  Single frontal view of the chest was performed.    COMPARISON:  None    FINDINGS:  Mild  patient rotation.The lungs are clear, with normal appearance of pulmonary vasculature and no pleural effusion or pneumothorax.    The cardiac silhouette is normal in size. The hilar and mediastinal contours are unremarkable.    Bones are intact.

## 2022-06-09 NOTE — HPI
The patient has a history of cerebral palsy and presented to the ER for weakness and inability to walk which was new. Neurology was consulted and attributed it to dehydration and hypotension. We have been consulted for dysphagia, nausea and vomiting. The patient has some limitations when providing a history. Her mother helps but the patient only recently moved in with her parents. She was previously living with her grandmother in another city. The patient reports intermittent esophageal dysphagia to solids and liquids, and intermittent nausea and vomiting. Over the last month. She last vomited four days ago. She was started on a diet here yesterday and tolerated it, but didn't eat much. She has reported no dysphagia since admitted and tolerating pills. MBSS was unremarkable. CT scan abd/pelvis unremarkable. She had JESSICA on admit with some improvement with IV fluids. The patient's mother also reported a black stool, but says stools are now green. The patient reported being constipated before coming in but mom reported diarrhea. Unable to obtain a good history about baseline or current bowel habits. Hgb has been relatively stable. She is on Protonix here. The patient denies NSAID use. She takes Protonix daily and Pepcid bid. She has a Gastroenterologist but hasn't seen him in awhile. She reports having an EGD years ago.     Her tox screen was positive for THC. Her mother says she smokes it as much as she can, but they have been trying to limit her use the last couple of weeks that she was living with them. The patient becomes tearful at the idea of not having it. She says it helps her sleep, helps her appetite, and helps her pain.

## 2022-06-09 NOTE — CONSULTS
[facility] ([facility])  Gastroenterology  Consult Note    Patient Name: Francis Bains  MRN: [id number]  Admission Date: 6/6/2022  Hospital Length of Stay: 2 days  Code Status: Full Code   Attending Provider: Kali Charles MD   Consulting Provider: Christian Magaña PA-C  Primary Care Physician: Bri Garnica MD  Principal Problem:Dehydration    Inpatient consult to Gastroenterology  Consult performed by: Christian Magaña PA-C  Consult ordered by: Demetrius Velazquez NP  Reason for consult: dark stool, dysphagia        Subjective:     HPI:  The patient has a history of cerebral palsy and presented to the ER for weakness and inability to walk which was new. Neurology was consulted and attributed it to dehydration and hypotension. We have been consulted for dysphagia, nausea and vomiting. The patient has some limitations when providing a history. Her mother helps but the patient only recently moved in with her parents. She was previously living with her grandmother in another city. The patient reports intermittent esophageal dysphagia to solids and liquids, and intermittent nausea and vomiting. Over the last month. She last vomited four days ago. She was started on a diet here yesterday and tolerated it, but didn't eat much. She has reported no dysphagia since admitted and tolerating pills. MBSS was unremarkable. CT scan abd/pelvis unremarkable. She had JESSICA on admit with some improvement with IV fluids. The patient's mother also reported a black stool, but says stools are now green. The patient reported being constipated before coming in but mom reported diarrhea. Unable to obtain a good history about baseline or current bowel habits. Hgb has been relatively stable. She is on Protonix here. The patient denies NSAID use. She takes Protonix daily and Pepcid bid. She has a Gastroenterologist but hasn't seen him in awhile. She reports having an EGD years ago.     Her tox screen was positive for THC. Her mother says  she smokes it as much as she can, but they have been trying to limit her use the last couple of weeks that she was living with them. The patient becomes tearful at the idea of not having it. She says it helps her sleep, helps her appetite, and helps her pain.       Past Medical History:   Diagnosis Date    Cerebral palsy     Hearing impaired person     Hypertension     Osteopenia determined by x-ray     Oswaldo syndrome        Past Surgical History:   Procedure Laterality Date    CHOLECYSTECTOMY      INNER EAR SURGERY      RENAL BIOPSY  07/07/14       Review of patient's allergies indicates:   Allergen Reactions    Ace inhibitors Other (See Comments)    Nabumetone Other (See Comments)    Nsaids (non-steroidal anti-inflammatory drug) Other (See Comments)    Sulfa (sulfonamide antibiotics) Hives    Pcn  [penicillins] Rash     Family History    None       Tobacco Use    Smoking status: Former Smoker     Packs/day: 0.25     Years: 9.00     Pack years: 2.25    Smokeless tobacco: Never Used   Substance and Sexual Activity    Alcohol use: Yes     Comment: Social    Drug use: Yes     Types: Marijuana    Sexual activity: Yes     Partners: Male     Birth control/protection: Inserts     Comment: Single.     Review of Systems   Constitutional:  Negative for fever.   HENT:  Positive for hearing loss.    Respiratory:  Negative for cough and shortness of breath.    Cardiovascular:  Negative for chest pain.   Gastrointestinal:         As per HPI.   Genitourinary:  Positive for hematuria and vaginal bleeding. Negative for dysuria and frequency.   Musculoskeletal:         Generalized pain   Skin:  Negative for rash.   Neurological:  Negative for syncope and headaches.   Hematological:  Does not bruise/bleed easily.   Psychiatric/Behavioral:  The patient is nervous/anxious.    Objective:     Vital Signs (Most Recent):  Temp: 97.6 °F (36.4 °C) (06/09/22 0730)  Pulse: 93 (06/09/22 0730)  Resp: 16 (06/09/22 0730)  BP:  (!) 136/98 (06/09/22 0730)  SpO2: 98 % (06/09/22 0730)   Vital Signs (24h Range):  Temp:  [97.6 °F (36.4 °C)-98.2 °F (36.8 °C)] 97.6 °F (36.4 °C)  Pulse:  [88-97] 93  Resp:  [16-18] 16  SpO2:  [96 %-99 %] 98 %  BP: (124-168)/() 136/98     Weight: 91.5 kg (201 lb 11.5 oz) (06/09/22 0029)  Body mass index is 35.73 kg/m².      Intake/Output Summary (Last 24 hours) at 6/9/2022 0932  Last data filed at 6/9/2022 0600  Gross per 24 hour   Intake 3460.04 ml   Output 702 ml   Net 2758.04 ml       Lines/Drains/Airways       Drain  Duration             Female External Urinary Catheter 06/08/22 0317 1 day              Peripheral Intravenous Line  Duration                  Peripheral IV - Single Lumen 06/06/22 1730 20 G Left Antecubital 2 days                    Physical Exam  Constitutional:       Appearance: Normal appearance. She is well-developed.   HENT:      Head: Normocephalic and atraumatic.   Eyes:      Extraocular Movements: Extraocular movements intact.   Cardiovascular:      Rate and Rhythm: Normal rate and regular rhythm.      Heart sounds: No murmur heard.  Pulmonary:      Effort: Pulmonary effort is normal. No respiratory distress.      Breath sounds: Normal breath sounds. No wheezing.   Abdominal:      General: Bowel sounds are normal. There is no distension.      Palpations: Abdomen is soft. There is no mass.      Tenderness: There is no abdominal tenderness.   Musculoskeletal:      Right lower leg: No edema.      Left lower leg: No edema.   Skin:     General: Skin is warm and dry.   Neurological:      Mental Status: She is alert and oriented to person, place, and time.      Cranial Nerves: No cranial nerve deficit.      Comments: She is oriented but occasionally gets confused. Not sure how much may be related to her impaired hearing vs cognitive level.   Psychiatric:         Mood and Affect: Mood is anxious. Affect is tearful.       Significant Labs:  CBC:   Recent Labs   Lab 06/08/22  0500   WBC 8.42    HGB 10.1*   HCT 31.9*        CMP:   Recent Labs   Lab 06/08/22  0500   *   CALCIUM 9.1      K 3.8   CO2 18*      BUN 60*   CREATININE 2.7*     Coagulation: No results for input(s): PT, INR, APTT in the last 48 hours.    Significant Imaging:  Imaging results within the past 24 hours have been reviewed.    Assessment/Plan:     * Dehydration  Improving. Management per  team.   Albumin 3.6 on admit.    Esophageal dysphagia  MBSS unremarkable. Recommend an esophagram today. If stricture found, will consider EGD.   Further recommendations after.         N&V (nausea and vomiting)  Resolved per patient and mom. Continue antiemetics as needed.   Suspect THC playing a role. Discussed this with patient and mom. Considering the benefits she feels THC provides, she may want to find a medical provider to prescribe it. This would give her someone to monitor her symptoms, side effects and dosing.   The patient becomes very upset at the idea of having her markell taken away from her.     Black stool  No evidence of active GI bleeding. Stool now green per mom and Hgb stable.   Continue oral Protonix bid.   No plan for EGD at this time.   Monitor.        Thank you for your consult. I will follow-up with patient. Please contact us if you have any additional questions.    Christian Magaña PA-C  Gastroenterology  [facility] ([facility])

## 2022-06-09 NOTE — ASSESSMENT & PLAN NOTE
Creatinine 4.8 (2.8 - 2 weeks ago)   IVFs   Follow Bmp   Nephrology consult pending clinical course     6/8/2022  Creatinine trending downward, 2.7 today   Nephrology consulted for further evaluation of metabolic acidosis   Recomendations noted       6/9/2022  Creatinine at baseline   Nephrology consulted for further evaluation of metabolic acidosis   Start sodium bicar tab daily

## 2022-06-09 NOTE — PROGRESS NOTES
[facility] ER Medicine  Progress Note    Patient Name: Francis Bains  MRN: [id number]  Patient Class: IP- Inpatient   Admission Date: 6/6/2022  Length of Stay: 2 days  Attending Physician: Kali Charles MD  Primary Care Provider: Bri Garnica MD        Subjective:     Principal Problem:Dehydration        HPI:  Francis Bains is a 32 y.o. female patient with a PMHx of cerbral palsy, kidney disease, HTN, and ana maria syndrome who presents to the Emergency Department for evaluation of hypotension. Pt's father reports that pt was extremely weak and lightheaded 2 weeks ago and they went to the ED in [facility]. She was admitted to the hospital for 2 nights for severe dehydration and decreased kidney function. Pt was able to walk when she was first admitted, but has been unable to walk since being discharged. Pt's father reports that she has been getting weaker each day and is now barely able to move her arms. He also reports that pt has been getting sicker since February. Associated sxs include black stool, dizziness, nausea, vomiting, decreased appetite, and decreased urine. Patient denies any fever, dysuria, SOB, CP, and all other sxs at this time. In the ED pt was found to be hypotensive, chloride 93, CO2 15, Anion gap 24, BUN 88, Creatinine 4.8; UDS + for benzo and THC, + occult blood; CXR negative. Patient received 2L of NS in the ED. Patient placed in observation for dehydration and JESSICA under the care of hospital medicine. Of note pt mother reports pt has been leaving with her grandmother who has Munchausen syndrome.         Overview/Hospital Course:  6/7/22 No acute events overnight. The patient went for a CT ABD/pelvis which showed no acute abnormality. JESSICA is improved overnight with IV hydraation. Will continue IV fluids and monitor renal function. The patient reports blsck stool recently but none since admission. H/H today is 9.7/30.1. Will continue to monitor H/H and will  consult GI for any further drop in H/H or further melena. Continue current management.     As of 6/8/2022, vital signs and labs stable. H/H trending up 10.1/31.9 today, Pt and mother reports decrease strength over last 2 weeks. Neurology consulted. JESSICA improved, however she continue to experience metabolic acidosis. Nephrology consulted. PT/OT recommend SNF placement, however patient and family refuses. Will follow.     As of 6/9/2021, Nephrology and Neurology recommendations noted.  PT/OT recommmend SNF placement, however patient and family. GI consulted for dysphagia in the setting of resetting of dehydration, recommend esophagram. Radiology unable to do Esophagram today, plan for procedure on tomorrow.       Interval History: GI recommend esophagogram, procedure plan for 6/10/2022.      Review of Systems   Constitutional:  Positive for activity change. Negative for chills and fever.   HENT:  Positive for hearing loss and trouble swallowing. Negative for congestion, rhinorrhea and sinus pressure.    Respiratory:  Negative for apnea, cough, choking, chest tightness, shortness of breath, wheezing and stridor.    Cardiovascular:  Negative for chest pain, palpitations and leg swelling.   Gastrointestinal:  Negative for abdominal distention, abdominal pain, diarrhea, nausea and vomiting.   Endocrine: Negative for cold intolerance and heat intolerance.   Genitourinary:  Negative for difficulty urinating and hematuria.   Musculoskeletal:  Negative for arthralgias and joint swelling.   Skin:  Negative for color change, pallor and rash.   Neurological:  Positive for weakness. Negative for dizziness, seizures, numbness and headaches.   Psychiatric/Behavioral:  Negative for agitation. The patient is not nervous/anxious.    Objective:     Vital Signs (Most Recent):  Temp: 98.5 °F (36.9 °C) (06/09/22 1106)  Pulse: 91 (06/09/22 1106)  Resp: 18 (06/09/22 1206)  BP: 120/89 (06/09/22 1106)  SpO2: 97 % (06/09/22 1106) Vital Signs  (24h Range):  Temp:  [97.6 °F (36.4 °C)-98.5 °F (36.9 °C)] 98.5 °F (36.9 °C)  Pulse:  [88-97] 91  Resp:  [16-18] 18  SpO2:  [96 %-98 %] 97 %  BP: (120-168)/() 120/89     Weight: 91.5 kg (201 lb 11.5 oz)  Body mass index is 35.73 kg/m².    Intake/Output Summary (Last 24 hours) at 6/9/2022 1247  Last data filed at 6/9/2022 0600  Gross per 24 hour   Intake 3220.04 ml   Output 702 ml   Net 2518.04 ml      Physical Exam  Vitals and nursing note reviewed.   Constitutional:       General: She is not in acute distress.     Appearance: She is ill-appearing. She is not toxic-appearing or diaphoretic.   Eyes:      General:         Right eye: No discharge.         Left eye: No discharge.   Cardiovascular:      Heart sounds: No murmur heard.    No friction rub. No gallop.   Pulmonary:      Effort: No respiratory distress.      Breath sounds: No stridor. No wheezing, rhonchi or rales.   Chest:      Chest wall: No tenderness.   Abdominal:      General: There is no distension.      Palpations: There is no mass.      Tenderness: There is no abdominal tenderness. There is no right CVA tenderness, left CVA tenderness, guarding or rebound.      Hernia: No hernia is present.   Musculoskeletal:         General: No swelling, tenderness, deformity or signs of injury.      Right lower leg: No edema.      Left lower leg: No edema.   Skin:     Coloration: Skin is not jaundiced or pale.      Findings: No bruising, erythema, lesion or rash.   Neurological:      Mental Status: She is lethargic.      Cranial Nerves: No cranial nerve deficit.      Sensory: No sensory deficit.      Motor: Weakness present.      Coordination: Coordination normal.      Gait: Gait normal.      Deep Tendon Reflexes: Reflexes normal.      Comments: No movement of lower extremities  Significant decrease movement of bilateral upper extremities          Significant Labs: All pertinent labs within the past 24 hours have been reviewed.  BMP:   Recent Labs   Lab  06/08/22  0500   *      K 3.8      CO2 18*   BUN 60*   CREATININE 2.7*   CALCIUM 9.1     CBC:   Recent Labs   Lab 06/08/22  0500   WBC 8.42   HGB 10.1*   HCT 31.9*          Significant Imaging:     Imaging Results              X-Ray Chest AP Portable (Final result)  Result time 06/06/22 17:21:21      Final result by hSadi Aguilar MD (06/06/22 17:21:21)                   Impression:      No acute abnormality.      Electronically signed by: Jeff Hsu  Date:    06/06/2022  Time:    17:21               Narrative:    EXAMINATION:  XR CHEST AP PORTABLE    CLINICAL HISTORY:  Chest Pain;    TECHNIQUE:  Single frontal view of the chest was performed.    COMPARISON:  None    FINDINGS:  Mild patient rotation.The lungs are clear, with normal appearance of pulmonary vasculature and no pleural effusion or pneumothorax.    The cardiac silhouette is normal in size. The hilar and mediastinal contours are unremarkable.    Bones are intact.                                         Assessment/Plan:      * Dehydration  Due to GI losses   IVFs   Antiemetics prn   6/7/22 The patient went for a CT ABD/pelvis which showed no acute abnormality. JESSICA is improved overnight with IV hydration. Will continue IV fluids and monitor renal function.     N&V (nausea and vomiting)  GI following       Esophageal dysphagia  GI following, plan esophogram in AM       Black stool  x2 episodes of melena   H/H stable   Monitor CBC   IV Protonix   CT abdomen pending   GI consult pending clinical course   6/7/22 The patient went for a CT ABD/pelvis which showed no acute abnormality. The patient reports black stool recently but none since admission. H/H today is 9.7/30.1. Will continue to monitor H/H and will consult GI for any further drop in H/H or further melena. Continue current management.   As of 6/8/2022, 10.1/31.9 today.     6/9/2022- GI consulted     Debility  Hx cerebral palsy   PT/OT   Turn q2 hours     6/8/2022  PT/OT  recommend SNF placement, however pt refused         Acute renal failure superimposed on stage 4 chronic kidney disease  Creatinine 4.8 (2.8 - 2 weeks ago)   IVFs   Follow Bmp   Nephrology consult pending clinical course     6/8/2022  Creatinine trending downward, 2.7 today   Nephrology consulted for further evaluation of metabolic acidosis   Recomendations noted       6/9/2022  Creatinine increase to 3.6, will restart IVF for 24 hours   Nephrology consulted for further evaluation of metabolic acidosis   Start sodium bicar tab daily     HTN (hypertension)  Resume home meds   Bp stable       Major depression  Resume zoloft         VTE Risk Mitigation (From admission, onward)         Ordered     Place SAHIL hose  Until discontinued         06/06/22 2209     IP VTE HIGH RISK PATIENT  Once         06/06/22 2209     Place sequential compression device  Until discontinued         06/06/22 2209     Place sequential compression device  Until discontinued         06/06/22 2209                Discharge Planning   MARY KATE:      Code Status: Full Code   Is the patient medically ready for discharge?:     Reason for patient still in hospital (select all that apply): Patient trending condition and Treatment  Discharge Plan A: Home, Home with family                  Demetrius Velazquez NP  Department of Hospital Medicine   [facility] ([facility])

## 2022-06-09 NOTE — ASSESSMENT & PLAN NOTE
Resolved per patient and mom. Continue antiemetics as needed.   Suspect THC playing a role. Discussed this with patient and mom. Considering the benefits she feels THC provides, she may want to find a medical provider to prescribe it. This would give her someone to monitor her symptoms, side effects and dosing.   The patient becomes very upset at the idea of having her mariamukesh taken away from her.

## 2022-06-09 NOTE — PLAN OF CARE
Met with family and night shift nurse at [facility] at 0710. Pt was rcving a bath from night pct and day pct. PIV was noted to have been removed unknown to when. During morning care was able to speak and communicate with patient without difficulty. Pt became upset when informed she needed new IV access. Discontinued attempt for PIV per pt and family request and night shift nurse sent request for possible alternative access. Explained to patient with bed side  that I will write notes on the board and work to keep them informed as we progress through our day. Pt mother request that Prn meds be given on a schd. Advised that these meds must be requested as needed, but I will come around q4 to the best of my ability to keep her pain under control.

## 2022-06-09 NOTE — ASSESSMENT & PLAN NOTE
No evidence of active GI bleeding. Stool now green per mom and Hgb stable.   Continue oral Protonix bid.   No plan for EGD at this time.   Monitor.

## 2022-06-09 NOTE — SUBJECTIVE & OBJECTIVE
Past Medical History:   Diagnosis Date    Cerebral palsy     Hearing impaired person     Hypertension     Osteopenia determined by x-ray     Oswaldo syndrome        Past Surgical History:   Procedure Laterality Date    CHOLECYSTECTOMY      INNER EAR SURGERY      RENAL BIOPSY  07/07/14       Review of patient's allergies indicates:   Allergen Reactions    Ace inhibitors Other (See Comments)    Nabumetone Other (See Comments)    Nsaids (non-steroidal anti-inflammatory drug) Other (See Comments)    Sulfa (sulfonamide antibiotics) Hives    Pcn  [penicillins] Rash     Family History    None       Tobacco Use    Smoking status: Former Smoker     Packs/day: 0.25     Years: 9.00     Pack years: 2.25    Smokeless tobacco: Never Used   Substance and Sexual Activity    Alcohol use: Yes     Comment: Social    Drug use: Yes     Types: Marijuana    Sexual activity: Yes     Partners: Male     Birth control/protection: Inserts     Comment: Single.     Review of Systems   Constitutional:  Negative for fever.   HENT:  Positive for hearing loss.    Respiratory:  Negative for cough and shortness of breath.    Cardiovascular:  Negative for chest pain.   Gastrointestinal:         As per HPI.   Genitourinary:  Positive for hematuria and vaginal bleeding. Negative for dysuria and frequency.   Musculoskeletal:         Generalized pain   Skin:  Negative for rash.   Neurological:  Negative for syncope and headaches.   Hematological:  Does not bruise/bleed easily.   Psychiatric/Behavioral:  The patient is nervous/anxious.    Objective:     Vital Signs (Most Recent):  Temp: 97.6 °F (36.4 °C) (06/09/22 0730)  Pulse: 93 (06/09/22 0730)  Resp: 16 (06/09/22 0730)  BP: (!) 136/98 (06/09/22 0730)  SpO2: 98 % (06/09/22 0730)   Vital Signs (24h Range):  Temp:  [97.6 °F (36.4 °C)-98.2 °F (36.8 °C)] 97.6 °F (36.4 °C)  Pulse:  [88-97] 93  Resp:  [16-18] 16  SpO2:  [96 %-99 %] 98 %  BP: (124-168)/() 136/98     Weight: 91.5 kg (201 lb 11.5 oz)  (06/09/22 0029)  Body mass index is 35.73 kg/m².      Intake/Output Summary (Last 24 hours) at 6/9/2022 0932  Last data filed at 6/9/2022 0600  Gross per 24 hour   Intake 3460.04 ml   Output 702 ml   Net 2758.04 ml       Lines/Drains/Airways       Drain  Duration             Female External Urinary Catheter 06/08/22 0317 1 day              Peripheral Intravenous Line  Duration                  Peripheral IV - Single Lumen 06/06/22 1730 20 G Left Antecubital 2 days                    Physical Exam  Constitutional:       Appearance: Normal appearance. She is well-developed.   HENT:      Head: Normocephalic and atraumatic.   Eyes:      Extraocular Movements: Extraocular movements intact.   Cardiovascular:      Rate and Rhythm: Normal rate and regular rhythm.      Heart sounds: No murmur heard.  Pulmonary:      Effort: Pulmonary effort is normal. No respiratory distress.      Breath sounds: Normal breath sounds. No wheezing.   Abdominal:      General: Bowel sounds are normal. There is no distension.      Palpations: Abdomen is soft. There is no mass.      Tenderness: There is no abdominal tenderness.   Musculoskeletal:      Right lower leg: No edema.      Left lower leg: No edema.   Skin:     General: Skin is warm and dry.   Neurological:      Mental Status: She is alert and oriented to person, place, and time.      Cranial Nerves: No cranial nerve deficit.      Comments: She is oriented but occasionally gets confused. Not sure how much may be related to her impaired hearing vs cognitive level.   Psychiatric:         Mood and Affect: Mood is anxious. Affect is tearful.       Significant Labs:  CBC:   Recent Labs   Lab 06/08/22  0500   WBC 8.42   HGB 10.1*   HCT 31.9*        CMP:   Recent Labs   Lab 06/08/22  0500   *   CALCIUM 9.1      K 3.8   CO2 18*      BUN 60*   CREATININE 2.7*     Coagulation: No results for input(s): PT, INR, APTT in the last 48 hours.    Significant Imaging:  Imaging  results within the past 24 hours have been reviewed.

## 2022-06-10 ENCOUNTER — DOCUMENTATION ONLY (OUTPATIENT)
Dept: PRIMARY CARE CLINIC | Facility: CLINIC | Age: 32
End: 2022-06-10
Payer: MEDICAID

## 2022-06-10 ENCOUNTER — TELEPHONE (OUTPATIENT)
Dept: PRIMARY CARE CLINIC | Facility: CLINIC | Age: 32
End: 2022-06-10
Payer: MEDICAID

## 2022-06-10 VITALS
RESPIRATION RATE: 16 BRPM | HEART RATE: 104 BPM | HEIGHT: 63 IN | SYSTOLIC BLOOD PRESSURE: 124 MMHG | DIASTOLIC BLOOD PRESSURE: 93 MMHG | TEMPERATURE: 98 F | WEIGHT: 201.75 LBS | OXYGEN SATURATION: 98 % | BODY MASS INDEX: 35.75 KG/M2

## 2022-06-10 LAB
ANION GAP SERPL CALC-SCNC: 14 MMOL/L (ref 8–16)
BUN SERPL-MCNC: 68 MG/DL (ref 6–20)
CALCIUM SERPL-MCNC: 8.9 MG/DL (ref 8.7–10.5)
CHLORIDE SERPL-SCNC: 106 MMOL/L (ref 95–110)
CO2 SERPL-SCNC: 17 MMOL/L (ref 23–29)
CREAT SERPL-MCNC: 3.7 MG/DL (ref 0.5–1.4)
EST. GFR  (AFRICAN AMERICAN): 18 ML/MIN/1.73 M^2
EST. GFR  (NON AFRICAN AMERICAN): 15 ML/MIN/1.73 M^2
GLUCOSE SERPL-MCNC: 72 MG/DL (ref 70–110)
POTASSIUM SERPL-SCNC: 4.1 MMOL/L (ref 3.5–5.1)
SODIUM SERPL-SCNC: 137 MMOL/L (ref 136–145)

## 2022-06-10 PROCEDURE — 25000003 PHARM REV CODE 250: Performed by: NURSE PRACTITIONER

## 2022-06-10 PROCEDURE — 63600175 PHARM REV CODE 636 W HCPCS: Performed by: NURSE PRACTITIONER

## 2022-06-10 PROCEDURE — 94761 N-INVAS EAR/PLS OXIMETRY MLT: CPT

## 2022-06-10 PROCEDURE — 25500020 PHARM REV CODE 255: Performed by: EMERGENCY MEDICINE

## 2022-06-10 PROCEDURE — 97112 NEUROMUSCULAR REEDUCATION: CPT | Mod: CQ

## 2022-06-10 PROCEDURE — 99233 PR SUBSEQUENT HOSPITAL CARE,LEVL III: ICD-10-PCS | Mod: ,,, | Performed by: INTERNAL MEDICINE

## 2022-06-10 PROCEDURE — 99233 SBSQ HOSP IP/OBS HIGH 50: CPT | Mod: ,,, | Performed by: INTERNAL MEDICINE

## 2022-06-10 PROCEDURE — 80048 BASIC METABOLIC PNL TOTAL CA: CPT | Performed by: NURSE PRACTITIONER

## 2022-06-10 PROCEDURE — 36415 COLL VENOUS BLD VENIPUNCTURE: CPT | Performed by: NURSE PRACTITIONER

## 2022-06-10 PROCEDURE — 97530 THERAPEUTIC ACTIVITIES: CPT | Mod: CQ

## 2022-06-10 PROCEDURE — A9698 NON-RAD CONTRAST MATERIALNOC: HCPCS | Performed by: EMERGENCY MEDICINE

## 2022-06-10 RX ORDER — SODIUM BICARBONATE 650 MG/1
325 TABLET ORAL DAILY
Qty: 15 TABLET | Refills: 0 | Status: SHIPPED | OUTPATIENT
Start: 2022-06-11 | End: 2024-03-20

## 2022-06-10 RX ORDER — SERTRALINE HYDROCHLORIDE 100 MG/1
50 TABLET, FILM COATED ORAL NIGHTLY
Qty: 30 TABLET | Refills: 0 | Status: SHIPPED | OUTPATIENT
Start: 2022-06-10

## 2022-06-10 RX ADMIN — HYDRALAZINE HYDROCHLORIDE 10 MG: 20 INJECTION INTRAMUSCULAR; INTRAVENOUS at 11:06

## 2022-06-10 RX ADMIN — SODIUM CHLORIDE: 0.9 INJECTION, SOLUTION INTRAVENOUS at 04:06

## 2022-06-10 RX ADMIN — HYDROCODONE BITARTRATE AND ACETAMINOPHEN 1 TABLET: 10; 325 TABLET ORAL at 12:06

## 2022-06-10 RX ADMIN — BARIUM SULFATE 70 G: 960 POWDER, FOR SUSPENSION ORAL at 09:06

## 2022-06-10 RX ADMIN — HYDROCODONE BITARTRATE AND ACETAMINOPHEN 1 TABLET: 10; 325 TABLET ORAL at 01:06

## 2022-06-10 RX ADMIN — HYDROCODONE BITARTRATE AND ACETAMINOPHEN 1 TABLET: 10; 325 TABLET ORAL at 05:06

## 2022-06-10 NOTE — PROGRESS NOTES
[facility] - Telemetry ([facility])  Nephrology  Progress Note    Patient Name: Francis Bains  MRN: [id number]  Admission Date: 6/6/2022  Hospital Length of Stay: 3 days  Attending Provider: Kali Charles MD   Primary Care Physician: Bri Garnica MD  Principal Problem:Dehydration    Subjective:     HPI: Noted    Interval History: Pt was seen and examined. Labs and meds reviewed. Discussed with other providers. 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 Pt lives in [place], [place]; and follows with nephrologists in [place]. Pt had a kidney biopsy done on 2012; however pt does snot know the results. Pt is being d/c ed home today, has no new c/o's today.    Review of patient's allergies indicates:   Allergen Reactions    Ace inhibitors Other (See Comments)    Nabumetone Other (See Comments)    Nsaids (non-steroidal anti-inflammatory drug) Other (See Comments)    Sulfa (sulfonamide antibiotics) Hives    Pcn  [penicillins] Rash     Current Facility-Administered Medications   Medication Frequency    0.9%  NaCl infusion Continuous    acetaminophen tablet 650 mg Q8H PRN    carvediloL tablet 6.25 mg BID    clonazePAM tablet 1 mg BID    dextrose 10% bolus 125 mL PRN    dextrose 10% bolus 250 mL PRN    glucagon (human recombinant) injection 1 mg PRN    glucose chewable tablet 16 g PRN    glucose chewable tablet 24 g PRN    hydrALAZINE injection 10 mg Q8H PRN    HYDROcodone-acetaminophen  mg per tablet 1 tablet Q4H PRN    ondansetron injection 4 mg Q8H PRN    pantoprazole injection 40 mg Daily    sertraline tablet 50 mg Daily    sodium bicarbonate tablet 325 mg Daily    traZODone tablet 50 mg Nightly PRN       Objective:     Vital Signs (Most Recent):  Temp: 98.2 °F (36.8 °C) (06/10/22 1130)  Pulse: 104 (06/10/22 1236)  Resp: 16 (06/10/22 1236)  BP: (!) 124/93 (06/10/22 1236)  SpO2: 98 % (06/10/22  1130)  O2 Device (Oxygen Therapy): room air (06/10/22 0824)   Vital Signs (24h Range):  Temp:  [97.9 °F (36.6 °C)-98.2 °F (36.8 °C)] 98.2 °F (36.8 °C)  Pulse:  [] 104  Resp:  [16-20] 16  SpO2:  [96 %-100 %] 98 %  BP: (124-182)/() 124/93     Weight: 91.5 kg (201 lb 11.5 oz) (06/09/22 0029)  Body mass index is 35.73 kg/m².  Body surface area is 2.02 meters squared.    I/O last 3 completed shifts:  In: 1524.4 [P.O.:480; I.V.:1044.4]  Out: 301 [Urine:300; Stool:1]    Physical Exam  Vitals and nursing note reviewed.   Constitutional:       Appearance: Normal appearance.   HENT:      Head: Normocephalic and atraumatic.   Cardiovascular:      Rate and Rhythm: Normal rate and regular rhythm.      Pulses: Normal pulses.      Heart sounds: Normal heart sounds.   Pulmonary:      Effort: Pulmonary effort is normal.      Breath sounds: Normal breath sounds.   Abdominal:      General: Abdomen is flat.      Palpations: Abdomen is soft.      Tenderness: There is no abdominal tenderness.   Musculoskeletal:      Right lower leg: No edema.      Left lower leg: No edema.   Skin:     General: Skin is warm and dry.   Neurological:      General: No focal deficit present.      Mental Status: She is alert and oriented to person, place, and time.   Psychiatric:         Behavior: Behavior normal.       Significant Labs: reviewed  BMP  Lab Results   Component Value Date     06/10/2022    K 4.1 06/10/2022     06/10/2022    CO2 17 (L) 06/10/2022    BUN 68 (H) 06/10/2022    CREATININE 3.7 (H) 06/10/2022    CALCIUM 8.9 06/10/2022    ANIONGAP 14 06/10/2022    ESTGFRAFRICA 18 (A) 06/10/2022    EGFRNONAA 15 (A) 06/10/2022     Lab Results   Component Value Date    WBC 8.42 06/08/2022    HGB 10.1 (L) 06/08/2022    HCT 31.9 (L) 06/08/2022     (H) 06/08/2022     06/08/2022       Significant Imaging: reviewed    Assessment/Plan:     31 y/o female presented with JESSICA on CKD after having diarrea:    Acute renal failure  superimposed on stage 4 chronic kidney disease  s Cr stable, has improved  JESSICA due to GI losses, diarrhea  Metabolic acidosis, due to diarrhea, improved with po bicarbonate    CKD stage 3 at baseline  Cause not clear  Had kidney biopsy done in 2012 in [place] area  Pt and father do not know the biopsy results  Encouraged pt and father to find the results, find our what pathology group the biopsy was sent to to.  Doing a kidney biopsy now may be unhelpful, as it is 10 years later, and fresh injury has resolved, and kidney biopsy sample likely will only show renal scarring by now.    Black stool  Diarrhea  Will defer mgmt  Plans and recommendations:      As discussed above  Total time spent 40 minutes including time needed to review the records, the   patient evaluation, documentation, face-to-face discussion with the patient,   more than 50% of the time was spent on coordination of care and counseling.    Level V visit.      Ileana Osorio MD  Nephrology  [facility] - Telemetry ([facility])

## 2022-06-10 NOTE — PT/OT/SLP PROGRESS
Physical Therapy  Treatment    Francis Bains   MRN: [id number]   Admitting Diagnosis: Dehydration    PT Received On: 06/10/22  PT Start Time: 1015     PT Stop Time: 1055    PT Total Time (min): 40 min       Billable Minutes:  Therapeutic Activity 17 and Neuromuscular Re-education 23    Treatment Type: Treatment  PT/PTA: PTA     PTA Visit Number: 2       General Precautions: Standard, fall  Orthopedic Precautions: N/A   Braces: N/A  Respiratory Status: Room air    Spiritual, Cultural Beliefs, Restoration Practices, Values that Affect Care: no    Subjective:  Communicated with patient's nurseYue, and completed Epic chart review prior to session.  Patient anxious but agreeable to PT session with encouragement.   Patient's mother reports patient has not had any pain medication today.    Pain/Comfort  Pain Rating 1:  (C/O DISCOMFORT WITH MOVEMENT BUT APPEARS TO BE IN SIGNIFICANTLY LESS PAIN THAN PREVIOUS SESSION. MINIMAL CRYING OUT WITH MOVEMENT)  Location - Orientation 1: generalized  Pain Addressed 1: Reposition, Distraction, Other (see comments) (ACTIVITY PACING)    Objective:   Patient found with: telemetry, peripheral IV, PureWick, bed alarm, pressure relief boots    Functional Mobility:  Rolling R/L: Max A of 2 (able to initiate with cross midline reach)    Supine > Sit EOB: Max A of 2 (significantly less crying out in pain and physical resistance; some improvement in ability to assist with transition; increased time to complete)    Forward scoot towards EOB: Total A of 2 (patient very fearful of falling)    Self supported sitting EOB x20 min total focusing on increased tolerance to upright position, core stability, trunk control and CV endurance.  Intermittent verbal cues for upright posture. Minimally resistant and very few complaints when kept in conversation.  Initially required Min-Mod A to maintain sitting balance but progressed to close SBA with increased time.     Sit > supine: Total A of 2 (again noted  significantly less crying out in pain and physical resistance)    Supine scoot towards HOB: Total A of 2 (trendelenburg)    Educated patient on importance of increased tolerance to upright position to promote CV endurance. Encouraged patient to utilize elevated HOB to achieve simulated chair position until she is able to safely complete T/F. Also encouraged patient to give as much assistance as possible when staff are turning or repositioning her as this will greatly reduce the amount of pain experienced with each movement.   Patient's mother requested to be taught how to antonio pressure relief boots. Demonstrated correct use and placement of boots.     Also educated patient throughout session on pursed lip breathing techniques to address SOB which increases with anxiety/anticipation of pain.      AM-PAC 6 CLICK MOBILITY  How much help from another person does this patient currently need?   1 = Unable, Total/Dependent Assistance  2 = A lot, Maximum/Moderate Assistance  3 = A little, Minimum/Contact Guard/Supervision  4 = None, Modified [place]/Independent    Turning over in bed (including adjusting bedclothes, sheets and blankets)?: 2  Sitting down on and standing up from a chair with arms (e.g., wheelchair, bedside commode, etc.): 1  Moving from lying on back to sitting on the side of the bed?: 2  Moving to and from a bed to a chair (including a wheelchair)?: 1  Need to walk in hospital room?: 1  Climbing 3-5 steps with a railing?: 1  Basic Mobility Total Score: 8    AM-PAC Raw Score CMS G-Code Modifier Level of Impairment Assistance   6 % Total / Unable   7 - 9 CM 80 - 100% Maximal Assist   10 - 14 CL 60 - 80% Moderate Assist   15 - 19 CK 40 - 60% Moderate Assist   20 - 22 CJ 20 - 40% Minimal Assist   23 CI 1-20% SBA / CGA   24 CH 0% Independent/ Mod I     Patient left with bed in chair position with all lines intact, call button in reach and mother present.    Assessment:  Francis Bains is a 32  y.o. female with a medical diagnosis of Dehydration and presents with overall decline in functional mobility. Patient would continue to benefit from skilled PT to address functional limitations listed below in order to return to PLOF/decrease caregiver burden. Patient was noted to have demonstrated improvement in session performance today but remains limited by fear and anticipation of pain. Patient required multiple instances of redirection towards task in attempt to reduce anxious behaviors she was demonstrating.     Rehab identified problem list/impairments: Rehab identified problem list/impairments: weakness, impaired endurance, impaired sensation, impaired self care skills, impaired functional mobilty, impaired balance, decreased coordination, decreased upper extremity function, decreased lower extremity function, pain, abnormal tone, decreased ROM, impaired coordination, impaired cardiopulmonary response to activity    Rehab potential is fair.    Activity tolerance: Fair    Discharge recommendations: Discharge Facility/Level of Care Needs: nursing facility, skilled     Barriers to discharge:      Equipment recommendations: Equipment Needed After Discharge: other (see comments) (TBD BY NEXT LEVEL OF CARE)     GOALS:   Multidisciplinary Problems     Physical Therapy Goals        Problem: Physical Therapy    Goal Priority Disciplines Outcome Goal Variances Interventions   Physical Therapy Goal     PT, PT/OT      Description: Patient will be seen a minimal of 3 out of 7 days a week.     LTG to be met by 06/21:     Bed mobility: CGA  Transfers: Min A  Gait: Min A                   PLAN:    Patient to be seen 3 x/week  to address the above listed problems via therapeutic activities, therapeutic exercises, neuromuscular re-education  Plan of Care expires: 06/21/22  Plan of Care reviewed with: patient, mother         06/10/2022

## 2022-06-10 NOTE — SUBJECTIVE & OBJECTIVE
Interval History: Pt was seen and examined. Labs and meds reviewed. Discussed with other providers. 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 Pt lives in [place], [place]; and follows with nephrologists in [place]. Pt had a kidney biopsy done on 2012; however pt does snot know the results. Pt is being d/c ed home today, has no new c/o's today.    Review of patient's allergies indicates:   Allergen Reactions    Ace inhibitors Other (See Comments)    Nabumetone Other (See Comments)    Nsaids (non-steroidal anti-inflammatory drug) Other (See Comments)    Sulfa (sulfonamide antibiotics) Hives    Pcn  [penicillins] Rash     Current Facility-Administered Medications   Medication Frequency    0.9%  NaCl infusion Continuous    acetaminophen tablet 650 mg Q8H PRN    carvediloL tablet 6.25 mg BID    clonazePAM tablet 1 mg BID    dextrose 10% bolus 125 mL PRN    dextrose 10% bolus 250 mL PRN    glucagon (human recombinant) injection 1 mg PRN    glucose chewable tablet 16 g PRN    glucose chewable tablet 24 g PRN    hydrALAZINE injection 10 mg Q8H PRN    HYDROcodone-acetaminophen  mg per tablet 1 tablet Q4H PRN    ondansetron injection 4 mg Q8H PRN    pantoprazole injection 40 mg Daily    sertraline tablet 50 mg Daily    sodium bicarbonate tablet 325 mg Daily    traZODone tablet 50 mg Nightly PRN       Objective:     Vital Signs (Most Recent):  Temp: 98.2 °F (36.8 °C) (06/10/22 1130)  Pulse: 104 (06/10/22 1236)  Resp: 16 (06/10/22 1236)  BP: (!) 124/93 (06/10/22 1236)  SpO2: 98 % (06/10/22 1130)  O2 Device (Oxygen Therapy): room air (06/10/22 0824)   Vital Signs (24h Range):  Temp:  [97.9 °F (36.6 °C)-98.2 °F (36.8 °C)] 98.2 °F (36.8 °C)  Pulse:  [] 104  Resp:  [16-20] 16  SpO2:  [96 %-100 %] 98 %  BP: (124-182)/() 124/93     Weight: 91.5 kg (201 lb 11.5 oz) (06/09/22 0029)  Body mass index is 35.73  kg/m².  Body surface area is 2.02 meters squared.    I/O last 3 completed shifts:  In: 1524.4 [P.O.:480; I.V.:1044.4]  Out: 301 [Urine:300; Stool:1]    Physical Exam  Vitals and nursing note reviewed.   Constitutional:       Appearance: Normal appearance.   HENT:      Head: Normocephalic and atraumatic.   Cardiovascular:      Rate and Rhythm: Normal rate and regular rhythm.      Pulses: Normal pulses.      Heart sounds: Normal heart sounds.   Pulmonary:      Effort: Pulmonary effort is normal.      Breath sounds: Normal breath sounds.   Abdominal:      General: Abdomen is flat.      Palpations: Abdomen is soft.      Tenderness: There is no abdominal tenderness.   Musculoskeletal:      Right lower leg: No edema.      Left lower leg: No edema.   Skin:     General: Skin is warm and dry.   Neurological:      General: No focal deficit present.      Mental Status: She is alert and oriented to person, place, and time.   Psychiatric:         Behavior: Behavior normal.       Significant Labs: reviewed  BMP  Lab Results   Component Value Date     06/10/2022    K 4.1 06/10/2022     06/10/2022    CO2 17 (L) 06/10/2022    BUN 68 (H) 06/10/2022    CREATININE 3.7 (H) 06/10/2022    CALCIUM 8.9 06/10/2022    ANIONGAP 14 06/10/2022    ESTGFRAFRICA 18 (A) 06/10/2022    EGFRNONAA 15 (A) 06/10/2022     Lab Results   Component Value Date    WBC 8.42 06/08/2022    HGB 10.1 (L) 06/08/2022    HCT 31.9 (L) 06/08/2022     (H) 06/08/2022     06/08/2022       Significant Imaging: reviewed

## 2022-06-10 NOTE — PLAN OF CARE
Problem: Adult Inpatient Plan of Care  Goal: Plan of Care Review  Outcome: Ongoing, Progressing     Problem: Adult Inpatient Plan of Care  Goal: Patient-Specific Goal (Individualized)  Outcome: Ongoing, Progressing     Problem: Adult Inpatient Plan of Care  Goal: Absence of Hospital-Acquired Illness or Injury  Outcome: Ongoing, Progressing     Problem: Adult Inpatient Plan of Care  Goal: Optimal Comfort and Wellbeing  Outcome: Ongoing, Progressing     Problem: Adult Inpatient Plan of Care  Goal: Readiness for Transition of Care  Outcome: Ongoing, Progressing     Problem: Fluid and Electrolyte Imbalance (Acute Kidney Injury/Impairment)  Goal: Fluid and Electrolyte Balance  Outcome: Ongoing, Progressing     Problem: Skin Injury Risk Increased  Goal: Skin Health and Integrity  Outcome: Ongoing, Progressing     Problem: Family Process Interrupted  Goal: Effective Family Function and Communication  Outcome: Ongoing, Progressing

## 2022-06-10 NOTE — PLAN OF CARE
[facility] - Telemetry (Hospital)  Discharge Final Note    Primary Care Provider: Bri Garnica MD    Expected Discharge Date: 6/10/2022    Final Discharge Note (most recent)     Final Note - 06/10/22 1509        Final Note    Assessment Type Final Discharge Note     Anticipated Discharge Disposition Home or Self Care        Post-Acute Status    Discharge Delays None known at this time                 Important Message from Medicare             Contact Info     Bri Garnica MD   Specialty: Family Medicine   Relationship: PCP - General    [address] [place] [address]  [address] [place] [id number]   Phone: [phone]       Next Steps: Follow up in 3 day(s)    Instructions: for hospital follow-up        No further CM needs

## 2022-06-10 NOTE — PROGRESS NOTES
Subjective:       Patient ID: Francis Bains is a 32 y.o. female.    Chief Complaint: No chief complaint on file.      HPI   History of Present Illness:   Francis Bains 32 y.o. female presents today with   Dr Cassie Tate and Eric Aquino  -Elevated CRP and Sed rate  05/18/22. Myasthenia gravis collected 05/19/22, no result found.  Cochlear implant   Anemia  CKD 4    Past Medical History:   Diagnosis Date    Cerebral palsy     Hearing impaired person     Hypertension     Osteopenia determined by x-ray     Oswaldo syndrome      Family History   Problem Relation Age of Onset    Breast cancer Neg Hx     Colon cancer Neg Hx     Ovarian cancer Neg Hx      Social History     Socioeconomic History    Marital status: Single   Tobacco Use    Smoking status: Former Smoker     Packs/day: 0.25     Years: 9.00     Pack years: 2.25    Smokeless tobacco: Never Used   Substance and Sexual Activity    Alcohol use: Yes     Comment: Social    Drug use: Yes     Types: Marijuana    Sexual activity: Yes     Partners: Male     Birth control/protection: Inserts     Comment: Single.     Facility-Administered Encounter Medications as of 6/10/2022   Medication Dose Route Frequency Provider Last Rate Last Admin    0.9%  NaCl infusion   Intravenous Continuous Demetrius Velazquez  mL/hr at 06/10/22 0643 Rate Verify at 06/10/22 0643    acetaminophen tablet 650 mg  650 mg Oral Q8H PRN Jolene Weir NP   650 mg at 06/08/22 2015    [COMPLETED] barium sulfate (EZ-PAQUE 96 %) suspension 70 g  70 g Oral ONCE PRN Kali Charles MD   70 g at 06/10/22 0941    carvediloL tablet 6.25 mg  6.25 mg Oral BID Jolene Weir NP   6.25 mg at 06/09/22 2134    clonazePAM tablet 1 mg  1 mg Oral BID Maggie Machado NP   1 mg at 06/09/22 2134    dextrose 10% bolus 125 mL  12.5 g Intravenous PRN Eric Manning MD        dextrose 10% bolus 250 mL  25 g Intravenous PRN Eric Manning MD        glucagon (human recombinant)  injection 1 mg  1 mg Intramuscular PRN Jolene Weir NP        glucose chewable tablet 16 g  16 g Oral PRN Jolene Weir NP        glucose chewable tablet 24 g  24 g Oral PRN Joleen Weir NP        hydrALAZINE injection 10 mg  10 mg Intravenous Q8H PRN Maggie Machado NP   10 mg at 06/10/22 1133    HYDROcodone-acetaminophen  mg per tablet 1 tablet  1 tablet Oral Q4H PRN Mal Stern NP   1 tablet at 06/10/22 1236    ondansetron injection 4 mg  4 mg Intravenous Q8H PRN Jolene Weir NP        pantoprazole injection 40 mg  40 mg Intravenous Daily Jolene Weir NP   40 mg at 06/08/22 0822    sertraline tablet 50 mg  50 mg Oral Daily Jolene Weir NP   50 mg at 06/09/22 0802    sodium bicarbonate tablet 325 mg  325 mg Oral Daily Demetrius Velazquez NP   325 mg at 06/09/22 1642    [COMPLETED] sodium bicarbonate-citric acid-simethicone (EZ GAS II) 2.21-1.53 gram/4 gram oral granules 4 g  1 packet Oral ONCE PRN Kali Charles MD   4 g at 06/10/22 0941    traZODone tablet 50 mg  50 mg Oral Nightly PRN Jolene Weir NP   50 mg at 06/09/22 2135     Outpatient Encounter Medications as of 6/10/2022   Medication Sig Dispense Refill    alendronate (FOSAMAX) 70 MG tablet Take 1 tablet (70 mg total) by mouth every 7 days. 4 tablet 11    amlodipine (NORVASC) 10 MG tablet Take 10 mg by mouth once daily.  0    carvedilol (COREG) 6.25 MG tablet 12.5 mg.   2    ergocalciferol (ERGOCALCIFEROL) 50,000 unit Cap Vitamin D2 50,000 unit capsule      estrogen, conjugated,-medroxyprogesterone 0.625-2.5mg (PREMPRO) 0.625-2.5 mg per tablet Take 1 tablet by mouth once daily. 28 tablet 9    famotidine (PEPCID) 20 MG tablet TAKE 1 TABLET PO TWICE A DAY  4    ondansetron (ZOFRAN-ODT) 4 MG TbDL Take 1 tablet (4 mg total) by mouth every 8 (eight) hours as needed (nausea). 20 tablet 0    pantoprazole (PROTONIX) 40 MG tablet TK 1 T PO  D      sertraline (ZOLOFT) 100 MG tablet Take  0.5 tablets (50 mg total) by mouth every evening. 30 tablet 0    [START ON 6/11/2022] sodium bicarbonate 650 MG tablet Take 0.5 tablets (325 mg total) by mouth once daily. 15 tablet 0    traZODone (DESYREL) 50 MG tablet       [DISCONTINUED] clonazePAM (KLONOPIN) 1 MG tablet Take 1 tablet (1 mg total) by mouth daily as needed for Anxiety. 30 tablet 1    [DISCONTINUED] famotidine (PEPCID) 20 MG tablet Take 1 tablet (20 mg total) by mouth 2 (two) times daily. 60 tablet 0    [DISCONTINUED] losartan (COZAAR) 50 MG tablet losartan 50 mg tablet      [DISCONTINUED] sertraline (ZOLOFT) 100 MG tablet          Review of Systems    Objective:      There were no vitals taken for this visit.  Physical Exam    Results for orders placed or performed during the hospital encounter of 06/06/22   CBC auto differential   Result Value Ref Range    WBC 11.32 3.90 - 12.70 K/uL    RBC 3.60 (L) 4.00 - 5.40 M/uL    Hemoglobin 11.4 (L) 12.0 - 16.0 g/dL    Hematocrit 34.1 (L) 37.0 - 48.5 %    MCV 95 82 - 98 fL    MCH 31.7 (H) 27.0 - 31.0 pg    MCHC 33.4 32.0 - 36.0 g/dL    RDW 13.4 11.5 - 14.5 %    Platelets 214 150 - 450 K/uL    MPV 9.7 9.2 - 12.9 fL    Immature Granulocytes 0.9 (H) 0.0 - 0.5 %    Gran # (ANC) 9.6 (H) 1.8 - 7.7 K/uL    Immature Grans (Abs) 0.10 (H) 0.00 - 0.04 K/uL    Lymph # 1.0 1.0 - 4.8 K/uL    Mono # 0.6 0.3 - 1.0 K/uL    Eos # 0.0 0.0 - 0.5 K/uL    Baso # 0.02 0.00 - 0.20 K/uL    nRBC 0 0 /100 WBC    Gran % 84.5 (H) 38.0 - 73.0 %    Lymph % 8.8 (L) 18.0 - 48.0 %    Mono % 5.4 4.0 - 15.0 %    Eosinophil % 0.2 0.0 - 8.0 %    Basophil % 0.2 0.0 - 1.9 %    Differential Method Automated    Comprehensive metabolic panel   Result Value Ref Range    Sodium 132 (L) 136 - 145 mmol/L    Potassium 4.3 3.5 - 5.1 mmol/L    Chloride 93 (L) 95 - 110 mmol/L    CO2 15 (L) 23 - 29 mmol/L    Glucose 100 70 - 110 mg/dL    BUN 88 (H) 6 - 20 mg/dL    Creatinine 4.8 (H) 0.5 - 1.4 mg/dL    Calcium 10.0 8.7 - 10.5 mg/dL    Total Protein 8.3  6.0 - 8.4 g/dL    Albumin 3.6 3.5 - 5.2 g/dL    Total Bilirubin 0.4 0.1 - 1.0 mg/dL    Alkaline Phosphatase 79 55 - 135 U/L    AST 59 (H) 10 - 40 U/L    ALT 31 10 - 44 U/L    Anion Gap 24 (H) 8 - 16 mmol/L    eGFR if African American 13 (A) >60 mL/min/1.73 m^2    eGFR if non African American 11 (A) >60 mL/min/1.73 m^2   Troponin I #1   Result Value Ref Range    Troponin I 0.022 0.000 - 0.026 ng/mL   BNP   Result Value Ref Range    BNP <10 0 - 99 pg/mL   Urinalysis, Reflex to Urine Culture Urine, Clean Catch    Specimen: Urine   Result Value Ref Range    Specimen UA Urine, Catheterized     Color, UA Yellow Yellow, Straw, Gloria    Appearance, UA Clear Clear    pH, UA 6.0 5.0 - 8.0    Specific Gravity, UA 1.010 1.005 - 1.030    Protein, UA Negative Negative    Glucose, UA Negative Negative    Ketones, UA Trace (A) Negative    Bilirubin (UA) 1+ (A) Negative    Occult Blood UA 1+ (A) Negative    Nitrite, UA Negative Negative    Urobilinogen, UA Negative <2.0 EU/dL    Leukocytes, UA Negative Negative   Drug screen panel, emergency   Result Value Ref Range    Benzodiazepines Presumptive Positive (A) Negative    Methadone metabolites Negative Negative    Cocaine (Metab.) Negative Negative    Opiate Scrn, Ur Negative Negative    Barbiturate Screen, Ur Negative Negative    Amphetamine Screen, Ur Negative Negative    THC Presumptive Positive (A) Negative    Phencyclidine Negative Negative    Creatinine, Urine 88.8 15.0 - 325.0 mg/dL    Toxicology Information SEE COMMENT    Occult blood x 1, stool    Specimen: Stool   Result Value Ref Range    Occult Blood Positive (A) Negative   Urinalysis Microscopic   Result Value Ref Range    RBC, UA 2 0 - 4 /hpf    Microscopic Comment SEE COMMENT    TSH   Result Value Ref Range    TSH 4.984 (H) 0.400 - 4.000 uIU/mL   T4, Free   Result Value Ref Range    Free T4 1.08 0.71 - 1.51 ng/dL   Basic Metabolic Panel (BMP)   Result Value Ref Range    Sodium 136 136 - 145 mmol/L    Potassium 3.8 3.5  - 5.1 mmol/L    Chloride 102 95 - 110 mmol/L    CO2 15 (L) 23 - 29 mmol/L    Glucose 121 (H) 70 - 110 mg/dL    BUN 83 (H) 6 - 20 mg/dL    Creatinine 3.9 (H) 0.5 - 1.4 mg/dL    Calcium 8.7 8.7 - 10.5 mg/dL    Anion Gap 19 (H) 8 - 16 mmol/L    eGFR if African American 17 (A) >60 mL/min/1.73 m^2    eGFR if non African American 14 (A) >60 mL/min/1.73 m^2   CBC with Automated Differential   Result Value Ref Range    WBC 8.31 3.90 - 12.70 K/uL    RBC 3.05 (L) 4.00 - 5.40 M/uL    Hemoglobin 9.7 (L) 12.0 - 16.0 g/dL    Hematocrit 30.1 (L) 37.0 - 48.5 %    MCV 99 (H) 82 - 98 fL    MCH 31.8 (H) 27.0 - 31.0 pg    MCHC 32.2 32.0 - 36.0 g/dL    RDW 13.3 11.5 - 14.5 %    Platelets 174 150 - 450 K/uL    MPV 9.4 9.2 - 12.9 fL    Immature Granulocytes 0.6 (H) 0.0 - 0.5 %    Gran # (ANC) 6.5 1.8 - 7.7 K/uL    Immature Grans (Abs) 0.05 (H) 0.00 - 0.04 K/uL    Lymph # 1.1 1.0 - 4.8 K/uL    Mono # 0.6 0.3 - 1.0 K/uL    Eos # 0.0 0.0 - 0.5 K/uL    Baso # 0.03 0.00 - 0.20 K/uL    nRBC 0 0 /100 WBC    Gran % 78.7 (H) 38.0 - 73.0 %    Lymph % 12.6 (L) 18.0 - 48.0 %    Mono % 7.2 4.0 - 15.0 %    Eosinophil % 0.5 0.0 - 8.0 %    Basophil % 0.4 0.0 - 1.9 %    Differential Method Automated    Basic Metabolic Panel (BMP)   Result Value Ref Range    Sodium 138 136 - 145 mmol/L    Potassium 3.8 3.5 - 5.1 mmol/L    Chloride 106 95 - 110 mmol/L    CO2 18 (L) 23 - 29 mmol/L    Glucose 133 (H) 70 - 110 mg/dL    BUN 60 (H) 6 - 20 mg/dL    Creatinine 2.7 (H) 0.5 - 1.4 mg/dL    Calcium 9.1 8.7 - 10.5 mg/dL    Anion Gap 14 8 - 16 mmol/L    eGFR if African American 26 (A) >60 mL/min/1.73 m^2    eGFR if non African American 22 (A) >60 mL/min/1.73 m^2   CBC with Automated Differential   Result Value Ref Range    WBC 8.42 3.90 - 12.70 K/uL    RBC 3.17 (L) 4.00 - 5.40 M/uL    Hemoglobin 10.1 (L) 12.0 - 16.0 g/dL    Hematocrit 31.9 (L) 37.0 - 48.5 %     (H) 82 - 98 fL    MCH 31.9 (H) 27.0 - 31.0 pg    MCHC 31.7 (L) 32.0 - 36.0 g/dL    RDW 13.5 11.5 - 14.5  %    Platelets 164 150 - 450 K/uL    MPV 9.6 9.2 - 12.9 fL    Immature Granulocytes 0.4 0.0 - 0.5 %    Gran # (ANC) 6.8 1.8 - 7.7 K/uL    Immature Grans (Abs) 0.03 0.00 - 0.04 K/uL    Lymph # 0.8 (L) 1.0 - 4.8 K/uL    Mono # 0.7 0.3 - 1.0 K/uL    Eos # 0.1 0.0 - 0.5 K/uL    Baso # 0.02 0.00 - 0.20 K/uL    nRBC 0 0 /100 WBC    Gran % 81.1 (H) 38.0 - 73.0 %    Lymph % 9.5 (L) 18.0 - 48.0 %    Mono % 8.1 4.0 - 15.0 %    Eosinophil % 0.7 0.0 - 8.0 %    Basophil % 0.2 0.0 - 1.9 %    Differential Method Automated    C-reactive protein   Result Value Ref Range    CRP 14.5 (H) 0.0 - 8.2 mg/L   Sedimentation rate   Result Value Ref Range    Sed Rate 68 (H) 0 - 20 mm/Hr   Urinalysis   Result Value Ref Range    Specimen UA Urine, Clean Catch     Color, UA Yellow Yellow, Straw, Gloria    Appearance, UA Clear Clear    pH, UA 6.0 5.0 - 8.0    Specific Gravity, UA 1.010 1.005 - 1.030    Protein, UA Negative Negative    Glucose, UA Negative Negative    Ketones, UA Negative Negative    Bilirubin (UA) Negative Negative    Occult Blood UA 2+ (A) Negative    Nitrite, UA Negative Negative    Urobilinogen, UA Negative <2.0 EU/dL    Leukocytes, UA 1+ (A) Negative   Urinalysis Microscopic   Result Value Ref Range    RBC, UA 25 (H) 0 - 4 /hpf    WBC, UA 3 0 - 5 /hpf    Bacteria Rare None-Occ /hpf    Squam Epithel, UA 2 /hpf    Microscopic Comment SEE COMMENT    Basic Metabolic Panel   Result Value Ref Range    Sodium 134 (L) 136 - 145 mmol/L    Potassium 4.6 3.5 - 5.1 mmol/L    Chloride 103 95 - 110 mmol/L    CO2 19 (L) 23 - 29 mmol/L    Glucose 77 70 - 110 mg/dL    BUN 68 (H) 6 - 20 mg/dL    Creatinine 3.6 (H) 0.5 - 1.4 mg/dL    Calcium 8.9 8.7 - 10.5 mg/dL    Anion Gap 12 8 - 16 mmol/L    eGFR if African American 18 (A) >60 mL/min/1.73 m^2    eGFR if non African American 16 (A) >60 mL/min/1.73 m^2   Basic Metabolic Panel   Result Value Ref Range    Sodium 137 136 - 145 mmol/L    Potassium 4.1 3.5 - 5.1 mmol/L    Chloride 106 95 - 110  mmol/L    CO2 17 (L) 23 - 29 mmol/L    Glucose 72 70 - 110 mg/dL    BUN 68 (H) 6 - 20 mg/dL    Creatinine 3.7 (H) 0.5 - 1.4 mg/dL    Calcium 8.9 8.7 - 10.5 mg/dL    Anion Gap 14 8 - 16 mmol/L    eGFR if African American 18 (A) >60 mL/min/1.73 m^2    eGFR if non African American 15 (A) >60 mL/min/1.73 m^2   POCT COVID-19 Rapid Screening   Result Value Ref Range    POC Rapid COVID Negative Negative     Acceptable Yes    Type & Screen   Result Value Ref Range    Group & Rh O POS     Indirect Miguel Ángel NEG      Assessment:       No diagnosis found.    Plan:   There are no diagnoses linked to this encounter.    Bri Garnica MD

## 2022-06-10 NOTE — TELEPHONE ENCOUNTER
Returned call to patients mother, informed patient still in hospital and Dr Garnica can not place orders. Orders must come from hospital staff while inpatient. Mother states patient will being discharged today. Dr Garnica notified.

## 2022-06-10 NOTE — NURSING
Heel boots placed on pt's feet. R-foot appears as though she has foot drop. Educated pt & mother on purpose of heel boots. Pt was turned to side & wedge was placed behind her. Pt now c/o discomfort w/ heel boots & wedge. Again educated pt on importance of heel boots & other ways to prevent pressure injury. Wedge was removed from back by mother. When pt was turned pillows were placed behind her because mother refused wedge. Pt now wanting to take off heel boots.    Tech attempted to do vitals. Pt began crying & stated she did not want her vitals done. Educated pt on importance of having vitals. Continued to refuse BP.

## 2022-06-10 NOTE — PLAN OF CARE
Mother requested to speak to CM. Spoke with mother and father at bedside. Discussed DC recs of SNF placement, which family refused. Advised home health options are limited due to patient's insurance and no accepting home health agency. Mother states she has a WC, BSC, and other equipment at home. Information given on how to change medicaid plans per request. Mother requests provider to come to the bedside to answer clarify questions asked earlier. Provider notified of request.

## 2022-06-10 NOTE — ASSESSMENT & PLAN NOTE
s Cr stable, has improved  JESSICA due to GI losses, diarrhea  Metabolic acidosis, due to diarrhea, improved with po bicarbonate    CKD stage 3 at baseline  Cause not clear  Had kidney biopsy done in 2012 in [place] area  Pt and father do not know the biopsy results  Encouraged pt and father to find the results, find our what pathology group the biopsy was sent to to.  Doing a kidney biopsy now may be unhelpful, as it is 10 years later, and fresh injury has resolved, and kidney biopsy sample likely will only show renal scarring by now.

## 2022-06-10 NOTE — PLAN OF CARE
Discharge orders complete. AVS printed, education provided. Midline and telemetry box removed per order. Medication delivered to patients' bedside. Ambulance for transportation requested around 1600, ETA [id number].

## 2022-06-10 NOTE — NURSING
Hospital Medicine team A made aware that this patient has c/o pain 10/10, currently NPO, no prn IV orders. Also made aware that patient's mother would like a psychiatry consult for the patient.

## 2022-06-10 NOTE — PT/OT/SLP PROGRESS
Physical Therapy      Patient Name:  Francis Bains   MRN:  [id number]    Patient refused physical therapy today. An  was present to ensure clear communication. Ms. Bains refused due to having a midline recently place and because she was cold. I offered PROM and to assist her with sit up, however she continued to decline. I attempted to call her primary contact #[phone] but no one answered and the voice mail was not set up. Will attempt treatment tomorrow.    Domenica Holman PTA ( treatment attempted at ~18:00)

## 2022-06-11 NOTE — DISCHARGE SUMMARY
[facility]  Discharge Summary      Patient Name: Francis Bains  MRN: [id number]  Patient Class: IP- Inpatient  Admission Date: 6/6/2022  Hospital Length of Stay: 3 days  Discharge Date and Time: 6/10/2022 05:00 PM  Attending Physician: Lisa att. providers found   Discharging Provider: Jolene Weir NP  Primary Care Provider: Bri Garnica MD      HPI:   Francis Bains is a 32 y.o. female patient with a PMHx of cerbral palsy, kidney disease, HTN, and ana maria syndrome who presents to the Emergency Department for evaluation of hypotension. Pt's father reports that pt was extremely weak and lightheaded 2 weeks ago and they went to the ED in [facility]. She was admitted to the hospital for 2 nights for severe dehydration and decreased kidney function. Pt was able to walk when she was first admitted, but has been unable to walk since being discharged. Pt's father reports that she has been getting weaker each day and is now barely able to move her arms. He also reports that pt has been getting sicker since February. Associated sxs include black stool, dizziness, nausea, vomiting, decreased appetite, and decreased urine. Patient denies any fever, dysuria, SOB, CP, and all other sxs at this time. In the ED pt was found to be hypotensive, chloride 93, CO2 15, Anion gap 24, BUN 88, Creatinine 4.8; UDS + for benzo and THC, + occult blood; CXR negative. Patient received 2L of NS in the ED. Patient placed in observation for dehydration and JESSICA under the care of hospital medicine. Of note pt mother reports pt has been leaving with her grandmother who has Munchausen syndrome.         * No surgery found *      Hospital Course:   6/7/22 No acute events overnight. The patient went for a CT ABD/pelvis which showed no acute abnormality. JESSICA is improved overnight with IV hydraation. Will continue IV fluids and monitor renal function. The patient reports blsck stool recently but none since  admission. H/H today is 9.7/30.1. Will continue to monitor H/H and will consult GI for any further drop in H/H or further melena. Continue current management.     As of 6/8/2022, vital signs and labs stable. H/H trending up 10.1/31.9 today, Pt and mother reports decrease strength over last 2 weeks. Neurology consulted. JESSICA improved, however she continue to experience metabolic acidosis. Nephrology consulted. PT/OT recommend SNF placement, however patient and family refuses. Will follow.     As of 6/9/2021, Nephrology and Neurology recommendations noted.  PT/OT recommmend SNF placement, however patient and family. GI consulted for dysphagia in the setting of resetting of dehydration, recommend esophagram. Radiology unable to do Esophagram today, plan for procedure on tomorrow. As of 6/10/22 renal function stable. Esophagram showed no evidence of a stricture or obstruction. Dr. Charles at bedside discussing the findings and treatment course. Vital signs stable. Labs stable. Patient seen and examined on the date of discharge and found stable for discharge. Patient to follow-up with her PCP in 3 days for hospital follow-up.        Goals of Care Treatment Preferences:  Code Status: Full Code      Consults:   Consults (From admission, onward)        Status Ordering Provider     Inpatient consult to Gastroenterology  Once        Provider:  Susan Valenzuela MD    Completed LNIDA POOLE     Inpatient consult to Nephrology  Once        Provider:  Gerber Pappas MD    Completed LINDA POOLE     Inpatient consult to Telemedicine-General Neurology  Once        Provider:  Joshua Montes MD    Completed LINDA POOLE     Inpatient consult to Social Work/Case Management  Once        Provider:  (Not yet assigned)    Completed MELIZA AMEZCUA          No new Assessment & Plan notes have been filed under this hospital service since the last note was generated.  Service: Hospital Medicine    Final Active  Diagnoses:    Diagnosis Date Noted POA    PRINCIPAL PROBLEM:  Dehydration [E86.0] 05/19/2022 Yes    Esophageal dysphagia [R13.19] 06/09/2022 Yes    N&V (nausea and vomiting) [R11.2] 06/09/2022 Yes    Debility [R53.81] 06/06/2022 Yes    Black stool [K92.1] 06/06/2022 Yes    HTN (hypertension) [I10] 05/19/2022 Yes    Major depression [F32.9] 05/19/2022 Yes    Acute renal failure superimposed on stage 4 chronic kidney disease [N17.9, N18.4] 05/19/2022 Yes      Problems Resolved During this Admission:       Discharged Condition: stable    Disposition: Home or Self Care    Follow Up:   Follow-up Information     Bri Garnica MD Follow up in 3 day(s).    Specialty: Family Medicine  Why: for hospital follow-up  Contact information:  [address] [facility]  [address] [place] [id number] [phone]                       Patient Instructions:      Notify your health care provider if you experience any of the following:  temperature >100.4     Notify your health care provider if you experience any of the following:  persistent nausea and vomiting or diarrhea     Notify your health care provider if you experience any of the following:  severe uncontrolled pain     Activity as tolerated       Significant Diagnostic Studies: Labs:   BMP:   Recent Labs   Lab 06/09/22  1742 06/10/22  1015   GLU 77 72   * 137   K 4.6 4.1    106   CO2 19* 17*   BUN 68* 68*   CREATININE 3.6* 3.7*   CALCIUM 8.9 8.9   , CMP   Recent Labs   Lab 06/09/22 1742 06/10/22  1015   * 137   K 4.6 4.1    106   CO2 19* 17*   GLU 77 72   BUN 68* 68*   CREATININE 3.6* 3.7*   CALCIUM 8.9 8.9   ANIONGAP 12 14   ESTGFRAFRICA 18* 18*   EGFRNONAA 16* 15*   , CBC No results for input(s): WBC, HGB, HCT, PLT in the last 48 hours. and All labs within the past 24 hours have been reviewed  Microbiology: Blood Culture No results found for: LABBLOO    Pending Diagnostic Studies:     None         Medications:  Reconciled Home Medications:       Medication List      START taking these medications    sodium bicarbonate 650 MG tablet  Take 0.5 tablets (325 mg total) by mouth once daily.        CHANGE how you take these medications    famotidine 20 MG tablet  Commonly known as: PEPCID  TAKE 1 TABLET PO TWICE A DAY  What changed: Another medication with the same name was removed. Continue taking this medication, and follow the directions you see here.     sertraline 100 MG tablet  Commonly known as: ZOLOFT  Take 0.5 tablets (50 mg total) by mouth every evening.  What changed:   · how much to take  · how to take this  · when to take this        CONTINUE taking these medications    alendronate 70 MG tablet  Commonly known as: FOSAMAX  Take 1 tablet (70 mg total) by mouth every 7 days.     amLODIPine 10 MG tablet  Commonly known as: NORVASC  Take 10 mg by mouth once daily.     carvediloL 6.25 MG tablet  Commonly known as: COREG  12.5 mg.     ergocalciferol 50,000 unit Cap  Commonly known as: ERGOCALCIFEROL  Vitamin D2 50,000 unit capsule     ondansetron 4 MG Tbdl  Commonly known as: ZOFRAN-ODT  Take 1 tablet (4 mg total) by mouth every 8 (eight) hours as needed (nausea).     pantoprazole 40 MG tablet  Commonly known as: PROTONIX  TK 1 T PO  D     PREMPRO 0.625-2.5 mg per tablet  Generic drug: estrogen (conjugated)-medroxyprogesterone 0.625-2.5mg  Take 1 tablet by mouth once daily.     traZODone 50 MG tablet  Commonly known as: DESYREL        STOP taking these medications    clonazePAM 1 MG tablet  Commonly known as: KlonoPIN     losartan 50 MG tablet  Commonly known as: COZAAR            Indwelling Lines/Drains at time of discharge:   Lines/Drains/Airways     Drain  Duration           Female External Urinary Catheter 06/08/22 0317 3 days                Time spent on the discharge of patient: 60 minutes         Jolene Weir NP  Department of Hospital Medicine  [facility] - Telemetry ([facility])

## 2022-06-17 ENCOUNTER — TELEPHONE (OUTPATIENT)
Dept: PRIMARY CARE CLINIC | Facility: CLINIC | Age: 32
End: 2022-06-17
Payer: MEDICAID

## 2022-06-17 NOTE — TELEPHONE ENCOUNTER
"Placed call to Parent of Nette Blanco mom inquired about papers from Social Security Office stating that we are to receive incoming fax to fill out regarding patients physical condition. Informed mom that fax was not received, verified fax number [phone]. Mom then went on to state that patient health was declining c/o severe diarrhea, dehydration,and Pain  offered to schedule hospital f/u mom "declined" due to her health isssues patient was discharged on 6/10 mom was informed that f/u was needed. Mom requested medication w/o f/u Mom was told that would not be possible. Per provider recommendation she was advised to give imodium, clear liquids, and monitor for increased signs of dehydration... REPORT TO ER.... MOM VOICED UNDERSTANDING                 ----- Message from Kelsie Tomlin sent at 6/17/2022  1:52 PM CDT -----  Contact: mom/[phone]  The mother of Francis would like you to give her a call back. You will be receiving a fax from [employer].   Ms Casillas is from the social security office. They will be asking about her physical condition a capability determination form. Please call the mother the daughter is in severe pain and wishes to speak with the doctor or the nurse her daughter needs some type of medication.        Thanks KL       "

## 2022-06-21 ENCOUNTER — TELEPHONE (OUTPATIENT)
Dept: PRIMARY CARE CLINIC | Facility: CLINIC | Age: 32
End: 2022-06-21
Payer: MEDICAID

## 2022-06-21 NOTE — TELEPHONE ENCOUNTER
Returned call spoke with Nette informed of missed appointment, also informed patient has to come in for in clinic appointment, hospital follow up and eval for home health PT, OT and Long term care giver. Nette voiced understanding.

## 2022-06-22 ENCOUNTER — TELEPHONE (OUTPATIENT)
Dept: PRIMARY CARE CLINIC | Facility: CLINIC | Age: 32
End: 2022-06-22
Payer: MEDICAID

## 2022-06-22 NOTE — TELEPHONE ENCOUNTER
Returned call to Nette who stated she is not able to bring patient into the clinic for appointment due to her medical condition. Nette states her back is hurt and she is unable to get patient in the wheelchair.  is working out of town.  Advised her Dr Garnica is out of the clinic today but I would give her the message upon her return.     ----- Message from Lissa Lora sent at 6/22/2022  2:39 PM CDT -----  Contact: [phone] Nette(mother)  Nette is calling about a PA for home health physical therapy & OT. Nette states she is not able to get the pt to the clinic due to being wheelchair bound. Nette states she just needs a PA sent to the insurance company so the pt can start receiving care. Nette is requesting a call back from Arina Keene LPN.

## 2022-06-23 ENCOUNTER — TELEPHONE (OUTPATIENT)
Dept: PRIMARY CARE CLINIC | Facility: CLINIC | Age: 32
End: 2022-06-23
Payer: MEDICAID

## 2022-06-23 NOTE — TELEPHONE ENCOUNTER
Patient informed that virtual appt is scheduled for 7/13 bess 9am voiced understanding     ----- Message from Bri Garnica MD sent at 6/23/2022  3:42 PM CDT -----  Contact: Mena with [facility] [employer] @ [phone]  Patient missed last appt and will need an appt, ok for virtual if she cannot come into the clinic.   Please see request below and check with pt and insurance. I'm not sure what her insurance can cover.  ----- Message -----  From: Arina Keene LPN  Sent: 6/22/2022   4:49 PM CDT  To: Bri Garnica MD        Returned call to Nette attempted to schedule appointment. Nette stated she is not able to bring patient into the clinic for appointment due to her medical condition. Nette states her back is hurt and she is unable to get patient in the wheelchair.  is working out of town.  Advised her Dr Garnica is out of the clinic today but I would give her the message upon her return.       ----- Message -----  From: Francine Smith  Sent: 6/22/2022   3:18 PM CDT  To: Nahun Gregory Staff    Patient is requesting a home care giver but it requires an Authorization. Please request.

## 2022-07-21 ENCOUNTER — TELEPHONE (OUTPATIENT)
Dept: PRIMARY CARE CLINIC | Facility: CLINIC | Age: 32
End: 2022-07-21
Payer: MEDICAID

## 2022-07-21 NOTE — TELEPHONE ENCOUNTER
Returned call to patient per Dr Garnica. Spoke with mother concerning request for breast pump. Mother stated order is wrong daughter does not need a breast pump.      ----- Message from Sheyla Alfaro sent at 7/21/2022  8:32 AM CDT -----  Contact: Henri  Request a call concerning a fax sent on the pt digital breast pump, no additional info given and can be reached at [phone]///[id number]

## 2022-07-25 ENCOUNTER — TELEPHONE (OUTPATIENT)
Dept: PRIMARY CARE CLINIC | Facility: CLINIC | Age: 32
End: 2022-07-25
Payer: MEDICAID

## 2022-07-25 NOTE — TELEPHONE ENCOUNTER
Spoke with Nette informed her Dr Garnica was out of clinic and will return on 08/01/2022 offered appointment for patient to come in for evaluation appointment refused as mother stated patient was unable to come in to the clinic.

## 2022-07-26 ENCOUNTER — PATIENT MESSAGE (OUTPATIENT)
Dept: PRIMARY CARE CLINIC | Facility: CLINIC | Age: 32
End: 2022-07-26
Payer: MEDICAID

## 2022-08-02 ENCOUNTER — TELEPHONE (OUTPATIENT)
Dept: PRIMARY CARE CLINIC | Facility: CLINIC | Age: 32
End: 2022-08-02
Payer: MEDICAID

## 2022-08-02 NOTE — TELEPHONE ENCOUNTER
Called patient appointment scheduled. Shannen voiced understanding.      ----- Message from Dax Clark sent at 8/2/2022 12:44 PM CDT -----  Contact: shannen/mom  Shannen would like a call back at [phone] in regards to getting home health for the patient, blood pressure reader and the patient also needs a hospital bed as well. Patient isn't able to sit up without feeling nausea and disorientation.  Thanks

## 2022-08-05 ENCOUNTER — OFFICE VISIT (OUTPATIENT)
Dept: PRIMARY CARE CLINIC | Facility: CLINIC | Age: 32
End: 2022-08-05
Payer: MEDICAID

## 2022-08-05 ENCOUNTER — TELEPHONE (OUTPATIENT)
Dept: PRIMARY CARE CLINIC | Facility: CLINIC | Age: 32
End: 2022-08-05
Payer: MEDICAID

## 2022-08-05 DIAGNOSIS — G80.8 OTHER CEREBRAL PALSY: Primary | ICD-10-CM

## 2022-08-05 DIAGNOSIS — M54.32 SCIATICA OF LEFT SIDE: ICD-10-CM

## 2022-08-05 DIAGNOSIS — Z74.09 IMPAIRED MOBILITY: ICD-10-CM

## 2022-08-05 PROCEDURE — 1160F RVW MEDS BY RX/DR IN RCRD: CPT | Mod: CPTII,95,, | Performed by: NURSE PRACTITIONER

## 2022-08-05 PROCEDURE — 4010F ACE/ARB THERAPY RXD/TAKEN: CPT | Mod: CPTII,95,, | Performed by: NURSE PRACTITIONER

## 2022-08-05 PROCEDURE — 4010F PR ACE/ARB THEARPY RXD/TAKEN: ICD-10-PCS | Mod: CPTII,95,, | Performed by: NURSE PRACTITIONER

## 2022-08-05 PROCEDURE — 1159F MED LIST DOCD IN RCRD: CPT | Mod: CPTII,95,, | Performed by: NURSE PRACTITIONER

## 2022-08-05 PROCEDURE — 1160F PR REVIEW ALL MEDS BY PRESCRIBER/CLIN PHARMACIST DOCUMENTED: ICD-10-PCS | Mod: CPTII,95,, | Performed by: NURSE PRACTITIONER

## 2022-08-05 PROCEDURE — 99213 OFFICE O/P EST LOW 20 MIN: CPT | Mod: 95,,, | Performed by: NURSE PRACTITIONER

## 2022-08-05 PROCEDURE — 1159F PR MEDICATION LIST DOCUMENTED IN MEDICAL RECORD: ICD-10-PCS | Mod: CPTII,95,, | Performed by: NURSE PRACTITIONER

## 2022-08-05 PROCEDURE — 99213 PR OFFICE/OUTPT VISIT, EST, LEVL III, 20-29 MIN: ICD-10-PCS | Mod: 95,,, | Performed by: NURSE PRACTITIONER

## 2022-08-05 NOTE — TELEPHONE ENCOUNTER
Returned call to Mrs Perdue regarding patient scheduled appointment. Advised to check phone and try logging on. She stated she was on another phone and switched over to daughters phone. Provider informed.    ----- Message from Abdoul Jordan sent at 8/5/2022  3:13 PM CDT -----  Contact: Deonte Clemons Would like a call back at [phone], in regards to pt virtual visit. She states that  she would like a phone call call from a NP or the doctor if the virtual appointment is not able to be done.

## 2022-08-05 NOTE — PROGRESS NOTES
The patient location is: [place], [place]  The chief complaint leading to consultation is: home health request and hospital bed    Visit type: audiovisual    Face to Face time with patient: 25 minutes  27 minutes of total time spent on the encounter, which includes face to face time and non-face to face time preparing to see the patient (eg, review of tests), Obtaining and/or reviewing separately obtained history, Documenting clinical information in the electronic or other health record, Independently interpreting results (not separately reported) and communicating results to the patient/family/caregiver, or Care coordination (not separately reported).         Each patient to whom he or she provides medical services by telemedicine is:  (1) informed of the relationship between the physician and patient and the respective role of any other health care provider with respect to management of the patient; and (2) notified that he or she may decline to receive medical services by telemedicine and may withdraw from such care at any time.    Notes:     Subjective:      Patient ID: Francis Bains is a 32 y.o. female.    Chief Complaint: No chief complaint on file.    There were no vitals taken for this visit.    33 y/o female presenting virtually with her mother c/o left sided sciatica pain. State theodore runs down her left leg to her foot. Also requesting home health orders to be sent to the insurance that was effective as of 7/1/2022. States her former insurance did not cover home health services. Also requesting a hospital bed to help with positioning and preventing bed sores since she is unable to move her legs and reposition herself.      Review of patient's allergies indicates:   Allergen Reactions    Ace inhibitors Other (See Comments)    Nabumetone Other (See Comments)    Nsaids (non-steroidal anti-inflammatory drug) Other (See Comments)    Sulfa (sulfonamide antibiotics) Hives    Pcn  [penicillins] Rash         Review of Systems   HENT: Negative for hearing loss.    Eyes: Negative for discharge.   Respiratory: Negative for wheezing.    Cardiovascular: Negative for chest pain and palpitations.   Gastrointestinal: Negative for blood in stool, constipation, diarrhea and vomiting.   Genitourinary: Negative for dysuria and hematuria.   Musculoskeletal: Positive for neck pain.   Neurological: Positive for weakness and headaches.   Endo/Heme/Allergies: Negative for polydipsia.      Objective:      Physical Exam  Constitutional:       General: She is awake.   Neurological:      Mental Status: She is alert and oriented to person, place, and time.   Psychiatric:         Mood and Affect: Mood normal.         Behavior: Behavior normal.         Assessment:       1. Other cerebral palsy    2. Sciatica of left side    3. Impaired mobility        Plan:     Other cerebral palsy    Sciatica of left side    Impaired mobility       Advised pt and mom that a new order will be placed for home health.    Answers for HPI/ROS submitted by the patient on 8/5/2022  activity change: Yes  unexpected weight change: Yes  rhinorrhea: No  trouble swallowing: No  visual disturbance: No  chest tightness: No  polyuria: No  difficulty urinating: No  menstrual problem: No  joint swelling: No  arthralgias: Yes  confusion: No  dysphoric mood: Yes

## 2022-08-09 PROCEDURE — G0180 MD CERTIFICATION HHA PATIENT: HCPCS | Mod: ,,, | Performed by: FAMILY MEDICINE

## 2022-08-09 PROCEDURE — G0180 PR HOME HEALTH MD CERTIFICATION: ICD-10-PCS | Mod: ,,, | Performed by: FAMILY MEDICINE

## 2022-08-11 ENCOUNTER — TELEPHONE (OUTPATIENT)
Dept: ADMINISTRATIVE | Facility: CLINIC | Age: 32
End: 2022-08-11
Payer: MEDICAID

## 2022-08-11 ENCOUNTER — TELEPHONE (OUTPATIENT)
Dept: PRIMARY CARE CLINIC | Facility: CLINIC | Age: 32
End: 2022-08-11
Payer: MEDICAID

## 2022-08-11 DIAGNOSIS — R53.81 DEBILITY: Primary | ICD-10-CM

## 2022-08-11 DIAGNOSIS — G80.9 CEREBRAL PALSY, UNSPECIFIED TYPE: ICD-10-CM

## 2022-08-11 NOTE — TELEPHONE ENCOUNTER
Returned call, informed her message was given to Dr Garnica, advised her as soon as Dr Garnica responds I will give her a return call. She voiced understanding.       ----- Message from Hallie Burnett sent at 8/11/2022  2:38 PM CDT -----  Contact: Nette (Mom)  Nette is calling in regards to thanking you all of sending Home Health and she would like to know if you have received Home Health request, she states they have sent several request a few days ago and if so what was the outcome and what is going on with the thing the patient needs. If you have received the request could you put in the orders. Please call [phone]. Thank you s/g

## 2022-08-15 ENCOUNTER — TELEPHONE (OUTPATIENT)
Dept: PRIMARY CARE CLINIC | Facility: CLINIC | Age: 32
End: 2022-08-15
Payer: MEDICAID

## 2022-08-15 NOTE — TELEPHONE ENCOUNTER
Returned call to patient to discuss scheduled appointment, left message awaiting return call.     ----- Message from Bri Garnica MD sent at 8/12/2022  6:22 PM CDT -----  Regarding: RE: Francis Domingo MRN [id number]  Please encourage telemedicine visit or in clinic visit for medication refill. Thanks     ----- Message -----  From: Arina Keene LPN  Sent: 8/10/2022  12:05 PM CDT  To: Bri Garnica MD  Subject: Francis Domingo MRN [id number]                        I spoke to Beverly and she stated thise are a list of things needed for Francis    Referral to neurologist severe decline.    Hospital bed  Air loss mattress alternating  Vonda Lift  Tilt in space power wheel chair  Referral for   Pt is also requesting Gabapentin    Beverly states patient has had a big decline.    ----- Message -----  From: Nicole Santacruz  Sent: 8/10/2022   7:37 AM CDT  To: Nahun Gregory Staff    Please call [facility] @ [phone] regarding pt plan of care.

## 2022-08-16 ENCOUNTER — PES CALL (OUTPATIENT)
Dept: ADMINISTRATIVE | Facility: CLINIC | Age: 32
End: 2022-08-16
Payer: MEDICAID

## 2022-08-17 ENCOUNTER — TELEPHONE (OUTPATIENT)
Dept: NEUROLOGY | Facility: CLINIC | Age: 32
End: 2022-08-17
Payer: MEDICAID

## 2022-08-17 ENCOUNTER — PATIENT MESSAGE (OUTPATIENT)
Dept: PRIMARY CARE CLINIC | Facility: CLINIC | Age: 32
End: 2022-08-17

## 2022-08-17 ENCOUNTER — OFFICE VISIT (OUTPATIENT)
Dept: PRIMARY CARE CLINIC | Facility: CLINIC | Age: 32
End: 2022-08-17
Payer: MEDICAID

## 2022-08-17 ENCOUNTER — CARE AT HOME (OUTPATIENT)
Dept: HOME HEALTH SERVICES | Facility: CLINIC | Age: 32
End: 2022-08-17
Payer: MEDICAID

## 2022-08-17 VITALS
DIASTOLIC BLOOD PRESSURE: 100 MMHG | HEART RATE: 72 BPM | RESPIRATION RATE: 18 BRPM | SYSTOLIC BLOOD PRESSURE: 130 MMHG | TEMPERATURE: 98 F | OXYGEN SATURATION: 99 %

## 2022-08-17 DIAGNOSIS — Z74.09 DECREASED MOBILITY IN BED: ICD-10-CM

## 2022-08-17 DIAGNOSIS — R53.81 DEBILITY: ICD-10-CM

## 2022-08-17 DIAGNOSIS — G80.1: ICD-10-CM

## 2022-08-17 DIAGNOSIS — Z79.899 DVT PROPHYLAXIS: ICD-10-CM

## 2022-08-17 DIAGNOSIS — N18.4 CKD (CHRONIC KIDNEY DISEASE) STAGE 4, GFR 15-29 ML/MIN: ICD-10-CM

## 2022-08-17 DIAGNOSIS — I10 ESSENTIAL HYPERTENSION: ICD-10-CM

## 2022-08-17 DIAGNOSIS — Z74.09 IMPAIRED MOBILITY: ICD-10-CM

## 2022-08-17 DIAGNOSIS — R52 CHRONIC GENERALIZED PAIN DISORDER: ICD-10-CM

## 2022-08-17 DIAGNOSIS — G89.29 CHRONIC GENERALIZED PAIN DISORDER: ICD-10-CM

## 2022-08-17 DIAGNOSIS — M24.532 CONTRACTURE OF BOTH WRIST JOINTS: ICD-10-CM

## 2022-08-17 DIAGNOSIS — M24.531 CONTRACTURE OF BOTH WRIST JOINTS: ICD-10-CM

## 2022-08-17 DIAGNOSIS — M21.372 BILATERAL FOOT-DROP: ICD-10-CM

## 2022-08-17 DIAGNOSIS — R53.81 DEBILITY: Primary | ICD-10-CM

## 2022-08-17 DIAGNOSIS — Z74.09 DECREASED MOBILITY IN BED: Primary | ICD-10-CM

## 2022-08-17 DIAGNOSIS — M21.371 BILATERAL FOOT-DROP: ICD-10-CM

## 2022-08-17 DIAGNOSIS — Z74.09: ICD-10-CM

## 2022-08-17 DIAGNOSIS — U09.9 CHRONIC POST-COVID-19 SYNDROME: Primary | ICD-10-CM

## 2022-08-17 PROBLEM — U07.1 COVID-19: Status: ACTIVE | Noted: 2021-08-17

## 2022-08-17 PROCEDURE — 4010F ACE/ARB THERAPY RXD/TAKEN: CPT | Mod: CPTII,95,, | Performed by: FAMILY MEDICINE

## 2022-08-17 PROCEDURE — 99214 PR OFFICE/OUTPT VISIT, EST, LEVL IV, 30-39 MIN: ICD-10-PCS | Mod: 95,,, | Performed by: FAMILY MEDICINE

## 2022-08-17 PROCEDURE — 99350 PR HOME VISIT,ESTAB PATIENT,LEVEL IV: ICD-10-PCS | Mod: ,,, | Performed by: NURSE PRACTITIONER

## 2022-08-17 PROCEDURE — 99214 OFFICE O/P EST MOD 30 MIN: CPT | Mod: 95,,, | Performed by: FAMILY MEDICINE

## 2022-08-17 PROCEDURE — 99350 HOME/RES VST EST HIGH MDM 60: CPT | Mod: ,,, | Performed by: NURSE PRACTITIONER

## 2022-08-17 PROCEDURE — 4010F PR ACE/ARB THEARPY RXD/TAKEN: ICD-10-PCS | Mod: CPTII,95,, | Performed by: FAMILY MEDICINE

## 2022-08-17 RX ORDER — PREGABALIN 50 MG/1
50 CAPSULE ORAL 3 TIMES DAILY
Qty: 90 CAPSULE | Refills: 2 | Status: SHIPPED | OUTPATIENT
Start: 2022-08-17 | End: 2022-12-18

## 2022-08-17 RX ORDER — BACLOFEN 5 MG/1
5 TABLET ORAL 3 TIMES DAILY
Qty: 90 TABLET | Refills: 2 | Status: SHIPPED | OUTPATIENT
Start: 2022-08-17 | End: 2022-11-09

## 2022-08-17 NOTE — PROGRESS NOTES
Subjective:    The patient location is: [place] at home  Visit type: Virtual visit with synchronous audio and video  Total time spent with patient:45 mins  Each patient to whom he or she provides medical services by telemedicine is:  (1) informed of the relationship between the physician and patient and the respective role of any other health care provider with respect to management of the patient; and (2) notified that he or she may decline to receive medical services by telemedicine and may withdraw from such care at any time.     Mother and NP was resent for the visit.  Mother contributed to the history while NP did the assessment.   Patient ID: Francis Bains is a 32 y.o. female.    Chief Complaint: Mobility evaluation .    HPI   History of Present Illness:   Francis Bains 32 y.o. female presents today with   Francis is hard of hearing but bale to hear and talk to me today without any .  Visit for mobility evaluation  She has CP and was doing well despite multiple psych diagnosis until she caught COVID 08/21. Since then she has not been able to get back to baseline and her physical debility has gradually worsened to the point of being bed ridden. Mother reports that she is not able to get out of bed and resisting PT/Ot because of generalized pain of immobility, contractures to the wrist and fingers are worsening and chronic bilateral foot drop. They have a long list of things to help with mobility and ADL in the house. And she needs pneumatic compression for DVT prophylaxis. She has CKD 4 which contradicts high dose of Lyrica and baclofen. Currently on clonazepam BID and I cannot add gabapentin as requested. Will dose Lyrica and muscle relaxer. Pt is unable to perform bathing, toileting, grooming or eating without manual wheelchair.  Patient/caregiver is willing and able to perform bathing, toileting, grooming or eating with a manual wheelchair.  Patient is not able to use cane or walker due  to excessive chronic severe edema to BLE and debilitating bilateral knee pain.  Upper extremity strength is Good, 4/5  Lower extremity strength is Poor 2/5  BP is slightly elevated today but with the muscle relaxer been added today, NP will revisit in one week for BP monitor.  She has PT/OT at home.  Examined by NP, no pressure ulcers at this time.  CODE Status: Full code.        Past Medical History:   Diagnosis Date    Cerebral palsy     Hearing impaired person     Hypertension     Osteopenia determined by x-ray     Oswaldo syndrome      Family History   Problem Relation Age of Onset    Breast cancer Neg Hx     Colon cancer Neg Hx     Ovarian cancer Neg Hx      Social History     Socioeconomic History    Marital status: Single   Tobacco Use    Smoking status: Former Smoker     Packs/day: 0.25     Years: 9.00     Pack years: 2.25    Smokeless tobacco: Never Used   Substance and Sexual Activity    Alcohol use: Yes     Comment: Social    Drug use: Yes     Types: Marijuana    Sexual activity: Yes     Partners: Male     Birth control/protection: Inserts     Comment: Single.     Outpatient Encounter Medications as of 8/17/2022   Medication Sig Dispense Refill    alendronate (FOSAMAX) 70 MG tablet Take 1 tablet (70 mg total) by mouth every 7 days. 4 tablet 11    amlodipine (NORVASC) 10 MG tablet Take 10 mg by mouth once daily.  0    baclofen (LIORESAL) 5 mg Tab tablet Take 1 tablet (5 mg total) by mouth 3 (three) times daily. 90 tablet 2    carvedilol (COREG) 6.25 MG tablet 12.5 mg.   2    ergocalciferol (ERGOCALCIFEROL) 50,000 unit Cap Vitamin D2 50,000 unit capsule      estrogen, conjugated,-medroxyprogesterone 0.625-2.5mg (PREMPRO) 0.625-2.5 mg per tablet Take 1 tablet by mouth once daily. 28 tablet 9    famotidine (PEPCID) 20 MG tablet TAKE 1 TABLET PO TWICE A DAY  4    ondansetron (ZOFRAN-ODT) 4 MG TbDL Take 1 tablet (4 mg total) by mouth every 8 (eight) hours as needed (nausea). 20 tablet 0     pantoprazole (PROTONIX) 40 MG tablet TK 1 T PO  D      pregabalin (LYRICA) 50 MG capsule Take 1 capsule (50 mg total) by mouth 3 (three) times daily. 90 capsule 2    sertraline (ZOLOFT) 100 MG tablet Take 0.5 tablets (50 mg total) by mouth every evening. 30 tablet 0    sodium bicarbonate 650 MG tablet Take 0.5 tablets (325 mg total) by mouth once daily. 15 tablet 0    traZODone (DESYREL) 50 MG tablet       [DISCONTINUED] clonazePAM (KLONOPIN) 1 MG tablet Take 1 tablet (1 mg total) by mouth daily as needed for Anxiety. 30 tablet 1    [DISCONTINUED] losartan (COZAAR) 50 MG tablet losartan 50 mg tablet       No facility-administered encounter medications on file as of 8/17/2022.       Review of Systems  Review of Systems      A complete 10 point ROS was completed and are positive as per above HPI.    Otherwise negative for fever, diplopia, chest pain, shortness of breath, vomiting, blood in urine, joint pain, skin rash, seizures and unusual bleeding.     Objective:      There were no vitals taken for this visit.  Physical Exam    CONSTITUTIONAL: No apparent distress. In bed, contracture to the wrist.   CARDIOVASCULAR: No perioral cyanosis  PULMONARY: Breathing unlabored. No retractions Chest expansion grossly normal.  PSYCHIATRIC: Alert and conversant and grossly oriented.    Results for orders placed or performed during the hospital encounter of 06/06/22   CBC auto differential   Result Value Ref Range    WBC 11.32 3.90 - 12.70 K/uL    RBC 3.60 (L) 4.00 - 5.40 M/uL    Hemoglobin 11.4 (L) 12.0 - 16.0 g/dL    Hematocrit 34.1 (L) 37.0 - 48.5 %    MCV 95 82 - 98 fL    MCH 31.7 (H) 27.0 - 31.0 pg    MCHC 33.4 32.0 - 36.0 g/dL    RDW 13.4 11.5 - 14.5 %    Platelets 214 150 - 450 K/uL    MPV 9.7 9.2 - 12.9 fL    Immature Granulocytes 0.9 (H) 0.0 - 0.5 %    Gran # (ANC) 9.6 (H) 1.8 - 7.7 K/uL    Immature Grans (Abs) 0.10 (H) 0.00 - 0.04 K/uL    Lymph # 1.0 1.0 - 4.8 K/uL    Mono # 0.6 0.3 - 1.0 K/uL    Eos # 0.0 0.0  - 0.5 K/uL    Baso # 0.02 0.00 - 0.20 K/uL    nRBC 0 0 /100 WBC    Gran % 84.5 (H) 38.0 - 73.0 %    Lymph % 8.8 (L) 18.0 - 48.0 %    Mono % 5.4 4.0 - 15.0 %    Eosinophil % 0.2 0.0 - 8.0 %    Basophil % 0.2 0.0 - 1.9 %    Differential Method Automated    Comprehensive metabolic panel   Result Value Ref Range    Sodium 132 (L) 136 - 145 mmol/L    Potassium 4.3 3.5 - 5.1 mmol/L    Chloride 93 (L) 95 - 110 mmol/L    CO2 15 (L) 23 - 29 mmol/L    Glucose 100 70 - 110 mg/dL    BUN 88 (H) 6 - 20 mg/dL    Creatinine 4.8 (H) 0.5 - 1.4 mg/dL    Calcium 10.0 8.7 - 10.5 mg/dL    Total Protein 8.3 6.0 - 8.4 g/dL    Albumin 3.6 3.5 - 5.2 g/dL    Total Bilirubin 0.4 0.1 - 1.0 mg/dL    Alkaline Phosphatase 79 55 - 135 U/L    AST 59 (H) 10 - 40 U/L    ALT 31 10 - 44 U/L    Anion Gap 24 (H) 8 - 16 mmol/L    eGFR if African American 13 (A) >60 mL/min/1.73 m^2    eGFR if non African American 11 (A) >60 mL/min/1.73 m^2   Troponin I #1   Result Value Ref Range    Troponin I 0.022 0.000 - 0.026 ng/mL   BNP   Result Value Ref Range    BNP <10 0 - 99 pg/mL   Urinalysis, Reflex to Urine Culture Urine, Clean Catch    Specimen: Urine   Result Value Ref Range    Specimen UA Urine, Catheterized     Color, UA Yellow Yellow, Straw, Gloria    Appearance, UA Clear Clear    pH, UA 6.0 5.0 - 8.0    Specific Gravity, UA 1.010 1.005 - 1.030    Protein, UA Negative Negative    Glucose, UA Negative Negative    Ketones, UA Trace (A) Negative    Bilirubin (UA) 1+ (A) Negative    Occult Blood UA 1+ (A) Negative    Nitrite, UA Negative Negative    Urobilinogen, UA Negative <2.0 EU/dL    Leukocytes, UA Negative Negative   Drug screen panel, emergency   Result Value Ref Range    Benzodiazepines Presumptive Positive (A) Negative    Methadone metabolites Negative Negative    Cocaine (Metab.) Negative Negative    Opiate Scrn, Ur Negative Negative    Barbiturate Screen, Ur Negative Negative    Amphetamine Screen, Ur Negative Negative    THC Presumptive Positive (A)  Negative    Phencyclidine Negative Negative    Creatinine, Urine 88.8 15.0 - 325.0 mg/dL    Toxicology Information SEE COMMENT    Occult blood x 1, stool    Specimen: Stool   Result Value Ref Range    Occult Blood Positive (A) Negative   Urinalysis Microscopic   Result Value Ref Range    RBC, UA 2 0 - 4 /hpf    Microscopic Comment SEE COMMENT    TSH   Result Value Ref Range    TSH 4.984 (H) 0.400 - 4.000 uIU/mL   T4, Free   Result Value Ref Range    Free T4 1.08 0.71 - 1.51 ng/dL   Basic Metabolic Panel (BMP)   Result Value Ref Range    Sodium 136 136 - 145 mmol/L    Potassium 3.8 3.5 - 5.1 mmol/L    Chloride 102 95 - 110 mmol/L    CO2 15 (L) 23 - 29 mmol/L    Glucose 121 (H) 70 - 110 mg/dL    BUN 83 (H) 6 - 20 mg/dL    Creatinine 3.9 (H) 0.5 - 1.4 mg/dL    Calcium 8.7 8.7 - 10.5 mg/dL    Anion Gap 19 (H) 8 - 16 mmol/L    eGFR if African American 17 (A) >60 mL/min/1.73 m^2    eGFR if non African American 14 (A) >60 mL/min/1.73 m^2   CBC with Automated Differential   Result Value Ref Range    WBC 8.31 3.90 - 12.70 K/uL    RBC 3.05 (L) 4.00 - 5.40 M/uL    Hemoglobin 9.7 (L) 12.0 - 16.0 g/dL    Hematocrit 30.1 (L) 37.0 - 48.5 %    MCV 99 (H) 82 - 98 fL    MCH 31.8 (H) 27.0 - 31.0 pg    MCHC 32.2 32.0 - 36.0 g/dL    RDW 13.3 11.5 - 14.5 %    Platelets 174 150 - 450 K/uL    MPV 9.4 9.2 - 12.9 fL    Immature Granulocytes 0.6 (H) 0.0 - 0.5 %    Gran # (ANC) 6.5 1.8 - 7.7 K/uL    Immature Grans (Abs) 0.05 (H) 0.00 - 0.04 K/uL    Lymph # 1.1 1.0 - 4.8 K/uL    Mono # 0.6 0.3 - 1.0 K/uL    Eos # 0.0 0.0 - 0.5 K/uL    Baso # 0.03 0.00 - 0.20 K/uL    nRBC 0 0 /100 WBC    Gran % 78.7 (H) 38.0 - 73.0 %    Lymph % 12.6 (L) 18.0 - 48.0 %    Mono % 7.2 4.0 - 15.0 %    Eosinophil % 0.5 0.0 - 8.0 %    Basophil % 0.4 0.0 - 1.9 %    Differential Method Automated    Basic Metabolic Panel (BMP)   Result Value Ref Range    Sodium 138 136 - 145 mmol/L    Potassium 3.8 3.5 - 5.1 mmol/L    Chloride 106 95 - 110 mmol/L    CO2 18 (L) 23 - 29  mmol/L    Glucose 133 (H) 70 - 110 mg/dL    BUN 60 (H) 6 - 20 mg/dL    Creatinine 2.7 (H) 0.5 - 1.4 mg/dL    Calcium 9.1 8.7 - 10.5 mg/dL    Anion Gap 14 8 - 16 mmol/L    eGFR if African American 26 (A) >60 mL/min/1.73 m^2    eGFR if non African American 22 (A) >60 mL/min/1.73 m^2   CBC with Automated Differential   Result Value Ref Range    WBC 8.42 3.90 - 12.70 K/uL    RBC 3.17 (L) 4.00 - 5.40 M/uL    Hemoglobin 10.1 (L) 12.0 - 16.0 g/dL    Hematocrit 31.9 (L) 37.0 - 48.5 %     (H) 82 - 98 fL    MCH 31.9 (H) 27.0 - 31.0 pg    MCHC 31.7 (L) 32.0 - 36.0 g/dL    RDW 13.5 11.5 - 14.5 %    Platelets 164 150 - 450 K/uL    MPV 9.6 9.2 - 12.9 fL    Immature Granulocytes 0.4 0.0 - 0.5 %    Gran # (ANC) 6.8 1.8 - 7.7 K/uL    Immature Grans (Abs) 0.03 0.00 - 0.04 K/uL    Lymph # 0.8 (L) 1.0 - 4.8 K/uL    Mono # 0.7 0.3 - 1.0 K/uL    Eos # 0.1 0.0 - 0.5 K/uL    Baso # 0.02 0.00 - 0.20 K/uL    nRBC 0 0 /100 WBC    Gran % 81.1 (H) 38.0 - 73.0 %    Lymph % 9.5 (L) 18.0 - 48.0 %    Mono % 8.1 4.0 - 15.0 %    Eosinophil % 0.7 0.0 - 8.0 %    Basophil % 0.2 0.0 - 1.9 %    Differential Method Automated    C-reactive protein   Result Value Ref Range    CRP 14.5 (H) 0.0 - 8.2 mg/L   Sedimentation rate   Result Value Ref Range    Sed Rate 68 (H) 0 - 20 mm/Hr   Urinalysis   Result Value Ref Range    Specimen UA Urine, Clean Catch     Color, UA Yellow Yellow, Straw, Gloria    Appearance, UA Clear Clear    pH, UA 6.0 5.0 - 8.0    Specific Gravity, UA 1.010 1.005 - 1.030    Protein, UA Negative Negative    Glucose, UA Negative Negative    Ketones, UA Negative Negative    Bilirubin (UA) Negative Negative    Occult Blood UA 2+ (A) Negative    Nitrite, UA Negative Negative    Urobilinogen, UA Negative <2.0 EU/dL    Leukocytes, UA 1+ (A) Negative   Urinalysis Microscopic   Result Value Ref Range    RBC, UA 25 (H) 0 - 4 /hpf    WBC, UA 3 0 - 5 /hpf    Bacteria Rare None-Occ /hpf    Squam Epithel, UA 2 /hpf    Microscopic Comment SEE  COMMENT    Basic Metabolic Panel   Result Value Ref Range    Sodium 134 (L) 136 - 145 mmol/L    Potassium 4.6 3.5 - 5.1 mmol/L    Chloride 103 95 - 110 mmol/L    CO2 19 (L) 23 - 29 mmol/L    Glucose 77 70 - 110 mg/dL    BUN 68 (H) 6 - 20 mg/dL    Creatinine 3.6 (H) 0.5 - 1.4 mg/dL    Calcium 8.9 8.7 - 10.5 mg/dL    Anion Gap 12 8 - 16 mmol/L    eGFR if African American 18 (A) >60 mL/min/1.73 m^2    eGFR if non African American 16 (A) >60 mL/min/1.73 m^2   Basic Metabolic Panel   Result Value Ref Range    Sodium 137 136 - 145 mmol/L    Potassium 4.1 3.5 - 5.1 mmol/L    Chloride 106 95 - 110 mmol/L    CO2 17 (L) 23 - 29 mmol/L    Glucose 72 70 - 110 mg/dL    BUN 68 (H) 6 - 20 mg/dL    Creatinine 3.7 (H) 0.5 - 1.4 mg/dL    Calcium 8.9 8.7 - 10.5 mg/dL    Anion Gap 14 8 - 16 mmol/L    eGFR if African American 18 (A) >60 mL/min/1.73 m^2    eGFR if non African American 15 (A) >60 mL/min/1.73 m^2   POCT COVID-19 Rapid Screening   Result Value Ref Range    POC Rapid COVID Negative Negative     Acceptable Yes    Type & Screen   Result Value Ref Range    Group & Rh O POS     Indirect Miguel Ángel NEG      Assessment:       1. Chronic post-COVID-19 syndrome    2. Essential hypertension    3. Debility    4. Spastic cerebral palsy, congenital    5. Chronic generalized pain disorder    6. Decreased mobility in bed    7. Impaired mobility    8. DVT prophylaxis    9. Dependent for transfer from bed to chair    10. Bilateral foot-drop    11. Contracture of both wrist joints    12. CKD (chronic kidney disease) stage 4, GFR 15-29 ml/min        Plan:   Chronic post-COVID-19 syndrome  -     Ambulatory referral/consult to Neurology; Future; Expected date: 08/24/2022  -     Full code    Essential hypertension  -     Hypertension Digital Medicine (HDMP) Enrollment Order  -     Hypertension Digital Medicine (HDMP): Assign Onboarding Questionnaires    Debility    Spastic cerebral palsy, congenital  -     WHEELCHAIR FOR HOME USE  -      Ambulatory referral/consult to Neurology; Future; Expected date: 08/24/2022  -     Full code  -     pregabalin (LYRICA) 50 MG capsule; Take 1 capsule (50 mg total) by mouth 3 (three) times daily.  Dispense: 90 capsule; Refill: 2  -     baclofen (LIORESAL) 5 mg Tab tablet; Take 1 tablet (5 mg total) by mouth 3 (three) times daily.  Dispense: 90 tablet; Refill: 2    Chronic generalized pain disorder  -     pregabalin (LYRICA) 50 MG capsule; Take 1 capsule (50 mg total) by mouth 3 (three) times daily.  Dispense: 90 capsule; Refill: 2    Decreased mobility in bed  -     COMPRESSION STOCKINGS  -     PNEUMATIC PUMP FOR HOME USE  -     HOSPITAL BED FOR HOME USE  -     PNEUMATIC STOCKING FOR HOME USE    Impaired mobility  -     WHEELCHAIR FOR HOME USE    DVT prophylaxis  -     COMPRESSION STOCKINGS  -     PNEUMATIC PUMP FOR HOME USE  -     PNEUMATIC STOCKING FOR HOME USE    Dependent for transfer from bed to chair  -     PATIENT (FISH) LIFT FOR HOME USE    Bilateral foot-drop  -     WHEELCHAIR FOR HOME USE  -     ANKLE FOOT ORTHOSIS FOR HOME USE    Contracture of both wrist joints  -     HME - OTHER    CKD (chronic kidney disease) stage 4, GFR 15-29 ml/min  -     Basic Metabolic Panel; Future; Expected date: 08/31/2022    Will monitor renal function closely with repeat bmp 2 weeks after she starts med.    Bri Garnica MD

## 2022-08-17 NOTE — TELEPHONE ENCOUNTER
----- Message from Linden Gallego sent at 8/17/2022  2:23 PM CDT -----  Regarding: Scheduling  Good afternoon,     An order was placed for the patient can you assist with scheduling please? Thanks. It looks like she was suppose to be scheduled for a hospital f/u.       Spastic cerebral palsy, congenital [G80.1]  Chronic post-COVID-19 syndrome [U09.9]

## 2022-08-18 ENCOUNTER — TELEPHONE (OUTPATIENT)
Dept: PRIMARY CARE CLINIC | Facility: CLINIC | Age: 32
End: 2022-08-18
Payer: MEDICAID

## 2022-08-18 NOTE — TELEPHONE ENCOUNTER
Returned call to Mrs Perdue informed her orders were sent to DME and we are awaiting response, she voiced understanding.

## 2022-08-18 NOTE — PROGRESS NOTES
[facility]  Medical Home Visit    Visit Date: 8/19/2022  Encounter Provider: Maria Del Carmen England  PCP:  Bri Garnica MD    Subjective:      Patient ID: Francis Bains is a 32 y.o. female.    Consult Requested By:  No ref. provider found  Reason for Consult:  Post covid follow up    Francis is being seen at home due to being seen at home due to physical debility that presents a taxing effort to leave the home, to mitigate high risk of hospital readmission and/or due to the limited availability of reliable or safe options for transportation to the point of access to the provider. Prior to treatment on this visit the chart was reviewed and patient verbal consent was obtained.    Chief Complaint: Follow-up      Patient is being seen today as a follow up post hospitalization for covid infection. Upon arrival patient was lying in bed AAOx3. Patient's mother was attempting to connect with Dr Garnica for a virtual visit but was having some issues. Attempt was finally successful. PAtiet has been having some issues with pain and is now bed bound after having covid. Before covid patient was living independently. Patient now has foot drop to both feet and her hands are sebastian inward at the wrists. Mother takes care of the patient and patient is in need of a hospital bed, a rosa maria lift, and a high back reclining wheelchair to assist with mobility. VSS and patient reports compliance with all medications. Items patient needed and referrral to neurology ordered  per Dr Garnica.          Review of Systems   Constitutional: Positive for activity change (patient is bedbound post covid).   HENT: Negative.    Eyes: Negative.    Respiratory: Negative.    Cardiovascular: Negative.    Gastrointestinal: Positive for diarrhea.   Endocrine: Negative.    Genitourinary:        Patient is incontinent of bladder   Musculoskeletal: Positive for arthralgias and gait problem.        Patient has contractures of the hands and foot drop to both  feet   Skin: Negative.    Allergic/Immunologic: Negative.    Neurological: Positive for weakness.   Hematological: Negative.    Psychiatric/Behavioral: Positive for agitation and dysphoric mood.       Assessments:  · Environmental: Patient lives in a single story home with her mother. There are no steps at the entrance and lighting is dim and temperature is comfortable.   · Functional Status: Bed bound and patient is dependent on others for her ADLs  · Safety: Fall Precautions  · Nutritional: Adequate food in the home  · Home Health/DME/Supplies: [facility]    Objective:   Physical Exam  Vitals reviewed.   Constitutional:       General: She is not in acute distress.  HENT:      Head: Normocephalic.      Nose: Nose normal.      Mouth/Throat:      Mouth: Mucous membranes are moist.      Pharynx: Oropharynx is clear.   Eyes:      Pupils: Pupils are equal, round, and reactive to light.   Cardiovascular:      Rate and Rhythm: Normal rate and regular rhythm.      Pulses: Normal pulses.      Heart sounds: Normal heart sounds.   Pulmonary:      Breath sounds: Normal breath sounds.   Abdominal:      General: Bowel sounds are normal. There is no distension.      Palpations: Abdomen is soft.      Tenderness: There is no abdominal tenderness.   Musculoskeletal:      Cervical back: Normal range of motion.      Right foot: Foot drop present.      Left foot: Foot drop present.      Comments: Patient has contractures at the wrist   Skin:     General: Skin is warm and dry.      Capillary Refill: Capillary refill takes less than 2 seconds.   Neurological:      General: No focal deficit present.      Mental Status: She is alert and oriented to person, place, and time.      Motor: Weakness present.   Psychiatric:         Mood and Affect: Mood is depressed. Affect is tearful.         Speech: Speech normal.         Behavior: Behavior is cooperative.         Thought Content: Thought content normal.         Cognition and Memory:  Cognition normal.         Judgment: Judgment normal.         Vitals:    08/17/22 1130   BP: (!) 130/100   Pulse: 72   Resp: 18   Temp: 98.4 °F (36.9 °C)   SpO2: 99%   PainSc:   8   PainLoc: Generalized     There is no height or weight on file to calculate BMI.    Assessment:     1. Debility        Plan:     Ethical / Legal: Advance Care Planning   · Surrogate decision maker:  Name  Nette Bains, Relationship: Mother   · Code Status: Full Code  · [place]  [place]  · Other advance directive:  None , Capacity to make medical decisions:  Yes, Conflict None       1. Debility    Encounter for Medical Follow-Up and Medication Review  - [facility] to schedule follow-up visit with patient in 1 week or PRN     Patient Instructions Given:  - Continue all medications, treatments and therapies as ordered.   - Follow all instructions, recommendations as discussed.  - Maintain Safety Precautions at all times.  - Attend all medical appointments as scheduled.  - For worsening symptoms: call Primary Care Physician or Nurse Practitioner.  - For emergencies, call 911 or immediately report to the nearest emergency room       Were controlled substances prescribed?  No    Follow Up Appointments:   Future Appointments   Date Time Provider Department Center   8/24/2022  8:00 AM Maria Del Carmen Bhagat NP [address]   8/29/2022  9:40 AM Bri Garnica MD [facility]   1/17/2023  3:00 PM Noe Kolb DO [facility]       Signature: Maria Del Carmen Bhagat NP

## 2022-08-24 ENCOUNTER — TELEPHONE (OUTPATIENT)
Dept: PRIMARY CARE CLINIC | Facility: CLINIC | Age: 32
End: 2022-08-24
Payer: MEDICAID

## 2022-08-24 NOTE — TELEPHONE ENCOUNTER
I called the patient in reference to her request for medical equipment. There was no answer I left a message to call the clinic back.

## 2022-08-24 NOTE — TELEPHONE ENCOUNTER
----- Message from Francine Smith sent at 8/24/2022  3:13 PM CDT -----  Contact: Tracy Jain  with [facility] @  [phone]  Please call her in ref to home health equipment that may or may not be ordered for this patient.

## 2022-08-29 ENCOUNTER — PATIENT OUTREACH (OUTPATIENT)
Dept: BEHAVIORAL HEALTH | Facility: CLINIC | Age: 32
End: 2022-08-29
Payer: MEDICAID

## 2022-08-29 NOTE — PROGRESS NOTES
Casework    Equipment: Wheelchair                       Hospital Bed                       Lift                       Wrist Bracelet                       Foot Brace    Called to check status of orders. All equipment have been placed and should be shipped to patient. Informed patient care taker/ mother equipment will be in by the end of next week.    [facility]: [phone]    Representative: Carie    [employer]: [employer]

## 2022-08-31 ENCOUNTER — EXTERNAL HOME HEALTH (OUTPATIENT)
Dept: HOME HEALTH SERVICES | Facility: HOSPITAL | Age: 32
End: 2022-08-31
Payer: MEDICAID

## 2022-08-31 ENCOUNTER — PATIENT OUTREACH (OUTPATIENT)
Dept: ADMINISTRATIVE | Facility: HOSPITAL | Age: 32
End: 2022-08-31
Payer: MEDICAID

## 2022-08-31 NOTE — PROGRESS NOTES
Called patient to confirm hospital follow up scheduled on 09/01/22. Unable to confirm patient did not answer, LVM.

## 2023-01-12 ENCOUNTER — TELEPHONE (OUTPATIENT)
Dept: NEUROLOGY | Facility: CLINIC | Age: 33
End: 2023-01-12
Payer: MEDICAID

## 2023-01-17 ENCOUNTER — OFFICE VISIT (OUTPATIENT)
Dept: NEUROLOGY | Facility: CLINIC | Age: 33
End: 2023-01-17
Payer: MEDICAID

## 2023-01-17 VITALS — HEIGHT: 63 IN | BODY MASS INDEX: 35.73 KG/M2

## 2023-01-17 DIAGNOSIS — G62.9 NEUROPATHY: ICD-10-CM

## 2023-01-17 DIAGNOSIS — R29.90 ABNORMAL NEUROLOGICAL EXAM: ICD-10-CM

## 2023-01-17 DIAGNOSIS — Z87.898 HISTORY OF PROGRESSIVE WEAKNESS: Primary | ICD-10-CM

## 2023-01-17 DIAGNOSIS — G80.1: ICD-10-CM

## 2023-01-17 DIAGNOSIS — R53.1 WEAKNESS: ICD-10-CM

## 2023-01-17 PROCEDURE — 1159F PR MEDICATION LIST DOCUMENTED IN MEDICAL RECORD: ICD-10-PCS | Mod: CPTII,,, | Performed by: PSYCHIATRY & NEUROLOGY

## 2023-01-17 PROCEDURE — 99215 OFFICE O/P EST HI 40 MIN: CPT | Mod: S$PBB,,, | Performed by: PSYCHIATRY & NEUROLOGY

## 2023-01-17 PROCEDURE — 99215 PR OFFICE/OUTPT VISIT, EST, LEVL V, 40-54 MIN: ICD-10-PCS | Mod: S$PBB,,, | Performed by: PSYCHIATRY & NEUROLOGY

## 2023-01-17 PROCEDURE — 99215 OFFICE O/P EST HI 40 MIN: CPT | Mod: PBBFAC,PO | Performed by: PSYCHIATRY & NEUROLOGY

## 2023-01-17 PROCEDURE — 1159F MED LIST DOCD IN RCRD: CPT | Mod: CPTII,,, | Performed by: PSYCHIATRY & NEUROLOGY

## 2023-01-17 PROCEDURE — 99999 PR PBB SHADOW E&M-EST. PATIENT-LVL V: CPT | Mod: PBBFAC,,, | Performed by: PSYCHIATRY & NEUROLOGY

## 2023-01-17 PROCEDURE — 99999 PR PBB SHADOW E&M-EST. PATIENT-LVL V: ICD-10-PCS | Mod: PBBFAC,,, | Performed by: PSYCHIATRY & NEUROLOGY

## 2023-01-17 PROCEDURE — 3008F PR BODY MASS INDEX (BMI) DOCUMENTED: ICD-10-PCS | Mod: CPTII,,, | Performed by: PSYCHIATRY & NEUROLOGY

## 2023-01-17 PROCEDURE — 3008F BODY MASS INDEX DOCD: CPT | Mod: CPTII,,, | Performed by: PSYCHIATRY & NEUROLOGY

## 2023-01-17 PROCEDURE — 1160F RVW MEDS BY RX/DR IN RCRD: CPT | Mod: CPTII,,, | Performed by: PSYCHIATRY & NEUROLOGY

## 2023-01-17 PROCEDURE — 1160F PR REVIEW ALL MEDS BY PRESCRIBER/CLIN PHARMACIST DOCUMENTED: ICD-10-PCS | Mod: CPTII,,, | Performed by: PSYCHIATRY & NEUROLOGY

## 2023-01-17 RX ORDER — FLUTICASONE PROPIONATE 50 MCG
1 SPRAY, SUSPENSION (ML) NASAL DAILY
COMMUNITY

## 2023-01-17 RX ORDER — GABAPENTIN 100 MG/1
300 CAPSULE ORAL 3 TIMES DAILY
COMMUNITY
End: 2023-11-14 | Stop reason: SDUPTHER

## 2023-01-17 NOTE — PROGRESS NOTES
"[facility] NEUROLOGY  [facility], [place]    Date: 1/17/23  Patient Name: Francis Bains   MRN: [id number]   PCP: Bri Garnica  Referring Provider: Bri Garnica MD    *This visit was conducted with the assistance of professional  physically present during today's encounter.*    Chief Complaint:  History of cerebral palsy, history of progressive idiopathic weakness  Subjective:   Patient seen in consultation at the request of Bri Garnica MD for the evaluation of the above chief complaints. A copy of this note will be sent to the referring physician.      HPI:   Ms. Francis Bains is a 32 y.o. RH female with past medical history as below including cerebral palsy presenting to discuss significant health events over the last 1-2 years.  She is joined at the time of the encounter by her caregiving assistant and parents.  They jointly share that the patient underwent COVID-19 vaccination in August 2021 at which time she was also suffering with COVID-19 which did not come to light until a couple of days later (unknown significance).  In March 2022, the patient began to notice that her feet felt especially heavy, described as heavy rocks in my shoes".  This escalated over a couple of weeks to include significant weakness in the feet followed by weakness in more proximal muscle groups of the leg.  By May 2022, the patient was completely immobile with no control of her legs.  Denies any bowel/bladder incontinence at the time.  Mild sensory changes but no dense numbness present.  Alongside this worsening, the patient started to have problems with nutrition.  She denies swallowing/choking issues but had persistent very frequent vomiting when she consumes solids.  She naturally became averse to eating and drinking which led to multiple hospital visits for nutritional deficit/dehydration.  Progression in weakness continued and started to involve the " bilateral hands and rapidly came to include the entirety of both arms.  In early summer 2022, the patient had lost function of arms and legs.  No respiratory compromise or changes in swallowing.  Her parents highlight that she was tested for everything over multiple hospitalizations at multiple health systems during this time frame.  They mention multiple rounds of neurological imaging (presumably CT, MRI and possible due to type of cochlear implant per family), lumbar puncture (which the father is able to readily note was negative for Guillain-[place], no elevated protein), innumerable blood sample testing, and other inpatient experiences.  Ultimately, they report that there were no significant findings.  Chart review via Care everywhere is quite difficult given the patient had workup at multiple facilities not all of which are included in our electronic medical record sharing.  There is record of at least 1 inpatient neurology encounter where the patient was found to have normal reflexes and some mild spasticity in the lower extremities; father notes this was early in the process.  Results from that encounter included negative testing for Lambert-Eaton and myasthenia gravis.    The patient was also noted to have very mild elevations in CK ranging from 300-500 during the time frame of peek weakness.  By the end of summer 2022, it seemed as if the worst of the symptoms were subsiding as the patient very slowly started to regain use of the upper extremities followed by the legs.  They note that full functionality in the hands has not returned and lower extremities are still very far from previous baseline.    A point of clarity discussed with her parents was that the patient was diagnosed controversial only with cerebral palsy in very early childhood.  They were told at the time that her symptoms of spasticity were limited to the bilateral lower extremities.  Later, pediatrician's disagreed with this diagnosis and  doubted that the patient has suffered with any spasticity at all.  Early health history also included severe hearing deficiencies requiring cochlear implant.     At the time of today's encounter, the patient presents to our clinic on a EMS stretcher due to severe mobility and strength issues.    Continues to deny bowel/bladder incontinence, respiratory weakness, swallowing/choking complaints.  Has significant sensitivity and paresthesias in the bilateral feet.  Endorses more stiffness in the legs than previous baseline.    Before this illness, the patient was able to walk unassisted, had full, normal use of hands, was able to drive unassisted, and lived an independent lifestyle.    To their knowledge, no EMG/NCV testing has been completed as this is their 1st encounter with an outpatient neurologist.    PAST MEDICAL HISTORY:  Past Medical History:   Diagnosis Date    Cerebral palsy     Hearing impaired person     Hypertension     Osteopenia determined by x-ray     Oswaldo syndrome      Patient Active Problem List   Diagnosis    Female infertility of unspecified origin    Infantile cerebral palsy, unspecified    Osteopenia determined by x-ray    Premature ovarian failure    Major depression    HTN (hypertension)    DENTON (generalized anxiety disorder)    Acute renal failure superimposed on stage 4 chronic kidney disease    Dehydration    Hypokalemia    Debility    Black stool    Esophageal dysphagia    N&V (nausea and vomiting)    COVID-19     PAST SURGICAL HISTORY:  Past Surgical History:   Procedure Laterality Date    CHOLECYSTECTOMY      INNER EAR SURGERY      RENAL BIOPSY  07/07/14       CURRENT MEDS:  Current Outpatient Medications   Medication Sig Dispense Refill    acetaminophen (TYLENOL) suppository Place 325 mg rectally every 4 (four) hours as needed for Temperature greater than.      alendronate (FOSAMAX) 70 MG tablet Take 1 tablet (70 mg total) by mouth every 7 days. 4 tablet 11    amlodipine (NORVASC) 10  MG tablet Take 10 mg by mouth once daily.  0    carvedilol (COREG) 6.25 MG tablet 12.5 mg.   2    estrogen, conjugated,-medroxyprogesterone 0.625-2.5mg (PREMPRO) 0.625-2.5 mg per tablet Take 1 tablet by mouth once daily. 28 tablet 9    fluticasone propionate (FLONASE) 50 mcg/actuation nasal spray 1 spray by Each Nostril route once daily.      gabapentin (NEURONTIN) 100 MG capsule Take 100 mg by mouth 3 (three) times daily.      pantoprazole (PROTONIX) 40 MG tablet TK 1 T PO  D      pregabalin (LYRICA) 50 MG capsule Take 1 capsule (50 mg total) by mouth 3 (three) times daily. 90 capsule 0    baclofen (LIORESAL) 10 MG tablet Take 0.5 tablets (5 mg total) by mouth 3 (three) times daily. (Patient not taking: Reported on 1/17/2023) 45 tablet 2    ergocalciferol (ERGOCALCIFEROL) 50,000 unit Cap Vitamin D2 50,000 unit capsule      famotidine (PEPCID) 20 MG tablet TAKE 1 TABLET PO TWICE A DAY  4    ondansetron (ZOFRAN-ODT) 4 MG TbDL Take 1 tablet (4 mg total) by mouth every 8 (eight) hours as needed (nausea). (Patient not taking: Reported on 1/17/2023) 20 tablet 0    sertraline (ZOLOFT) 100 MG tablet Take 0.5 tablets (50 mg total) by mouth every evening. (Patient not taking: Reported on 1/17/2023) 30 tablet 0    sodium bicarbonate 650 MG tablet Take 0.5 tablets (325 mg total) by mouth once daily. (Patient not taking: Reported on 1/17/2023) 15 tablet 0    traZODone (DESYREL) 50 MG tablet Patient needs refill       No current facility-administered medications for this visit.       ALLERGIES:  Review of patient's allergies indicates:   Allergen Reactions    Ace inhibitors Other (See Comments)    Nabumetone Other (See Comments)    Nsaids (non-steroidal anti-inflammatory drug) Other (See Comments)    Sulfa (sulfonamide antibiotics) Hives    Pcn  [penicillins] Rash       FAMILY HISTORY:  Family History   Problem Relation Age of Onset    Breast cancer Neg Hx     Colon cancer Neg Hx     Ovarian cancer Neg Hx        SOCIAL  "HISTORY:  Social History     Tobacco Use    Smoking status: Former     Packs/day: 0.25     Years: 9.00     Pack years: 2.25     Types: Cigarettes    Smokeless tobacco: Never   Substance Use Topics    Alcohol use: Yes     Comment: Social    Drug use: Yes     Types: Marijuana       Review of Systems:  Gen: no fever, no chills, no generalized feeling of weakness   HEENT: no double vision, no blurred vision, no eye pain, no eye exudates. no nasal congestion,   no traumatic injury of head, no neck pain, no neck stiffness. no photophobia or phonophobia at this time. ?    Heart: no chest pain, no SOB    Lungs: no SOB, no cough    MSK: no weakness of legs, intact ROM    ABD: no abd pain, no N/V/D/C, no difficulty with defecation.    Extremities: No leg pain, no edema.       Objective:     Vitals:    01/17/23 1445   Height: 5' 3" (1.6 m)     General: female in NAD, alert and awake, Aox3, well groomed. ?    ? ?    HEENT: Head is NC/AT outside of presence of cochlear implant pupil size: 4 mm B/L, no nystagmus noted; hearing grossly diminished requiring  while cochlear implant is active. Mucous membrane moist, uvula midline, no pharyngeal erythema, exudates or discharges.      Neck: Supple. no nuchal rigidity.      Cardiovascular: well perfused, no cyanosis        Respiratory: Symmetric chest rise noted       Musculoskeletal: Muscle tone noted to be adequate for patient age, muscle mass is WNL.   No spontaneous movements or fasciculations noted during this examination.       Extremities: No pedal edema or calf tenderness.   No cogwheel rigidity noted on B/L UE extremities.      Mild spasticity present in the proximal lower extremities.  Spasticity seems more significant in the bilateral hands and feet/ankles.  Ankles/feet and bilateral fingers have evidence of contractures     Neurological Examination.    Mental status: AA&O x3; Affect/mood is euthymic/congruent; no aphasia noted during examination. " Patient answers simple questions appropriately & follows simple commands; no expressive aphasia; no adeola-neglect or extinction. Vocabulary/word finding: excellent.  Speech is somewhat dysarthric but endorsed as baseline due to hearing difficulties     Cranial Nerves: II-XII grossly intact. visual fields intact to confrontation, EOMI, no nystagmus, PERRLA,?facial muscles symmetric and no facial droop noted, patient hearing is grossly intact b/l, ?facial sensation to sharp and light touch grossly intact B/L. Patient smiles, frowns, closes eyes ?forcefully uneventfully. Uvula midline and no difficulty with pronunciation. No SCM/trapezius/muscle of mastication weakness noted B/L. Patient has adequate control of tongue and may protrude it and move it adequately.       Muscle Function:  Tone mildly increased throughout bilateral lower extremities.  Muscle bulk intact given months of immobile status.  4/5 throughout proximal bilateral upper extremity muscle groups.  3-4/5 bilateral  strength with poor dexterity of the fingers.  Able to lift the bilateral lower extremities minimally off the stretcher and can weakly bend each knee representing at least 3/5 throughout.  Lower extremity strength exam is somewhat limited by severe sensitivity/pain in the bilateral feet     Sensory: ?Intact to light touch and vibratory sensation in the extremities     Reflexes: Left and Right biceps, triceps, brachioradialis are 3+/4. patellar 1+/4 on Left and 1+/4 on Right, B/L Achilles 1+/4; Babinskis were not tolerated     Gait: adequate casual gait with stride length and arm swing WNL. Tandem gait and walking on toes and heels are WNL     Other:  Extensive chart review conducted in Care everywhere including available labs    Assessment/Plan:   Francis Bains is a 32 y.o. female presenting with a complicated medical history over the last year.  Patient and family describe a pattern of symmetric and slowly progressive weakness that  evolved over a several month timeframe and came to include complete paralysis of all 4 extremities with no effect on bowel/bladder control, respiration, or swallowing then slowly improving to present day condition. 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  Given the documented history of cerebral palsy and unknown underlying spasticity prior to these health events, physical exam is complicated.  The patient has developed contractures at the ankles and hands which were reportedly not present before these health events and has not yet undergone EMG/NCV.  Given the limited information regarding multiple hospitalizations and reportedly extensive workup thus far, we will obtain necessary releases to retrieve medical records and upload to our system.  We had an extensive conversation regarding the importance of emergency evaluation if the patient starts to experience worsening weakness or even respiratory complaints. We will assist with re-establishing care with home health and home physical therapy.  This case would greatly benefit from the expertise of a neuromuscular specialist who could complete EMG/NCV while aligning previous workup with next steps given the described timeline.  Referral initiated.    1. History of progressive weakness  Ambulatory consult to Neurology    Ambulatory referral/consult to Home Health    EMG W/ ULTRASOUND AND NERVE CONDUCTION TEST 2 Extremities      2. Spastic cerebral palsy, congenital  Ambulatory referral/consult to Neurology    Ambulatory consult to Neurology    Ambulatory referral/consult to Home Health      3. Neuropathy  Ambulatory referral/consult to Neurology    EMG W/ ULTRASOUND AND NERVE CONDUCTION TEST 2 Extremities      4. Weakness        5. Abnormal neurological exam  Ambulatory consult to Neurology    Ambulatory referral/consult to Home Health          I spent a total of 60 minutes on the day of the visit. This includes face to face time and non-face to face time preparing to see the  patient (eg, review of tests), obtaining and/or reviewing separately obtained history, documenting clinical information in the electronic or other health record, independently interpreting results and communicating results to the patient/family/caregiver, or care coordinator.    A dictation device was used to produce this document. Use of such devices sometimes results in grammatical errors or replacement of words that sound similarly.    Noe Kolb, DO

## 2023-01-18 ENCOUNTER — TELEPHONE (OUTPATIENT)
Dept: NEUROLOGY | Facility: CLINIC | Age: 33
End: 2023-01-18
Payer: MEDICAID

## 2023-01-25 ENCOUNTER — TELEPHONE (OUTPATIENT)
Dept: NEUROLOGY | Facility: CLINIC | Age: 33
End: 2023-01-25
Payer: MEDICAID

## 2023-01-25 NOTE — TELEPHONE ENCOUNTER
Spoke with Dr Weir, would like to get this pt scheduled for an EMG. After EMG resulted, Dr. Weir will determine if this pt should follow up with her or Dr. Rodriguez. Please call the pt to schedule EMG.

## 2023-01-25 NOTE — TELEPHONE ENCOUNTER
Message sent to Dr Weir to review aby to determine appt urgency.; Spoke with the pt's mother, informed her that I will be calling her back after Dr. Weir reviews her chart to determine appt. Mother v/u

## 2023-01-25 NOTE — TELEPHONE ENCOUNTER
----- Message from Elke Aceves sent at 1/25/2023 11:57 AM CST -----  Contact: [facility] [phone]  Patient is returning a phone call.    Who left a message for the patient: nurse    Does patient know what this is regarding:  scheduling an appt    Would you like a call back, or a response through your [facility] portal?:   call back    Comments:

## 2023-01-26 ENCOUNTER — TELEPHONE (OUTPATIENT)
Dept: NEUROLOGY | Facility: CLINIC | Age: 33
End: 2023-01-26
Payer: MEDICAID

## 2023-01-26 NOTE — TELEPHONE ENCOUNTER
Spoke with the pts mother. Advised that Dr. Weir has reviewed the chart and determined the pt will need to have EMG performed by her to determine what speciality is best for her. Appt scheduled, directions and contact info provided.

## 2023-01-27 ENCOUNTER — TELEPHONE (OUTPATIENT)
Dept: NEUROLOGY | Facility: CLINIC | Age: 33
End: 2023-01-27
Payer: MEDICAID

## 2023-03-27 ENCOUNTER — PROCEDURE VISIT (OUTPATIENT)
Dept: NEUROLOGY | Facility: CLINIC | Age: 33
End: 2023-03-27
Payer: MEDICAID

## 2023-03-27 DIAGNOSIS — G72.9 MYOPATHY: ICD-10-CM

## 2023-03-27 DIAGNOSIS — G60.9 IDIOPATHIC PERIPHERAL NEUROPATHY: Primary | ICD-10-CM

## 2023-03-27 PROCEDURE — 95886 MUSC TEST DONE W/N TEST COMP: CPT | Mod: 26,S$PBB,, | Performed by: PSYCHIATRY & NEUROLOGY

## 2023-03-27 PROCEDURE — 95911 NRV CNDJ TEST 9-10 STUDIES: CPT | Mod: 26,S$PBB,, | Performed by: PSYCHIATRY & NEUROLOGY

## 2023-03-27 PROCEDURE — 95886 PR EMG COMPLETE, W/ NERVE CONDUCTION STUDIES, 5+ MUSCLES: ICD-10-PCS | Mod: 26,S$PBB,, | Performed by: PSYCHIATRY & NEUROLOGY

## 2023-03-27 PROCEDURE — 95911 NRV CNDJ TEST 9-10 STUDIES: CPT | Mod: PBBFAC,PO | Performed by: PSYCHIATRY & NEUROLOGY

## 2023-03-27 PROCEDURE — 95911 PR NERVE CONDUCTION STUDY; 9-10 STUDIES: ICD-10-PCS | Mod: 26,S$PBB,, | Performed by: PSYCHIATRY & NEUROLOGY

## 2023-03-27 PROCEDURE — 95886 MUSC TEST DONE W/N TEST COMP: CPT | Mod: PBBFAC,PO | Performed by: PSYCHIATRY & NEUROLOGY

## 2023-04-20 ENCOUNTER — TELEPHONE (OUTPATIENT)
Dept: NEUROLOGY | Facility: CLINIC | Age: 33
End: 2023-04-20
Payer: MEDICAID

## 2023-04-24 ENCOUNTER — TELEPHONE (OUTPATIENT)
Dept: NEUROLOGY | Facility: CLINIC | Age: 33
End: 2023-04-24
Payer: MEDICAID

## 2023-04-24 NOTE — TELEPHONE ENCOUNTER
I left VM stating that patient is scheduled for the soonest EMG appt available, which is 6/1/23 at 1:00pm at the [facility] location.     ----- Message from Tonie Ortiz MA sent at 4/21/2023  4:25 PM CDT -----    ----- Message -----  From: Connor Isaac  Sent: 4/21/2023  10:42 AM CDT  To: Kennedi Liu Staff    Type:  Patient Returning Call    Who Called:mom  Who Left Message for Patient:omar  Does the patient know what this is regarding?:call back regarding test  Would the patient rather a call back or a response via [facility]? Call   Best Call Back Number:[phone]  Additional Information:

## 2023-04-26 ENCOUNTER — TELEPHONE (OUTPATIENT)
Dept: NEUROLOGY | Facility: CLINIC | Age: 33
End: 2023-04-26
Payer: MEDICAID

## 2023-04-26 NOTE — TELEPHONE ENCOUNTER
Spoke with the pt's mother, informed her Dr. Weir has reviewed the pts EMG and determined the pt would be better to see PM&R for possible botox to manage spasticity. Pt prefers to have provider in [place]. Dr. Weir advised to est care with [facility] for spasticity management or she can be scheduled with Dr Rodriguez here in [place]. The mother reports she will call [facility] to request an appt, if nothing available, she will call to schedule with Dr Rodriguez.

## 2023-04-26 NOTE — TELEPHONE ENCOUNTER
LVM returning patient's mother (Nette) message about an appt with Dr. Rodriguez in the PM&R Dept.  Informed a message has been sent that Dept and his staff should give her a call to schedule.

## 2023-04-27 ENCOUNTER — TELEPHONE (OUTPATIENT)
Dept: NEUROLOGY | Facility: CLINIC | Age: 33
End: 2023-04-27
Payer: MEDICAID

## 2023-05-01 NOTE — PROCEDURES
[facility]  [address] [facility] [address]  [place] [address]  [phone]      Full Name: Francis Domingo Gender: Female  Patient ID: [id number] YOB: 1990      Visit Date: 3/27/2023 1:15 PM  Age: 33 Years  Examining Physician: Saba Weir D.O., ABPN, AOBNP, ABEM   Referring Physician: Noe Kolb DO  Technologist: RAMY Rodriguez   Height: 5 feet 4 inch  History: Patient has generalized weakness.  This started May of 2022.  She has been referred for  evaluation of an upper and lower extremity for myopathy versus neuropathy versus other.      Sensory NCS      Nerve / Sites Rec. Site Onset Lat Peak Lat NP Amp Segments Distance Velocity Temp.     ms ms µV  cm m/s °C   R Median - Digit II (Antidromic)      Wrist Dig II NR NR NR Wrist - Dig II 13 NR 33.4      Ref.   ?3.40 ?20.0 Ref.  ?50    R Ulnar - Digit V (Antidromic)      Wrist Dig V 4.71 6.10 18.5 Wrist - Dig V 11 23 33.4      Ref.   ?3.10 ?15.0 Ref.  ?50    R Sural - Ankle (Calf)      Calf Ankle NR NR NR Calf - Ankle 14 NR 33.4      Ref.   ?4.50 ?5.0 Ref.  ?40    R Superficial peroneal - Ankle      Lat leg Ankle NR NR NR Lat leg - Ankle 12 NR 33.4      Ref.   ?4.50 ?5.0 Ref.          Motor NCS      Nerve / Sites Muscle Latency Ref. Amplitude Ref. Amp % Duration Segments Distance Lat Diff Ref. Velocity Ref. Temp.     ms ms mV mV % ms  cm ms ms m/s m/s °C   R Median - APB      Wrist APB 3.81 ?3.90 1.3 ?6.0 100 7.19 Wrist - APB      33.4      Elbow APB 7.98  1.2  92.6 7.12 Elbow - Wrist 20 4.17  48 ?50 33.4   R Ulnar - ADM      Wrist ADM 2.73 ?3.10 2.1 ?7.0 100 6.21 Wrist - ADM      33.4      B.Elbow ADM 7.33  1.5  72.8 6.69 B.Elbow - Wrist 21 4.60  46 ?50 33.4      A.Elbow ADM 9.88  1.2  56 8.40 A.Elbow - B.Elbow 12 2.54  47  33.4   R Peroneal - EDB      Ankle EDB NR ?5.50 NR ?3.0 NR NR Ankle - EDB      33.4   R Peroneal - Tib Ant      Fib Head Tib Ant 3.02 ?4.00 0.3 ?4.0 100  Fib Head - Tib Ant       33.4      Pop fossa Tib Ant NR  NR  NR NR Pop fossa - Fib Head  NR   ?40 33.4   R Tibial - AH      Ankle AH NR ?6.00 NR ?6.0 NR NR Ankle - AH      33.4       F  Wave      Nerve Fmin Ref.    ms ms   R Median - APB 41.77 ?31.00   R Ulnar - ADM 30.83 ?32.00   R Peroneal - EDB NR ?56.00   R Tibial - AH NR ?56.00       EMG Summary Table     Spontaneous Recruitment Activation Duration Amplitude Polyphasia Comment   Muscle Ins Act Fib Fasc Pattern - - - - -   R. First dorsal interosseous Normal 0 0 Sl Dec Normal N/+1 N/+1 N/+1 Normal   R. Abductor pollicis brevis Normal 0 0 Sl Dec Normal N/+1 N/+1 N/+1 Normal   R. Pronator teres Normal 0 0 Normal Normal Normal Normal Normal Normal   R. Extensor digitorum communis Normal 0 0 Normal Normal Normal Normal Normal Normal   R. Biceps brachii Normal 0 0 Normal Normal Normal Normal Normal Normal   R. Triceps brachii Normal 0 0 Normal Normal Normal Normal Normal Normal   R. Deltoid Normal 0 0 Normal Normal Normal Normal Normal Normal   R. Tibialis anterior Inc +2 0 Mod Dec Fair +1 N/+1 +1 Normal   R. Gastrocnemius (Medial head) Dec 0 0 None None    No Motor Units   R. Extensor hallucis longus Inc +2 0 None None    No Motor Units   R. Flexor digitorum longus Dec 0 0 None None    No Motor Units   R. Vastus medialis Normal 0 0 Normal Normal Normal Normal Normal Normal   R. Tensor fasciae latae Normal 0 0 Early Normal +1 -1 +2 Myopathic   R. Biceps femoris (short head) Normal 0 0 Early Normal +1 N/-1 +2 Myopathic         Francis Domingo [id number] 3/27/2023 1:15 PM     4 of 4    Summary: Nerve conduction studies were performed on the right upper and lower extremities.  The study had some limitations due to the patient being strapped onto a stretcher.  Right median sensory response was absent.  Right ulnar sensory response was prolonged with a normal amplitude.  Right sural and superficial peroneal sensory responses were absent.  Right median and ulnar motor responses were normal in  latency with a reduced amplitude and slightly reduced velocity.  Right peroneal motor response recording the extensor digitorum brevis muscle was absent.  Right peroneal motor response recording the tibialis anterior was markedly reduced in amplitude with a normal latency.  Right tibial motor response was absent.  Right median minimal F-wave latency was prolonged.  Right ulnar minmal F-wave latency was normal.  Right peroneal and tibial F-waves were absent.  Needle EMG was performed in the right upper and lower extremities.  Active denervation was present in the right tibialis anterior and extensor hallucis longus muscles.  Motor units were absent in the right medial gastrocnemius, extensor hallucis longus, and flexor digitorum longus muscles.  Reduced recruitment was noted in the right tibialis anterior.  Motor units were large, long and polyphasic with reduced recruitment in the right first dorsal interosseous, abductor pollicis brevis, tibialis anterior.  Motor units were small and polyphasic with early recruitment in a myopathic pattern in the right tensor fascia dg and short head of the biceps femoris muscles.  All other motor unit morphology and recruitment patterns were normal.    Impression: This is a markedly abnormal EMG of the right upper and lower extremities.  The findings are as follows:  Subacute on chronic, severe, peripheral polyneuropathy that is axonal in nature with active denervation.    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    The combination of neuropathy and myopathy can be seen in chronic neuropathies, this can be seen in rare myopathies such as inclusion body myositis and can be seen in acute onset conditions such as critical illness neuropathy with myopathy to name a few.  There is no evidence of any other focal neuropathy, plexopathy or radiculopathy on this study.        Thank you for referring to the [facility] in [place]. Please feel free to contact the clinic if you have any further questions regarding this study or report.       _____________________________  Saba Weir D.O., ABPN, AOBNP, LAISHA Domingo [id number] 3/27/2023 1:15 PM     4 of 4

## 2023-05-15 ENCOUNTER — TELEPHONE (OUTPATIENT)
Dept: NEUROLOGY | Facility: CLINIC | Age: 33
End: 2023-05-15
Payer: MEDICAID

## 2023-05-15 NOTE — TELEPHONE ENCOUNTER
Spoke with patient's mother regarding the future EMG appt with Dr. Powers on June 1st.  I informed mom to contact Dr. Kolb office and inform them if the EMG appt in June is as error.  Also discuss any other question she may have regarding Neurology.

## 2023-05-15 NOTE — TELEPHONE ENCOUNTER
----- Message from Niya Manning MA sent at 5/15/2023 12:29 PM CDT -----  Contact: mom  Hello, I believe this patient is trying to call you back.    Have a great day,  Niya     ----- Message -----  From: Connor Isaac  Sent: 5/15/2023  12:18 PM CDT  To: Kennedi Liu Staff    Type:  Patient Returning Call    Who Called:mom  Who Left Message for Patient:mya  Does the patient know what this is regarding?:appt  Would the patient rather a call back or a response via [facility]? Call   Best Call Back Number: [phone]  Additional Information:

## 2023-05-22 ENCOUNTER — OFFICE VISIT (OUTPATIENT)
Dept: PHYSICAL MEDICINE AND REHAB | Facility: CLINIC | Age: 33
End: 2023-05-22
Payer: MEDICAID

## 2023-05-22 VITALS
HEART RATE: 77 BPM | BODY MASS INDEX: 35.75 KG/M2 | HEIGHT: 63 IN | WEIGHT: 201.75 LBS | OXYGEN SATURATION: 100 % | SYSTOLIC BLOOD PRESSURE: 126 MMHG | DIASTOLIC BLOOD PRESSURE: 84 MMHG

## 2023-05-22 DIAGNOSIS — Z74.09 IMPAIRED MOBILITY AND ACTIVITIES OF DAILY LIVING: ICD-10-CM

## 2023-05-22 DIAGNOSIS — Z78.9 IMPAIRED MOBILITY AND ACTIVITIES OF DAILY LIVING: ICD-10-CM

## 2023-05-22 DIAGNOSIS — G80.1 CEREBRAL PALSY WITH SPASTIC DIPLEGIA: Primary | ICD-10-CM

## 2023-05-22 PROCEDURE — 3074F SYST BP LT 130 MM HG: CPT | Mod: CPTII,,, | Performed by: PHYSICAL MEDICINE & REHABILITATION

## 2023-05-22 PROCEDURE — 3008F PR BODY MASS INDEX (BMI) DOCUMENTED: ICD-10-PCS | Mod: CPTII,,, | Performed by: PHYSICAL MEDICINE & REHABILITATION

## 2023-05-22 PROCEDURE — 3079F DIAST BP 80-89 MM HG: CPT | Mod: CPTII,,, | Performed by: PHYSICAL MEDICINE & REHABILITATION

## 2023-05-22 PROCEDURE — 99999 PR PBB SHADOW E&M-EST. PATIENT-LVL V: CPT | Mod: PBBFAC,,, | Performed by: PHYSICAL MEDICINE & REHABILITATION

## 2023-05-22 PROCEDURE — 3074F PR MOST RECENT SYSTOLIC BLOOD PRESSURE < 130 MM HG: ICD-10-PCS | Mod: CPTII,,, | Performed by: PHYSICAL MEDICINE & REHABILITATION

## 2023-05-22 PROCEDURE — 99215 OFFICE O/P EST HI 40 MIN: CPT | Mod: PBBFAC,PN | Performed by: PHYSICAL MEDICINE & REHABILITATION

## 2023-05-22 PROCEDURE — 99205 OFFICE O/P NEW HI 60 MIN: CPT | Mod: S$PBB,,, | Performed by: PHYSICAL MEDICINE & REHABILITATION

## 2023-05-22 PROCEDURE — 99205 PR OFFICE/OUTPT VISIT, NEW, LEVL V, 60-74 MIN: ICD-10-PCS | Mod: S$PBB,,, | Performed by: PHYSICAL MEDICINE & REHABILITATION

## 2023-05-22 PROCEDURE — 3008F BODY MASS INDEX DOCD: CPT | Mod: CPTII,,, | Performed by: PHYSICAL MEDICINE & REHABILITATION

## 2023-05-22 PROCEDURE — 3079F PR MOST RECENT DIASTOLIC BLOOD PRESSURE 80-89 MM HG: ICD-10-PCS | Mod: CPTII,,, | Performed by: PHYSICAL MEDICINE & REHABILITATION

## 2023-05-22 PROCEDURE — 1160F PR REVIEW ALL MEDS BY PRESCRIBER/CLIN PHARMACIST DOCUMENTED: ICD-10-PCS | Mod: CPTII,,, | Performed by: PHYSICAL MEDICINE & REHABILITATION

## 2023-05-22 PROCEDURE — 1160F RVW MEDS BY RX/DR IN RCRD: CPT | Mod: CPTII,,, | Performed by: PHYSICAL MEDICINE & REHABILITATION

## 2023-05-22 PROCEDURE — 1159F MED LIST DOCD IN RCRD: CPT | Mod: CPTII,,, | Performed by: PHYSICAL MEDICINE & REHABILITATION

## 2023-05-22 PROCEDURE — 1159F PR MEDICATION LIST DOCUMENTED IN MEDICAL RECORD: ICD-10-PCS | Mod: CPTII,,, | Performed by: PHYSICAL MEDICINE & REHABILITATION

## 2023-05-22 PROCEDURE — 99999 PR PBB SHADOW E&M-EST. PATIENT-LVL V: ICD-10-PCS | Mod: PBBFAC,,, | Performed by: PHYSICAL MEDICINE & REHABILITATION

## 2023-05-22 NOTE — PROGRESS NOTES
[facility] VISIT     Consulting Provider: Dr. Saba Mckeon*  Primary Care Provider: Bri Garnica MD     CHIEF COMPLAINT:   1.   Chief Complaint   Patient presents with    Other Misc     Botox eval      2. Spasticity management recommendations.     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     They are here today with mother and family member.     Patient with hearing impairment requiring ASL interrupter present during encounter.     Reports, severe debility since illness May 26, 2022.  She reports being bed-bound since this.  She does report some slight improvement over the past month with improve use of her hands and now with noticeable increase range of motion and motor function to the right-greater-than-left legs.  Her main complaint is significant leg weakness and ankle/foot contractures with nerve pains in predominantly her feet.  Does report history of cerebral palsy with underlying spastic tone however was mobile without use of assistive devices symptoms worse since her illness a year ago.    Medications: gabapentin  - has trialed oral baclofen with significant side effects  Injections: none  Surgical procedures: none    EQUIPMENT:  Braces: none currently  Wheelchair: has access  Walker: none    MOBILITY/TRANSFERS:  Walking: non-ambulatory since May 26th, 2022     ACTIVITIES OF DAILY LIVING:  Upper extremity dressing: modA  Lower extremity dressing: dep  Bathe: dep  Groom: dep  Toilet: dep    THERAPY/LOCATION:  PT: none currently  OT: none currently  Speech: none    EDUCATION/VOCATION:  Highest level of education: college  Pre-morbid Occupation: unemployed seondary to hearing impairment    RECREATION:  swims, painting    LIVING SITUATION: lives mom and dad, single story home with 1 MILY with ramp, tub/shower combination    PAST MEDICAL HISTORY:  Past Medical History:   Diagnosis Date    Cerebral palsy     Hearing impaired person     Hypertension     Osteopenia determined by x-ray     Oswaldo syndrome         PAST SURGICAL HISTORY:   Past Surgical History:   Procedure Laterality Date    CHOLECYSTECTOMY      INNER EAR SURGERY      RENAL BIOPSY  07/07/14        FAMILY HISTORY:   Family History   Problem Relation Age of Onset    Breast cancer Neg Hx     Colon cancer Neg Hx     Ovarian cancer Neg Hx        SOCIAL HISTORY:    Social History     Socioeconomic History    Marital status: Single   Tobacco Use    Smoking status: Former     Packs/day: 0.25     Years: 9.00     Pack years: 2.25     Types: Cigarettes    Smokeless tobacco: Never   Substance and Sexual Activity    Alcohol use: Yes     Comment: Social    Drug use: Yes     Types: Marijuana    Sexual activity: Yes     Partners: Male     Birth control/protection: Inserts     Comment: Single.       MEDICATIONS:     Current Outpatient Medications:     acetaminophen (TYLENOL) suppository, Place 325 mg rectally every 4 (four) hours as needed for Temperature greater than., Disp: , Rfl:     amlodipine (NORVASC) 10 MG tablet, Take 10 mg by mouth once daily., Disp: , Rfl: 0    carvedilol (COREG) 6.25 MG tablet, 12.5 mg. , Disp: , Rfl: 2    famotidine (PEPCID) 20 MG tablet, TAKE 1 TABLET PO TWICE A DAY, Disp: , Rfl: 4    fluticasone propionate (FLONASE) 50 mcg/actuation nasal spray, 1 spray by Each Nostril route once daily., Disp: , Rfl:     gabapentin (NEURONTIN) 100 MG capsule, Take 100 mg by mouth 3 (three) times daily., Disp: , Rfl:     ondansetron (ZOFRAN-ODT) 4 MG TbDL, Take 1 tablet (4 mg total) by mouth every 8 (eight) hours as needed (nausea)., Disp: 20 tablet, Rfl: 0    pantoprazole (PROTONIX) 40 MG tablet, TK 1 T PO  D, Disp: , Rfl:     sertraline (ZOLOFT) 100 MG  tablet, Take 0.5 tablets (50 mg total) by mouth every evening., Disp: 30 tablet, Rfl: 0    sodium bicarbonate 650 MG tablet, Take 0.5 tablets (325 mg total) by mouth once daily., Disp: 15 tablet, Rfl: 0    traZODone (DESYREL) 50 MG tablet, Patient needs refill, Disp: , Rfl:     alendronate (FOSAMAX) 70 MG tablet, Take 1 tablet (70 mg total) by mouth every 7 days., Disp: 4 tablet, Rfl: 11    ergocalciferol (ERGOCALCIFEROL) 50,000 unit Cap, Vitamin D2 50,000 unit capsule, Disp: , Rfl:     estrogen, conjugated,-medroxyprogesterone 0.625-2.5mg (PREMPRO) 0.625-2.5 mg per tablet, Take 1 tablet by mouth once daily., Disp: 28 tablet, Rfl: 9     ALLERGIES:   Review of patient's allergies indicates:   Allergen Reactions    Ace inhibitors Other (See Comments)    Nabumetone Other (See Comments)    Nsaids (non-steroidal anti-inflammatory drug) Other (See Comments)    Sulfa (sulfonamide antibiotics) Hives    Pcn  [penicillins] Rash       REVIEW OF SYSTEMS: Denies coughs, colds, fevers, chills. No constipation. Bowel movements are regular. No dysphagia. No weight, appetite or sleep concerns. No behavior concerns. No drooling or difficulty handling oral secretions. No skin lesions.       PHYSICAL EXAMINATION:   VITALS:   Vitals:    05/22/23 1233   BP: 126/84   Pulse: 77     GENERAL: The patient is awake, alert, cooperative, and in no acute distress.  Lying on gurney  HEENT: Normocephalic, atraumatic. Tracking is in all 4 quadrants. No facial asymmetry. Uvula is midline.   NECK: Supple. No lymphadenopathy. No masses. Full range of motion. No torticollis.   HEART: Appears well perfused. No lower extremity edema.   LUNGS: No increased work of breath.   ABDOMEN: Benign.   NEURO: Cranial nerves II-XII are grossly intact by observation.   EXTREMITIES: Warm, capillary refill less than 2 seconds. No clubbing, cyanosis or edema.   MUSCULOSKELETAL:  Right finger flexor contractures.  Hypotonia to bilateral lower extremities with ankle flexor  equina varus deformities and contractures     NEUROMUSCULAR:   Modified Alicia Scale:  1:  1+:    2:  Left knee extender  3:  4:  Bilateral plantar flexors    Manual muscle testing:      Right  Left  Shoulder abduction  4/5  4/5   Elbow flexion   4/5  4/5  Elbow extension  4/5  4/5  Wrist extension  3/5  4/5  Finger flexion   3/5  4/5  Finger abduction  3/5  4/5    Hip flexion   NT  NT  Knee extension  3/5  3/5  Knee flexion   3/5  3/5  Ankle dorsiflexion  NT  NT  Ankle plantarflexion  NT  NT  Great toe extension  NT  NT  *MMT difficult in BLE secondary to foot/ankle pains    No dyskinetic or dystonic movements appreciated.   Muscle stretch reflexes are 2+ throughout both upper and lower extremities.   No clonus was elicited at either ankle.    GAIT/DYNAMIC: not tested, patient bed bound without willingness to transfer    ASSESSMENT:   1. Cerebral palsy with spastic diplegia  Ambulatory referral/consult to Home Health      2. 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 The following recommendations and plan were discussed in depth with the patient who voiced understanding and was in agreement.     PLAN:   Spasticity:   - severe spasticity to bilateral ankle plantarflexors with contractures and equinovarus deformity.   - patient with CKD4 and poor response to oral baclofen in past  - needs focal neurotoxin injections, will start with 1500 units Dysport to the following muscles:  Right soleus   500 units  Right medial gastrocnemius 250 units  Left soleus   500 units  Left medial gastrocnemius 250 units    Bracing:    - continus podus like boots for offloading in bed  - will consider need for higher level devices after completion of above injections, may need HKAFOs for  ambulation    Equipment:   - continue use of MWC as main form of ambulation at this time  - will consider need for sit-to-stand device after completion of injections and braces for gait training    Therapy:   - start HH PT/OT    I would like to have her  return to clinic for planned 1500 units Dysport to BLE.     A copy of today's visit will be made available to Dr. Saba Mckeon*, consulting provider.     65 minutes of total time spent on the encounter, which includes face to face time and non-face to face time preparing to see the patient (eg, review of tests), obtaining and/or reviewing separately obtained history, documenting clinical information in the electronic or other health record, independently interpreting results (not separately reported), communicating results to the patient/family/caregiver, and/or care coordination (not separately reported).

## 2023-06-08 ENCOUNTER — TELEPHONE (OUTPATIENT)
Dept: PHYSICAL MEDICINE AND REHAB | Facility: CLINIC | Age: 33
End: 2023-06-08
Payer: MEDICAID

## 2023-06-12 ENCOUNTER — PATIENT OUTREACH (OUTPATIENT)
Dept: ADMINISTRATIVE | Facility: HOSPITAL | Age: 33
End: 2023-06-12
Payer: MEDICAID

## 2023-06-12 ENCOUNTER — TELEPHONE (OUTPATIENT)
Dept: PHYSICAL MEDICINE AND REHAB | Facility: CLINIC | Age: 33
End: 2023-06-12
Payer: MEDICAID

## 2023-06-13 ENCOUNTER — EXTERNAL HOME HEALTH (OUTPATIENT)
Dept: HOME HEALTH SERVICES | Facility: HOSPITAL | Age: 33
End: 2023-06-13
Payer: MEDICAID

## 2023-06-16 ENCOUNTER — TELEPHONE (OUTPATIENT)
Dept: PHYSICAL MEDICINE AND REHAB | Facility: CLINIC | Age: 33
End: 2023-06-16
Payer: MEDICAID

## 2023-06-19 ENCOUNTER — PATIENT OUTREACH (OUTPATIENT)
Dept: ADMINISTRATIVE | Facility: HOSPITAL | Age: 33
End: 2023-06-19
Payer: MEDICAID

## 2023-06-28 ENCOUNTER — TELEPHONE (OUTPATIENT)
Dept: ADMINISTRATIVE | Facility: CLINIC | Age: 33
End: 2023-06-28
Payer: MEDICAID

## 2023-06-28 DIAGNOSIS — G80.1 CEREBRAL PALSY WITH SPASTIC DIPLEGIA: Primary | ICD-10-CM

## 2023-06-28 NOTE — TELEPHONE ENCOUNTER
Returned call to [facility] with home health. Order placed for sliding board per request.

## 2023-07-06 ENCOUNTER — PATIENT OUTREACH (OUTPATIENT)
Dept: ADMINISTRATIVE | Facility: HOSPITAL | Age: 33
End: 2023-07-06
Payer: MEDICAID

## 2023-08-07 ENCOUNTER — PATIENT OUTREACH (OUTPATIENT)
Dept: ADMINISTRATIVE | Facility: HOSPITAL | Age: 33
End: 2023-08-07
Payer: MEDICAID

## 2023-08-07 NOTE — PROGRESS NOTES
Working HTN Report.     Per Care Everywhere, pt est care with new pcp, 9/20/22 and had last follow up with them March 2023.  Chart updated.

## 2023-08-21 ENCOUNTER — TELEPHONE (OUTPATIENT)
Dept: PHYSICAL MEDICINE AND REHAB | Facility: CLINIC | Age: 33
End: 2023-08-21
Payer: MEDICAID

## 2023-08-21 NOTE — TELEPHONE ENCOUNTER
Called pt to reschedule Dysport injections. Pt mother stated yes and appointment was made. All questions answered.

## 2023-09-05 ENCOUNTER — TELEPHONE (OUTPATIENT)
Dept: PHYSICAL MEDICINE AND REHAB | Facility: CLINIC | Age: 33
End: 2023-09-05
Payer: MEDICAID

## 2023-09-05 NOTE — TELEPHONE ENCOUNTER
Informed pt mother that we do have a 3 time cancellation which means if they she cancels the same appointment for a 3rd time then we are unable to see the patient. Pt mother understood and stated she would give us a call back to reschedule injections.

## 2023-09-15 ENCOUNTER — TELEPHONE (OUTPATIENT)
Dept: PHYSICAL MEDICINE AND REHAB | Facility: CLINIC | Age: 33
End: 2023-09-15
Payer: MEDICAID

## 2023-09-15 NOTE — TELEPHONE ENCOUNTER
Called pt and spoke with her mother and as able to get pt scheduled for her dysport injection. No further needs at this time.   Jailyn Page RN

## 2023-09-25 ENCOUNTER — CLINICAL SUPPORT (OUTPATIENT)
Dept: PHYSICAL MEDICINE AND REHAB | Facility: CLINIC | Age: 33
End: 2023-09-25
Payer: MEDICAID

## 2023-09-25 VITALS — SYSTOLIC BLOOD PRESSURE: 143 MMHG | DIASTOLIC BLOOD PRESSURE: 91 MMHG | HEART RATE: 66 BPM

## 2023-09-25 DIAGNOSIS — G80.1 CEREBRAL PALSY WITH SPASTIC DIPLEGIA: Primary | ICD-10-CM

## 2023-09-25 PROCEDURE — 99999PBSHW PR PBB SHADOW TECHNICAL ONLY FILED TO HB: ICD-10-PCS | Mod: JZ,PBBFAC,,

## 2023-09-25 PROCEDURE — 95874 BOTULINUM INJECTION: ICD-10-PCS | Mod: 26,S$PBB,, | Performed by: PHYSICAL MEDICINE & REHABILITATION

## 2023-09-25 PROCEDURE — 99999 PR PBB SHADOW E&M-EST. PATIENT-LVL III: ICD-10-PCS | Mod: PBBFAC,,, | Performed by: PHYSICAL MEDICINE & REHABILITATION

## 2023-09-25 PROCEDURE — 64643 PR CHEMODENERV 1 EXT; EA ADD'L EXT, 1-4 MUSCLE(S): ICD-10-PCS | Mod: S$PBB,,, | Performed by: PHYSICAL MEDICINE & REHABILITATION

## 2023-09-25 PROCEDURE — 64642 CHEMODENERV 1 EXTREMITY 1-4: CPT | Mod: PBBFAC,PN | Performed by: PHYSICAL MEDICINE & REHABILITATION

## 2023-09-25 PROCEDURE — 95874 GUIDE NERV DESTR NEEDLE EMG: CPT | Mod: 26,S$PBB,, | Performed by: PHYSICAL MEDICINE & REHABILITATION

## 2023-09-25 PROCEDURE — 99499 UNLISTED E&M SERVICE: CPT | Mod: S$PBB,,, | Performed by: PHYSICAL MEDICINE & REHABILITATION

## 2023-09-25 PROCEDURE — 64643 CHEMODENERV 1 EXTREM 1-4 EA: CPT | Mod: S$PBB,,, | Performed by: PHYSICAL MEDICINE & REHABILITATION

## 2023-09-25 PROCEDURE — 99499 NO LOS: ICD-10-PCS | Mod: S$PBB,,, | Performed by: PHYSICAL MEDICINE & REHABILITATION

## 2023-09-25 PROCEDURE — 99999 PR PBB SHADOW E&M-EST. PATIENT-LVL III: CPT | Mod: PBBFAC,,, | Performed by: PHYSICAL MEDICINE & REHABILITATION

## 2023-09-25 PROCEDURE — 64642 BOTULINUM INJECTION: ICD-10-PCS | Mod: S$PBB,,, | Performed by: PHYSICAL MEDICINE & REHABILITATION

## 2023-09-25 PROCEDURE — 99999PBSHW PR PBB SHADOW TECHNICAL ONLY FILED TO HB: Mod: JZ,PBBFAC,,

## 2023-09-25 PROCEDURE — 64643 CHEMODENERV 1 EXTREM 1-4 EA: CPT | Mod: PBBFAC,PN | Performed by: PHYSICAL MEDICINE & REHABILITATION

## 2023-09-25 RX ADMIN — PALOVAROTENE 1500 UNITS: 5 CAPSULE ORAL at 03:09

## 2023-09-25 NOTE — PROGRESS NOTES
[facility] PROCEDURE NOTE    CHIEF COMPLAINT:   Chief Complaint   Patient presents with    Injections     Dysport       HISTORY OF PRESENT ILLNESS: Francis Bains is a 33 y.o. female who presents to me for planned procedure/injection of focal neurotoxin for management of the below noted diagnosis.     Medications:   Current Outpatient Medications on File Prior to Visit   Medication Sig Dispense Refill    acetaminophen (TYLENOL) suppository Place 325 mg rectally every 4 (four) hours as needed for Temperature greater than.      alendronate (FOSAMAX) 70 MG tablet Take 1 tablet (70 mg total) by mouth every 7 days. 4 tablet 11    amlodipine (NORVASC) 10 MG tablet Take 10 mg by mouth once daily.  0    carvedilol (COREG) 6.25 MG tablet 12.5 mg.   2    ergocalciferol (ERGOCALCIFEROL) 50,000 unit Cap Vitamin D2 50,000 unit capsule      estrogen, conjugated,-medroxyprogesterone 0.625-2.5mg (PREMPRO) 0.625-2.5 mg per tablet Take 1 tablet by mouth once daily. 28 tablet 9    famotidine (PEPCID) 20 MG tablet TAKE 1 TABLET PO TWICE A DAY  4    fluticasone propionate (FLONASE) 50 mcg/actuation nasal spray 1 spray by Each Nostril route once daily.      gabapentin (NEURONTIN) 100 MG capsule Take 100 mg by mouth 3 (three) times daily.      ondansetron (ZOFRAN-ODT) 4 MG TbDL Take 1 tablet (4 mg total) by mouth every 8 (eight) hours as needed (nausea). 20 tablet 0    pantoprazole (PROTONIX) 40 MG tablet TK 1 T PO  D      sertraline (ZOLOFT) 100 MG tablet Take 0.5 tablets (50 mg total) by mouth every evening. 30 tablet 0    sodium bicarbonate 650 MG tablet Take 0.5 tablets (325 mg total) by mouth once daily. 15 tablet 0    traZODone (DESYREL) 50 MG tablet Patient needs refill      [DISCONTINUED] clonazePAM (KLONOPIN) 1 MG tablet Take 1 tablet (1 mg total) by mouth daily as needed for Anxiety. 30 tablet 1    [DISCONTINUED] losartan (COZAAR) 50 MG tablet losartan 50 mg tablet       No current  facility-administered medications on file prior to visit.       Allergies:   Review of patient's allergies indicates:   Allergen Reactions    Ace inhibitors Other (See Comments)    Nabumetone Other (See Comments)    Nsaids (non-steroidal anti-inflammatory drug) Other (See Comments)    Sulfa (sulfonamide antibiotics) Hives    Pcn  [penicillins] Rash       Vitals:   Vitals:    09/25/23 1612   BP: (!) 143/91   Pulse: 66       ASSESSMENT:   1. Cerebral palsy with spastic diplegia        PLAN:   1. Procedure/injection was completed for management of above diagnosis.    2. Please see procedure note from today's visit with further details.     Right soleus                            500 units  Right medial gastrocnemius   250 units  Left soleus                               500 units  Left medial gastrocnemius      250 units    RTC 1 month to assess response.

## 2023-09-25 NOTE — PROCEDURES
Botulinum Injection  Location: Limbs/Trunk    Date/Time: 9/25/2023 3:30 PM    Performed by: Yue Rodriguez DO  Authorized by: Yue Rodriguez DO      Consent:      Consent obtained:  Written     Consent given by:  Parent     Risks discussed:  Bleeding, dysphagia, infection, pain and weakness     Alternatives discussed:  No treatment    Universal protocol:      Site/side verified:  Yes       Immediately prior to procedure a time out was called:  Yes       Patient identity confirmed:  Verbally with patient    Procedure details:      EMG used?:  Yes     Diluted by:  Preservative free saline     Toxin (Brand):  AboBoNT-A (Dysport)     Comments about dilution:  2.5 cc normal saline mixed with each 500 unit vial     Concentration (u/mL):  20 units per 0.1 cc     Total number of units available:  1500     Muscle area injected: leg    Lower extremity - leg:      Right medial head gastrocnemius:  250 units divided amongst site(s)     Left medial head gastrocnemius:  250 units divided amongst site(s)     Right soleus:  500 units divided amongst 2 site(s)     Left soleus:  500 units divided amongst 2 site(s)       Total units injected:  1500     Total units wasted:  0    Medications: 1,500 Units abobotulinumtoxinA 500 unit    Post-procedure details:      Patient tolerance of procedure:  Tolerated well, no immediate complications

## 2023-10-03 ENCOUNTER — TELEPHONE (OUTPATIENT)
Dept: AUDIOLOGY | Facility: CLINIC | Age: 33
End: 2023-10-03
Payer: MEDICAID

## 2023-10-03 NOTE — TELEPHONE ENCOUNTER
----- Message from Alexa Cage sent at 10/3/2023 12:59 PM CDT -----  Regarding: cochlear  Contact: Nette [phone]  Pt mother Nette  is calling to speak with someone in provider office in regards to pt's Cochlear implants Nette stated the dog chewed the pt's implants and the pt needs to get them replaced and she is not sure what to do would like someone to call in this regard [phone]

## 2023-10-25 ENCOUNTER — TELEPHONE (OUTPATIENT)
Dept: PHYSICAL MEDICINE AND REHAB | Facility: CLINIC | Age: 33
End: 2023-10-25
Payer: MEDICAID

## 2023-10-25 NOTE — TELEPHONE ENCOUNTER
Pt and pt mother stated that due to transportation they will need to reschedule appointment  with RINA Deshpande. Appointment was rescheduled.

## 2023-11-01 ENCOUNTER — TELEPHONE (OUTPATIENT)
Dept: PHYSICAL MEDICINE AND REHAB | Facility: CLINIC | Age: 33
End: 2023-11-01
Payer: MEDICAID

## 2023-11-01 NOTE — TELEPHONE ENCOUNTER
Called pt to reschedule canceled appointment with RINA Deshpande. Pt mother stated she would call us back tomorrow once she is in town.

## 2023-11-06 ENCOUNTER — TELEPHONE (OUTPATIENT)
Dept: PHYSICAL MEDICINE AND REHAB | Facility: CLINIC | Age: 33
End: 2023-11-06
Payer: MEDICAID

## 2023-11-06 NOTE — TELEPHONE ENCOUNTER
Called pt to reschedule appointment with RINA Deshpande. Appointment was rescheduled with mother. I informed pt mother that I would call Friday to confirm and if they need to move the appointment that will be fine. I informed pt mother that if they cancel or reschedule within 48 hours of the appointment then we will no longer be able to see the patient. Pt mother understood.

## 2023-11-10 ENCOUNTER — TELEPHONE (OUTPATIENT)
Dept: PHYSICAL MEDICINE AND REHAB | Facility: CLINIC | Age: 33
End: 2023-11-10
Payer: MEDICAID

## 2023-11-10 NOTE — TELEPHONE ENCOUNTER
Called to confirm appointment with RINA Deshpande. Pt mother stated they would be there however they are both not feeling well. She said if they have to cancel she will call Monday morning.

## 2023-11-10 NOTE — TELEPHONE ENCOUNTER
----- Message from Niya Negron MA sent at 11/6/2023 10:32 AM CST -----  Call to confirm appointment with RINA Deshpande

## 2023-11-14 ENCOUNTER — OFFICE VISIT (OUTPATIENT)
Dept: PHYSICAL MEDICINE AND REHAB | Facility: CLINIC | Age: 33
End: 2023-11-14
Payer: MEDICAID

## 2023-11-14 VITALS — HEIGHT: 63 IN | WEIGHT: 201.75 LBS | BODY MASS INDEX: 35.75 KG/M2

## 2023-11-14 DIAGNOSIS — M24.572 CONTRACTURE OF LEFT ANKLE: ICD-10-CM

## 2023-11-14 DIAGNOSIS — G80.1 CEREBRAL PALSY WITH SPASTIC DIPLEGIA: Primary | ICD-10-CM

## 2023-11-14 DIAGNOSIS — Z74.09 IMPAIRED MOBILITY AND ACTIVITIES OF DAILY LIVING: ICD-10-CM

## 2023-11-14 DIAGNOSIS — Z78.9 IMPAIRED MOBILITY AND ACTIVITIES OF DAILY LIVING: ICD-10-CM

## 2023-11-14 DIAGNOSIS — M24.571 CONTRACTURE OF RIGHT ANKLE: ICD-10-CM

## 2023-11-14 PROCEDURE — 99213 OFFICE O/P EST LOW 20 MIN: CPT | Mod: PBBFAC,PO | Performed by: NURSE PRACTITIONER

## 2023-11-14 PROCEDURE — 1159F MED LIST DOCD IN RCRD: CPT | Mod: CPTII,,, | Performed by: NURSE PRACTITIONER

## 2023-11-14 PROCEDURE — 3008F PR BODY MASS INDEX (BMI) DOCUMENTED: ICD-10-PCS | Mod: CPTII,,, | Performed by: NURSE PRACTITIONER

## 2023-11-14 PROCEDURE — 3008F BODY MASS INDEX DOCD: CPT | Mod: CPTII,,, | Performed by: NURSE PRACTITIONER

## 2023-11-14 PROCEDURE — 99999 PR PBB SHADOW E&M-EST. PATIENT-LVL III: ICD-10-PCS | Mod: PBBFAC,,, | Performed by: NURSE PRACTITIONER

## 2023-11-14 PROCEDURE — 99215 PR OFFICE/OUTPT VISIT, EST, LEVL V, 40-54 MIN: ICD-10-PCS | Mod: S$PBB,,, | Performed by: NURSE PRACTITIONER

## 2023-11-14 PROCEDURE — 99215 OFFICE O/P EST HI 40 MIN: CPT | Mod: S$PBB,,, | Performed by: NURSE PRACTITIONER

## 2023-11-14 PROCEDURE — 1159F PR MEDICATION LIST DOCUMENTED IN MEDICAL RECORD: ICD-10-PCS | Mod: CPTII,,, | Performed by: NURSE PRACTITIONER

## 2023-11-14 PROCEDURE — 99999 PR PBB SHADOW E&M-EST. PATIENT-LVL III: CPT | Mod: PBBFAC,,, | Performed by: NURSE PRACTITIONER

## 2023-11-14 RX ORDER — GABAPENTIN 300 MG/1
300 CAPSULE ORAL 3 TIMES DAILY
Qty: 90 CAPSULE | Refills: 2 | Status: SHIPPED | OUTPATIENT
Start: 2023-11-14

## 2023-11-14 NOTE — PROGRESS NOTES
"[facility] VISIT     Consulting Provider: No ref. provider found  Primary Care Provider: Yue Gold     CHIEF COMPLAINT:   1.   Chief Complaint   Patient presents with    Follow-up     Dysport injections follow-up      2. Spasticity management recommendations.     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     Last seen in clinic on 9/25/23, at which time she underwent 1500 units Dysport injections to the following muscles:  Right soleus                            500 units  Right medial gastrocnemius   250 units  Left soleus                               500 units  Left medial gastrocnemius      250 units    They are here today with mother.    Patient with hearing impairment requiring ASL interrupter present during encounter.     Today reports, good results from recent Dysport injections.  Endorses improvement in pain and mobility in ankles/feet since recent injections.  She is better tolerating range of motion and touching of feet.  Ankles are still very tight and she is unable to get them "straight."  Continues to report significant neuropathic pains in feet.  She took low dose of gabapentin in the past without improvement.  Goal is to walk again.  She hasn't stood since hospitalization in 5/2022.  She is interested in bracing and would like to get therapy services again.        Medications: gabapentin  - has trialed oral baclofen with significant side effects  Injections: none  Surgical procedures: none    EQUIPMENT:  Braces: none currently  Wheelchair: has access  Walker: none    MOBILITY/TRANSFERS:  Walking: non-ambulatory since May 26th, 2022     ACTIVITIES OF DAILY LIVING:  Upper extremity dressing: " modA  Lower extremity dressing: dep  Bathe: dep  Groom: dep  Toilet: dep    THERAPY/LOCATION:  PT: none currently  OT: none currently  Speech: none    EDUCATION/VOCATION:  Highest level of education: college  Pre-morbid Occupation: unemployed seondary to hearing impairment    RECREATION: swims, painting    LIVING SITUATION: lives mom and dad, single story home with 1 MILY with ramp, tub/shower combination    PAST MEDICAL HISTORY:  Past Medical History:   Diagnosis Date    Cerebral palsy     Hearing impaired person     Hypertension     Osteopenia determined by x-ray     Oswaldo syndrome         PAST SURGICAL HISTORY:   Past Surgical History:   Procedure Laterality Date    CHOLECYSTECTOMY      INNER EAR SURGERY      RENAL BIOPSY  07/07/14        FAMILY HISTORY:   Family History   Problem Relation Age of Onset    Breast cancer Neg Hx     Colon cancer Neg Hx     Ovarian cancer Neg Hx        SOCIAL HISTORY:    Social History     Socioeconomic History    Marital status: Single   Tobacco Use    Smoking status: Former     Current packs/day: 0.25     Average packs/day: 0.3 packs/day for 9.0 years (2.3 ttl pk-yrs)     Types: Cigarettes    Smokeless tobacco: Never   Substance and Sexual Activity    Alcohol use: Yes     Comment: Social    Drug use: Yes     Types: Marijuana    Sexual activity: Yes     Partners: Male     Birth control/protection: Inserts     Comment: Single.       MEDICATIONS:     Current Outpatient Medications:     acetaminophen (TYLENOL) suppository, Place 325 mg rectally every 4 (four) hours as needed for Temperature greater than., Disp: , Rfl:     amlodipine (NORVASC) 10 MG tablet, Take 10 mg by mouth once daily., Disp: , Rfl: 0    carvedilol (COREG) 6.25 MG tablet, 12.5 mg. , Disp: , Rfl: 2    estrogen, conjugated,-medroxyprogesterone 0.625-2.5mg (PREMPRO) 0.625-2.5 mg per tablet, Take 1 tablet by mouth once daily., Disp: 28 tablet, Rfl: 9    fluticasone propionate (FLONASE) 50 mcg/actuation nasal spray, 1  spray by Each Nostril route once daily., Disp: , Rfl:     ondansetron (ZOFRAN-ODT) 4 MG TbDL, Take 1 tablet (4 mg total) by mouth every 8 (eight) hours as needed (nausea)., Disp: 20 tablet, Rfl: 0    pantoprazole (PROTONIX) 40 MG tablet, TK 1 T PO  D, Disp: , Rfl:     sertraline (ZOLOFT) 100 MG tablet, Take 0.5 tablets (50 mg total) by mouth every evening., Disp: 30 tablet, Rfl: 0    traZODone (DESYREL) 50 MG tablet, Patient needs refill, Disp: , Rfl:     alendronate (FOSAMAX) 70 MG tablet, Take 1 tablet (70 mg total) by mouth every 7 days., Disp: 4 tablet, Rfl: 11    ergocalciferol (ERGOCALCIFEROL) 50,000 unit Cap, Vitamin D2 50,000 unit capsule, Disp: , Rfl:     famotidine (PEPCID) 20 MG tablet, TAKE 1 TABLET PO TWICE A DAY, Disp: , Rfl: 4    gabapentin (NEURONTIN) 300 MG capsule, Take 1 capsule (300 mg total) by mouth 3 (three) times daily., Disp: 90 capsule, Rfl: 2    sodium bicarbonate 650 MG tablet, Take 0.5 tablets (325 mg total) by mouth once daily., Disp: 15 tablet, Rfl: 0     ALLERGIES:   Review of patient's allergies indicates:   Allergen Reactions    Ace inhibitors Other (See Comments)    Nabumetone Other (See Comments)    Nsaids (non-steroidal anti-inflammatory drug) Other (See Comments)    Sulfa (sulfonamide antibiotics) Hives    Pcn  [penicillins] Rash       REVIEW OF SYSTEMS:   Denies coughs, colds, fevers, chills. No constipation. Bowel movements are regular. No dysphagia. No weight, appetite or sleep concerns. No behavior concerns. No drooling or difficulty handling oral secretions. No skin lesions.     PHYSICAL EXAMINATION:   VITALS:   There were no vitals filed for this visit.    GENERAL: The patient is awake, alert, cooperative, and in no acute distress.  Lying on gurney  HEENT: Normocephalic, atraumatic. Tracking is in all 4 quadrants. No facial asymmetry. Uvula is midline.   NECK: Supple. No lymphadenopathy. No masses. Full range of motion. No torticollis.   HEART: Appears well perfused. No  lower extremity edema.   LUNGS: No increased work of breath.   ABDOMEN: Benign.   NEURO: Cranial nerves II-XII are grossly intact by observation.   EXTREMITIES: Warm, capillary refill less than 2 seconds. No clubbing, cyanosis or edema.   MUSCULOSKELETAL:  Right finger flexor contractures.  Hypotonia to bilateral lower extremities with ankle flexor equina varus deformities and contractures.     NEUROMUSCULAR:   Prior to neurotoxin/neurolysis injections:  Modified Alicia Scale:  1:  1+:    2:  Left knee extender  3:  4:  Bilateral plantar flexors    After neurotoxin/neurolysis injections:  Modified Alicia Scale:  1:  1+:   2:   3: Bilateral plantar flexors  4:    No dyskinetic or dystonic movements appreciated.   Muscle stretch reflexes are 2+ throughout both upper and lower extremities.   No clonus was elicited at either ankle.  Increased sensitivity to bilateral feet.     GAIT/DYNAMIC: not tested, patient bed bound without willingness to transfer    ASSESSMENT:   1. Cerebral palsy with spastic diplegia    2. Impaired mobility and activities of daily living    3. Contracture of left ankle    4. 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 The following recommendations and plan were discussed in depth with the patient who voiced understanding and was in agreement.     PLAN:   Spasticity:   - severe spasticity to bilateral ankle plantarflexors with contractures and equinovarus deformity.   - patient with CKD4 and poor response to oral baclofen in past  - needs repeat focal neurotoxin injections with 1500 units Dysport to the following muscles:  Right soleus   500 units  Right medial gastrocnemius 250 units  Left soleus   500 units  Left medial gastrocnemius 250 units    Pain:  - increased previously prescribed gabapentin to 300 mg TID for  neuropathic pain in bilateral feet    Bracing:    - continus podus like boots for offloading in bed  - patient would benefit from a bilateral ankle dorsiflexion Dynasplint with Dynamic Control boot and Neuroflex SafeBoot secondary to bilateral plantar plantarflexion flexion contracture at approximately 20 degrees.   - will likely need AFOs once weight bearing     Equipment:   - continue use of MWC as main form of ambulation at this time  - RX placed for sit to stand stander    Therapy:   - start HH PT/OT, referrals resent today    I would like to have her  return to clinic for planned 1500 units Dysport to BLE.     48 minutes of total time spent on the encounter, which includes face to face time and non-face to face time preparing to see the patient (eg, review of tests), obtaining and/or reviewing separately obtained history, documenting clinical information in the electronic or other health record, independently interpreting results (not separately reported), communicating results to the patient/family/caregiver, and/or care coordination (not separately reported).     CASE Monsalve, FNP-C  Physical Medicine & Rehabilitation

## 2023-11-16 PROCEDURE — G0180 MD CERTIFICATION HHA PATIENT: HCPCS | Mod: ,,, | Performed by: NURSE PRACTITIONER

## 2023-11-26 NOTE — PROGRESS NOTES
Subjective:     PRESENT for the visit   Patient ID: Francis Bains is a 32 y.o. female.    Chief Complaint: AMS and GI bleed         History of Present Illness:   Francis Bains 32 y.o. female presents today with   Past Medical History:  No date: Cerebral palsy  No date: Hearing impaired person  No date: Hypertension  No date: Osteopenia determined by x-ray  No date: Oswaldo syndrome  Anemia.  GERD    History was given by mother;  In the past 2 months, pt had gone from independent to totally dependent for ADLs. Lethargic, myalgia and anorexia.  Worsening.   Early this morning she had large amount of loose dark stool and has become more pale and lethargy worsening.   On exam, there was no abd pain but she seems to be drifting.  Mother thinks she may have been poisoned and she needs specialized mattress for pressure ulcer prevention and 24 hour sitter at home.  EMS was called and pt will be evaluated further in the ER.   Past Medical History:   Diagnosis Date    Cerebral palsy     Hearing impaired person     Hypertension     Osteopenia determined by x-ray     Oswaldo syndrome      Family History   Problem Relation Age of Onset    Breast cancer Neg Hx     Colon cancer Neg Hx     Ovarian cancer Neg Hx      Social History     Socioeconomic History    Marital status: Single   Tobacco Use    Smoking status: Former Smoker     Packs/day: 0.25     Years: 9.00     Pack years: 2.25    Smokeless tobacco: Never Used   Substance and Sexual Activity    Alcohol use: Yes     Comment: Social    Drug use: Yes     Types: Marijuana    Sexual activity: Yes     Partners: Male     Birth control/protection: Inserts     Comment: Single.     Outpatient Encounter Medications as of 6/6/2022   Medication Sig Dispense Refill    alendronate (FOSAMAX) 70 MG tablet Take 1 tablet (70 mg total) by mouth every 7 days. 4 tablet 11    amlodipine (NORVASC) 10 MG tablet Take 10 mg by mouth once daily.  0     carvedilol (COREG) 6.25 MG tablet 12.5 mg.   2    ergocalciferol (ERGOCALCIFEROL) 50,000 unit Cap Vitamin D2 50,000 unit capsule      estrogen, conjugated,-medroxyprogesterone 0.625-2.5mg (PREMPRO) 0.625-2.5 mg per tablet Take 1 tablet by mouth once daily. 28 tablet 9    famotidine (PEPCID) 20 MG tablet TAKE 1 TABLET PO TWICE A DAY  4    famotidine (PEPCID) 20 MG tablet Take 1 tablet (20 mg total) by mouth 2 (two) times daily. 60 tablet 0    ondansetron (ZOFRAN-ODT) 4 MG TbDL Take 1 tablet (4 mg total) by mouth every 8 (eight) hours as needed (nausea). 20 tablet 0    pantoprazole (PROTONIX) 40 MG tablet TK 1 T PO  D      sertraline (ZOLOFT) 100 MG tablet       traZODone (DESYREL) 50 MG tablet       clonazePAM (KLONOPIN) 1 MG tablet Take 1 tablet (1 mg total) by mouth daily as needed for Anxiety. 30 tablet 1    [DISCONTINUED] losartan (COZAAR) 50 MG tablet losartan 50 mg tablet       No facility-administered encounter medications on file as of 6/6/2022.       Review of Systems   Unable to perform ROS: Acuity of condition       Objective:      /64 (BP Location: Left arm, Patient Position: Sitting, BP Method: Large (Manual))   Pulse (!) 114   Temp 98.1 °F (36.7 °C) (Temporal)   Resp 20   SpO2 98%   Physical Exam  Cardiovascular:      Rate and Rhythm: Tachycardia present.   Abdominal:      General: Abdomen is flat. There is no distension.      Palpations: Abdomen is soft.      Tenderness: There is no abdominal tenderness.         Sitting in the wheelchair, arms limb on the arm rest, lethargic, pale appearing with chapped lips,   Respond to verbal stimulation.    Results for orders placed or performed during the hospital encounter of 05/18/22   Influenza A & B by Molecular    Specimen: Nasal Swab; Nasopharyngeal Swab   Result Value Ref Range    Influenza A, Molecular Negative Negative    Influenza B, Molecular Negative Negative    Flu A & B Source Nasal swab    Group A Strep, Molecular    Specimen:  Throat   Result Value Ref Range    Group A Strep, Molecular Negative Negative   Comprehensive Metabolic Panel   Result Value Ref Range    Sodium 135 (L) 136 - 145 mmol/L    Potassium 4.0 3.5 - 5.1 mmol/L    Chloride 105 95 - 110 mmol/L    CO2 13 (L) 23 - 29 mmol/L    Glucose 99 70 - 110 mg/dL    BUN 52 (H) 6 - 20 mg/dL    Creatinine 4.2 (H) 0.5 - 1.4 mg/dL    Calcium 9.4 8.7 - 10.5 mg/dL    Total Protein 7.5 6.0 - 8.4 g/dL    Albumin 3.5 3.5 - 5.2 g/dL    Total Bilirubin 0.3 0.1 - 1.0 mg/dL    Alkaline Phosphatase 80 55 - 135 U/L    AST 39 10 - 40 U/L    ALT 18 10 - 44 U/L    Anion Gap 17 (H) 8 - 16 mmol/L    eGFR if African American 15 (A) >60 mL/min/1.73 m^2    eGFR if non African American 13 (A) >60 mL/min/1.73 m^2   CBC Auto Differential   Result Value Ref Range    WBC 5.93 3.90 - 12.70 K/uL    RBC 3.46 (L) 4.00 - 5.40 M/uL    Hemoglobin 11.3 (L) 12.0 - 16.0 g/dL    Hematocrit 33.2 (L) 37.0 - 48.5 %    MCV 96 82 - 98 fL    MCH 32.7 (H) 27.0 - 31.0 pg    MCHC 34.0 32.0 - 36.0 g/dL    RDW 14.7 (H) 11.5 - 14.5 %    Platelets 259 150 - 450 K/uL    MPV 9.3 9.2 - 12.9 fL    Immature Granulocytes 0.3 0.0 - 0.5 %    Gran # (ANC) 4.3 1.8 - 7.7 K/uL    Immature Grans (Abs) 0.02 0.00 - 0.04 K/uL    Lymph # 1.0 1.0 - 4.8 K/uL    Mono # 0.5 0.3 - 1.0 K/uL    Eos # 0.1 0.0 - 0.5 K/uL    Baso # 0.02 0.00 - 0.20 K/uL    nRBC 0 0 /100 WBC    Gran % 72.1 38.0 - 73.0 %    Lymph % 17.0 (L) 18.0 - 48.0 %    Mono % 8.4 4.0 - 15.0 %    Eosinophil % 1.9 0.0 - 8.0 %    Basophil % 0.3 0.0 - 1.9 %    Differential Method Automated    Lipase   Result Value Ref Range    Lipase 87 (H) 4 - 60 U/L   Urinalysis, Reflex to Urine Culture Urine, Clean Catch    Specimen: Urine   Result Value Ref Range    Specimen UA Urine, Catheterized     Color, UA Yellow Yellow, Straw, Gloria    Appearance, UA Clear Clear    pH, UA 5.0 5.0 - 8.0    Specific Gravity, UA 1.015 1.005 - 1.030    Protein, UA Negative Negative    Glucose, UA Negative Negative    Ketones,  UA Trace (A) Negative    Bilirubin (UA) 2+ (A) Negative    Occult Blood UA Negative Negative    Nitrite, UA Negative Negative    Urobilinogen, UA Negative <2.0 EU/dL    Leukocytes, UA Negative Negative   Pregnancy, urine rapid   Result Value Ref Range    Preg Test, Ur Negative    Drug screen panel, in-house   Result Value Ref Range    Benzodiazepines Presumptive Positive (A) Negative    Methadone metabolites Negative Negative    Cocaine (Metab.) Negative Negative    Opiate Scrn, Ur Negative Negative    Barbiturate Screen, Ur Negative Negative    Amphetamine Screen, Ur Negative Negative    THC Presumptive Positive (A) Negative    Phencyclidine Negative Negative    Creatinine, Urine 161.9 15.0 - 325.0 mg/dL    Toxicology Information SEE COMMENT    Troponin I   Result Value Ref Range    Troponin I 0.022 0.000 - 0.026 ng/mL   CK-MB   Result Value Ref Range     (H) 20 - 180 U/L    CPK MB 5.1 0.1 - 6.5 ng/mL    MB % 1.3 0.0 - 5.0 %   BNP   Result Value Ref Range    BNP <10 0 - 99 pg/mL   CK   Result Value Ref Range     (H) 20 - 180 U/L   TSH   Result Value Ref Range    TSH 4.301 (H) 0.400 - 4.000 uIU/mL   Phosphorus   Result Value Ref Range    Phosphorus 3.9 2.7 - 4.5 mg/dL   Magnesium   Result Value Ref Range    Magnesium 3.1 (H) 1.6 - 2.6 mg/dL   COVID-19 Rapid Screening   Result Value Ref Range    SARS-CoV-2 RNA, Amplification, Qual Negative Negative   Heterophile Ab Screen   Result Value Ref Range    Monospot Negative Negative   T4, Free   Result Value Ref Range    Free T4 0.76 0.71 - 1.51 ng/dL   Basic metabolic panel   Result Value Ref Range    Sodium 137 136 - 145 mmol/L    Potassium 4.0 3.5 - 5.1 mmol/L    Chloride 111 (H) 95 - 110 mmol/L    CO2 8 (LL) 23 - 29 mmol/L    Glucose 85 70 - 110 mg/dL    BUN 44 (H) 6 - 20 mg/dL    Creatinine 3.4 (H) 0.5 - 1.4 mg/dL    Calcium 7.9 (L) 8.7 - 10.5 mg/dL    Anion Gap 18 (H) 8 - 16 mmol/L    eGFR if African American 20 (A) >60 mL/min/1.73 m^2    eGFR if non   17 (A) >60 mL/min/1.73 m^2   CBC auto differential   Result Value Ref Range    WBC 7.36 3.90 - 12.70 K/uL    RBC 2.80 (L) 4.00 - 5.40 M/uL    Hemoglobin 9.0 (L) 12.0 - 16.0 g/dL    Hematocrit 26.7 (L) 37.0 - 48.5 %    MCV 95 82 - 98 fL    MCH 32.1 (H) 27.0 - 31.0 pg    MCHC 33.7 32.0 - 36.0 g/dL    RDW 14.5 11.5 - 14.5 %    Platelets 207 150 - 450 K/uL    MPV 9.5 9.2 - 12.9 fL    Immature Granulocytes 0.4 0.0 - 0.5 %    Gran # (ANC) 5.5 1.8 - 7.7 K/uL    Immature Grans (Abs) 0.03 0.00 - 0.04 K/uL    Lymph # 1.2 1.0 - 4.8 K/uL    Mono # 0.6 0.3 - 1.0 K/uL    Eos # 0.1 0.0 - 0.5 K/uL    Baso # 0.01 0.00 - 0.20 K/uL    nRBC 0 0 /100 WBC    Gran % 74.6 (H) 38.0 - 73.0 %    Lymph % 16.3 (L) 18.0 - 48.0 %    Mono % 7.9 4.0 - 15.0 %    Eosinophil % 0.7 0.0 - 8.0 %    Basophil % 0.1 0.0 - 1.9 %    Differential Method Automated    Comprehensive metabolic panel   Result Value Ref Range    Sodium 139 136 - 145 mmol/L    Potassium 3.0 (L) 3.5 - 5.1 mmol/L    Chloride 106 95 - 110 mmol/L    CO2 20 (L) 23 - 29 mmol/L    Glucose 103 70 - 110 mg/dL    BUN 37 (H) 6 - 20 mg/dL    Creatinine 2.8 (H) 0.5 - 1.4 mg/dL    Calcium 7.7 (L) 8.7 - 10.5 mg/dL    Total Protein 5.2 (L) 6.0 - 8.4 g/dL    Albumin 2.4 (L) 3.5 - 5.2 g/dL    Total Bilirubin 0.2 0.1 - 1.0 mg/dL    Alkaline Phosphatase 56 55 - 135 U/L    AST 37 10 - 40 U/L    ALT 15 10 - 44 U/L    Anion Gap 13 8 - 16 mmol/L    eGFR if African American 25 (A) >60 mL/min/1.73 m^2    eGFR if non African American 22 (A) >60 mL/min/1.73 m^2   Sedimentation rate   Result Value Ref Range    Sed Rate 60 (H) 0 - 20 mm/Hr   C-reactive protein   Result Value Ref Range    CRP 37.6 (H) 0.0 - 8.2 mg/L   CK   Result Value Ref Range     (H) 20 - 180 U/L   Myasthenia Gravis/Lambert-Eaton   Result Value Ref Range    P/Q Type Calcium Channel Ab 0.00 <=0.02 nmol/L    AChR Binding Ab, Serum 0.00 <=0.02 nmol/L    MG Lambert-Eaton Interpretation SEE BELOW    Basic metabolic panel    Result Value Ref Range    Sodium 141 136 - 145 mmol/L    Potassium 3.3 (L) 3.5 - 5.1 mmol/L    Chloride 101 95 - 110 mmol/L    CO2 23 23 - 29 mmol/L    Glucose 82 70 - 110 mg/dL    BUN 28 (H) 6 - 20 mg/dL    Creatinine 2.3 (H) 0.5 - 1.4 mg/dL    Calcium 8.1 (L) 8.7 - 10.5 mg/dL    Anion Gap 17 (H) 8 - 16 mmol/L    eGFR if African American 31 (A) >60 mL/min/1.73 m^2    eGFR if non African American 27 (A) >60 mL/min/1.73 m^2   CBC auto differential   Result Value Ref Range    WBC 5.76 3.90 - 12.70 K/uL    RBC 2.87 (L) 4.00 - 5.40 M/uL    Hemoglobin 9.2 (L) 12.0 - 16.0 g/dL    Hematocrit 27.6 (L) 37.0 - 48.5 %    MCV 96 82 - 98 fL    MCH 32.1 (H) 27.0 - 31.0 pg    MCHC 33.3 32.0 - 36.0 g/dL    RDW 14.6 (H) 11.5 - 14.5 %    Platelets 185 150 - 450 K/uL    MPV 9.5 9.2 - 12.9 fL    Immature Granulocytes 0.9 (H) 0.0 - 0.5 %    Gran # (ANC) 3.9 1.8 - 7.7 K/uL    Immature Grans (Abs) 0.05 (H) 0.00 - 0.04 K/uL    Lymph # 1.1 1.0 - 4.8 K/uL    Mono # 0.7 0.3 - 1.0 K/uL    Eos # 0.1 0.0 - 0.5 K/uL    Baso # 0.01 0.00 - 0.20 K/uL    nRBC 0 0 /100 WBC    Gran % 67.4 38.0 - 73.0 %    Lymph % 18.4 18.0 - 48.0 %    Mono % 11.5 4.0 - 15.0 %    Eosinophil % 1.6 0.0 - 8.0 %    Basophil % 0.2 0.0 - 1.9 %    Differential Method Automated    Musk Antibody Test   Result Value Ref Range    MuSK Antibody Test 0.00 0.00 - 0.02 nmol/L   Arterial Blood Gas-[place] Only Once   Result Value Ref Range    POC PH 7.310 (A) 7.350 - 7.450    POC PCO2 16 (A) 35 - 45 mmHg    POC PO2 106 (A) 75 - 100 mmHg    POC THb 10.2 (A) 12 - 18 g/dL    POC O2Hb Arterial 96.1 94 - 100 %    POC COHb 1.3 0 - 3.0 %    POC MetHb 1.3 0 - 1.5 %    POC SATURATED O2 98.7 90 - 100 %    POC TCO2 8.6 mmol/L    POC BE -16.00 (A) -2.00 - 2.00 mmol/L    POC HCO3 8.10 22 - 28 mmol/L    Site Right Radial     DelSys Room Air     Allens Test Pass     FiO2 21 21 - 100     Assessment:       1. Decreased level of consciousness    2. Spastic diplegic cerebral palsy    3. Acute renal  failure superimposed on stage 4 chronic kidney disease, unspecified acute renal failure type    4. Gastrointestinal hemorrhage with melena    5. Gastroesophageal reflux disease without esophagitis    6. Anemia due to stage 4 chronic kidney disease        Plan:   Decreased level of consciousness  -     DRUGS OF ABUSE SCREEN, BLOOD; Future; Expected date: 06/06/2022    Spastic diplegic cerebral palsy    Acute renal failure superimposed on stage 4 chronic kidney disease, unspecified acute renal failure type    Gastrointestinal hemorrhage with melena    Gastroesophageal reflux disease without esophagitis    Anemia due to stage 4 chronic kidney disease      Uremic syndrome vs infection vs vertebral lesion vs symptomatic anemia.  Unable to examine pt fully due to urgency.  ER for stat lab and further eval.  Bri Garnica MD    2 seconds or less

## 2023-12-08 ENCOUNTER — TELEPHONE (OUTPATIENT)
Dept: PHYSICAL MEDICINE AND REHAB | Facility: CLINIC | Age: 33
End: 2023-12-08
Payer: MEDICAID

## 2023-12-08 NOTE — TELEPHONE ENCOUNTER
Spoke with pts mom in regards to pts care. Her mom was asking for a  hospice order because she needs more care/help with her. This nurse explained that the home health order is what is in place for her to get extra help in the home.  Her mom stated that home health will not start with her until the dynasplints are in place.  This nurse stated that was understandable. No further needs at this time.   Jailyn Page RN       ----- Message from Rivas Baca sent at 12/8/2023  3:48 PM CST -----  Regarding: pt call back  Name of Who is Calling:Pt Mother         What is the request in detail: Requesting call back in regards to referral           Can the clinic reply by [facility]: no         What Number to Call Back if not in [facility]:Telephone Information:  [employer]          [phone]

## 2023-12-12 ENCOUNTER — TELEPHONE (OUTPATIENT)
Dept: HOME HEALTH SERVICES | Facility: CLINIC | Age: 33
End: 2023-12-12
Payer: MEDICAID

## 2023-12-12 DIAGNOSIS — G80.9 INFANTILE CEREBRAL PALSY: Primary | ICD-10-CM

## 2023-12-15 ENCOUNTER — PES CALL (OUTPATIENT)
Dept: HOME HEALTH SERVICES | Facility: CLINIC | Age: 33
End: 2023-12-15
Payer: MEDICAID

## 2023-12-21 NOTE — TELEPHONE ENCOUNTER
Returned call to [facility] with home health. States pt was completely independent 4 months ago, now completely dependent. Pt is having severe back pain with therapy. Has not had any formal neuro eval and cannot get in touch with PCP. Requesting hospital bed, neuro referral, rosa maria lift, and resting hand splints. Will copy PCP on this encounter for callback.  
Resulted

## 2023-12-27 ENCOUNTER — EXTERNAL HOME HEALTH (OUTPATIENT)
Dept: HOME HEALTH SERVICES | Facility: HOSPITAL | Age: 33
End: 2023-12-27
Payer: MEDICAID

## 2023-12-27 ENCOUNTER — PATIENT MESSAGE (OUTPATIENT)
Dept: PHYSICAL MEDICINE AND REHAB | Facility: CLINIC | Age: 33
End: 2023-12-27
Payer: MEDICAID

## 2023-12-28 ENCOUNTER — EXTERNAL HOME HEALTH (OUTPATIENT)
Dept: HOME HEALTH SERVICES | Facility: HOSPITAL | Age: 33
End: 2023-12-28
Payer: MEDICAID

## 2024-01-19 ENCOUNTER — TELEPHONE (OUTPATIENT)
Dept: OTOLARYNGOLOGY | Facility: CLINIC | Age: 34
End: 2024-01-19
Payer: MEDICAID

## 2024-01-19 NOTE — TELEPHONE ENCOUNTER
----- Message from Nancy Blum, CCC-A sent at 1/19/2024  3:29 PM CST -----    ----- Message -----  From: Berta Sandy  Sent: 1/19/2024   1:00 PM CST  To: Nancy Blum, CCC-A; RENA Rushing Sorry, Claire is out today and Monday.  Francis can see Caryn if Caryn has time.      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 Dr. Holloway did her surgery 10+ years ago and last audiology appointment was in 2019. We are not currently accepting new adult medicaid patients. Would it be best for her to come to [facility] to be seen or can Caryn (audiologist in ) see her here and LMN will be sent to Jewel even though he hasn't seen her in 10+ years?    ----- Message -----  From: Karen Reyna  Sent: 1/19/2024  11:46 AM CST  To: [facility] Ent Clinical Staff    Type:  Appointment Request    Name of Caller:FRANCIS HAQ [[id number]]  When is the first available appointment?No access  Symptoms:ear implant   Would the patient rather a call back or a response via [facility]? callback  Best Call Back Number: [phone]  Additional Information: pt indicates she has would like a hearing implant. Pt indicates she has an old one but she needs a new implant. Pt indicates she would like to schedule the soonest appt available.pt indicates she would like a callback in regards to the times. Please call back with further assistance and more information.

## 2024-01-23 DIAGNOSIS — E28.39 PREMATURE OVARIAN FAILURE: ICD-10-CM

## 2024-01-23 DIAGNOSIS — Z79.890 HORMONE REPLACEMENT THERAPY (HRT): ICD-10-CM

## 2024-01-23 DIAGNOSIS — N94.10 DYSPAREUNIA, FEMALE: ICD-10-CM

## 2024-01-23 RX ORDER — CONJUGATED ESTROGENS AND MEDROXYPROGESTERONE ACETATE .625; 2.5 MG/1; MG/1
1 TABLET, SUGAR COATED ORAL DAILY
Qty: 28 TABLET | Refills: 1 | Status: SHIPPED | OUTPATIENT
Start: 2024-01-23 | End: 2024-03-20 | Stop reason: SDUPTHER

## 2024-01-23 RX ORDER — ALENDRONATE SODIUM 70 MG/1
70 TABLET ORAL
Qty: 4 TABLET | Refills: 1 | Status: SHIPPED | OUTPATIENT
Start: 2024-01-23 | End: 2024-03-20 | Stop reason: SDUPTHER

## 2024-01-23 RX ORDER — AMLODIPINE BESYLATE 10 MG/1
10 TABLET ORAL DAILY
Qty: 30 TABLET | Refills: 1 | Status: SHIPPED | OUTPATIENT
Start: 2024-01-23

## 2024-01-23 NOTE — TELEPHONE ENCOUNTER
Patients mother states since last visit on 2/11/2022 patient is now wheel chair/bed bound and living in [place] with her. She states due to this she has not been able to get down here for appt. Mother states that she bought her own van to be able to transport patient and would like to schedule appt with Dr Lamb because patient does not want to get established with a GYN in [place]. Appt scheduled with mother on 2/23/2024. Mother would like to see if refills for Prempro, Fosamax and her B/P medication, Amlodipine could be sent in to [facility]'s pharmacy to cover her until appt. She states that she is going to find her a PCP down here and try to coordinate that appt with appt here.

## 2024-01-23 NOTE — TELEPHONE ENCOUNTER
----- Message from Marissa Perera sent at 1/23/2024 10:41 AM CST -----  Contact: Nette / mother  Francis Bains  MRN: [id number]  Home Phone      [phone]  Work Phone      Not on file.  Mobile          [phone]    Patient Care Team:  Yue Gold (Inactive) as PCP - General  OB? No  What phone number can you be reached at? [phone]  Message: pt's mother would like pt to see Dr Lamb regarding condition going on and medications

## 2024-02-01 ENCOUNTER — CLINICAL SUPPORT (OUTPATIENT)
Dept: AUDIOLOGY | Facility: CLINIC | Age: 34
End: 2024-02-01
Payer: MEDICAID

## 2024-02-01 DIAGNOSIS — H90.3 SENSORINEURAL HEARING LOSS, BILATERAL: Primary | ICD-10-CM

## 2024-02-05 NOTE — PROGRESS NOTES
COCHLEAR IMPLANT PROGRAMMING     Francis Bains was seen on 02/01/2024 for a cochlear implant appointment. Patient was accompanied by her mother to today's appointment. Appointment was completed via certified . Patient reported her processor was broken beyond repair by the dog a few months ago. She has been wearing her hearing aid on her left ear but noted issues with battery life and sound quality. Patient did not bring cochlear implant processor to today's appointment.     Discussed that [employer] offers a one time repair and extended warranty. Patient will need to contact [employer] to complete request. Advised patient to contact hearing aid audiologist regarding repairs.      Recommendations:   Return as needed.

## 2024-03-20 ENCOUNTER — OFFICE VISIT (OUTPATIENT)
Dept: OBSTETRICS AND GYNECOLOGY | Facility: CLINIC | Age: 34
End: 2024-03-20
Payer: MEDICAID

## 2024-03-20 VITALS
SYSTOLIC BLOOD PRESSURE: 130 MMHG | DIASTOLIC BLOOD PRESSURE: 78 MMHG | HEART RATE: 86 BPM | RESPIRATION RATE: 18 BRPM | OXYGEN SATURATION: 96 % | HEIGHT: 63 IN | BODY MASS INDEX: 35.73 KG/M2

## 2024-03-20 DIAGNOSIS — Z00.00 WELL WOMAN EXAM WITHOUT GYNECOLOGICAL EXAM: Primary | ICD-10-CM

## 2024-03-20 DIAGNOSIS — Z79.890 HORMONE REPLACEMENT THERAPY (HRT): ICD-10-CM

## 2024-03-20 DIAGNOSIS — M81.8 OTHER OSTEOPOROSIS WITHOUT CURRENT PATHOLOGICAL FRACTURE: ICD-10-CM

## 2024-03-20 DIAGNOSIS — Z91.89 AT HIGH RISK FOR COMPLICATION OF IMMOBILITY: ICD-10-CM

## 2024-03-20 DIAGNOSIS — E28.39 PREMATURE OVARIAN FAILURE: ICD-10-CM

## 2024-03-20 DIAGNOSIS — N94.10 DYSPAREUNIA, FEMALE: ICD-10-CM

## 2024-03-20 DIAGNOSIS — Z74.09 IMMOBILITY: ICD-10-CM

## 2024-03-20 PROCEDURE — 99999 PR PBB SHADOW E&M-EST. PATIENT-LVL IV: CPT | Mod: PBBFAC,,, | Performed by: OBSTETRICS & GYNECOLOGY

## 2024-03-20 PROCEDURE — 99214 OFFICE O/P EST MOD 30 MIN: CPT | Mod: PBBFAC | Performed by: OBSTETRICS & GYNECOLOGY

## 2024-03-20 PROCEDURE — 3078F DIAST BP <80 MM HG: CPT | Mod: CPTII,,, | Performed by: OBSTETRICS & GYNECOLOGY

## 2024-03-20 PROCEDURE — 1160F RVW MEDS BY RX/DR IN RCRD: CPT | Mod: CPTII,,, | Performed by: OBSTETRICS & GYNECOLOGY

## 2024-03-20 PROCEDURE — 3008F BODY MASS INDEX DOCD: CPT | Mod: CPTII,,, | Performed by: OBSTETRICS & GYNECOLOGY

## 2024-03-20 PROCEDURE — 99395 PREV VISIT EST AGE 18-39: CPT | Mod: S$PBB,,, | Performed by: OBSTETRICS & GYNECOLOGY

## 2024-03-20 PROCEDURE — 1159F MED LIST DOCD IN RCRD: CPT | Mod: CPTII,,, | Performed by: OBSTETRICS & GYNECOLOGY

## 2024-03-20 PROCEDURE — 3075F SYST BP GE 130 - 139MM HG: CPT | Mod: CPTII,,, | Performed by: OBSTETRICS & GYNECOLOGY

## 2024-03-20 RX ORDER — CONJUGATED ESTROGENS AND MEDROXYPROGESTERONE ACETATE .625; 2.5 MG/1; MG/1
1 TABLET, SUGAR COATED ORAL DAILY
Qty: 28 TABLET | Refills: 1 | Status: SHIPPED | OUTPATIENT
Start: 2024-03-20 | End: 2024-06-13 | Stop reason: SDUPTHER

## 2024-03-20 RX ORDER — ALENDRONATE SODIUM 70 MG/1
70 TABLET ORAL
Qty: 4 TABLET | Refills: 11 | Status: SHIPPED | OUTPATIENT
Start: 2024-03-20 | End: 2025-03-20

## 2024-03-20 NOTE — PROGRESS NOTES
Subjective:    Patient ID: Francis Bains is a 34 y.o. y.o. female.     Chief Complaint: Annual Well Woman Exam     History of Present Illness:  Francis presents today for Annual Well Woman exam. She describes her menses as  absent .She denies pelvic pain.  She denies breast tenderness, masses, nipple discharge. She denies difficulty with urination or bowel movements. She admits to menopausal symptoms such as hotflashes, vaginal dryness, and night sweats however better controlled with current Prempro dosage. She denies bloating, early satiety, or weight changes. She is sexually active. Contraception is by no method.    The following portions of the patient's history were reviewed and updated as appropriate: allergies, current medications, past family history, past medical history, past social history, past surgical history and problem list.      Menstrual History:   No LMP recorded (lmp unknown). (Menstrual status: Other)..     OB History          0    Para   0    Term   0       0    AB   0    Living   0         SAB   0    IAB   0    Ectopic   0    Multiple   0    Live Births   0                 The following portions of the patient's history were reviewed and updated as appropriate: allergies, current medications, past family history, past medical history, past social history, past surgical history and problem list.    ROS:   Review of Systems   Constitutional:  Positive for malaise/fatigue. Negative for chills, diaphoresis, fever and weight loss.   HENT:  Negative for congestion, ear discharge, ear pain, hearing loss, nosebleeds, sore throat and tinnitus.    Eyes:  Negative for blurred vision, double vision, photophobia, pain, discharge and redness.   Respiratory:  Negative for cough, hemoptysis, sputum production, shortness of breath, wheezing and stridor.    Cardiovascular:  Negative for chest pain, palpitations, orthopnea and leg swelling.   Gastrointestinal:  Negative for abdominal pain,  "constipation, diarrhea, heartburn, nausea and vomiting.   Genitourinary:  Negative for dysuria, frequency, hematuria and urgency.   Musculoskeletal:  Positive for back pain. Negative for joint pain, myalgias and neck pain.   Skin:  Negative for itching and rash.   Neurological:  Negative for dizziness, tingling, tremors, sensory change, speech change, weakness and headaches.   Endo/Heme/Allergies:  Negative for environmental allergies. Does not bruise/bleed easily.   Psychiatric/Behavioral:  Negative for depression, hallucinations, substance abuse and suicidal ideas. The patient is nervous/anxious.          Objective:    Vital Signs:  Vitals:    03/20/24 1525   BP: 130/78   BP Location: Left arm   Patient Position: Sitting   BP Method: Medium (Manual)   Pulse: 86   Resp: 18   SpO2: 96%   Weight: Comment: unable to obtain   Height: 5' 3" (1.6 m)         Physical Exam:  General:  alert, cooperative, appears stated age   Skin:  Skin color, texture, turgor normal. No rashes or lesions   HEENT:  conjunctivae/corneas clear. PERRL.   Neck: supple, trachea midline, no adenopathy or thyromegally   Respiratory:  clear to auscultation bilaterally   Heart:  regular rate and rhythm, S1, S2 normal, no murmur, click, rub or gallop   Breasts:  Deferred   Abdomen:  normal findings: bowel sounds normal, no masses palpable, no organomegaly and soft, non-tender   Pelvis: Deferred    Extremities: Normal ROM; no edema, no cyanosis   Neurologial: Normal strength and tone. No focal numbness or weakness. Reflexes 2+ and equal.   Psychiatric: normal mood, speech, dress, and thought processes         Assessment:       Healthy female exam.     1. Well woman exam without gynecological exam    2. Other osteoporosis without current pathological fracture    3. Hormone replacement therapy (HRT)    4. Premature ovarian failure    5. Dyspareunia, female          Plan:        Prempro refill sent   Discussed weight bearing exercise, calcium, and " vitamin d for osteoporosis prevention  DEXA scan ordered  Refill Fosamax  Pap smear deferred  RTC in 1 year or prn    COUNSELING:  Francis was counseled on STD pevention, use and side-effects of various contraceptive measures, A.C.O.G. Pap guidelines and recommendations for yearly pelvic exams in addition to recommendations for monthly self breast exams; to see her PCP for other health maintenance.

## 2024-03-21 ENCOUNTER — TELEPHONE (OUTPATIENT)
Dept: OBSTETRICS AND GYNECOLOGY | Facility: CLINIC | Age: 34
End: 2024-03-21
Payer: MEDICAID

## 2024-03-21 ENCOUNTER — HOSPITAL ENCOUNTER (OUTPATIENT)
Dept: RADIOLOGY | Facility: HOSPITAL | Age: 34
Discharge: HOME | End: 2024-03-21
Attending: OBSTETRICS & GYNECOLOGY
Payer: MEDICAID

## 2024-03-21 DIAGNOSIS — M81.8 OTHER OSTEOPOROSIS WITHOUT CURRENT PATHOLOGICAL FRACTURE: ICD-10-CM

## 2024-03-21 PROCEDURE — 77080 DXA BONE DENSITY AXIAL: CPT | Mod: 26,,, | Performed by: RADIOLOGY

## 2024-03-21 PROCEDURE — 77080 DXA BONE DENSITY AXIAL: CPT | Mod: TC

## 2024-03-21 NOTE — TELEPHONE ENCOUNTER
----- Message from Marissa Trever sent at 3/21/2024 11:52 AM CDT -----  Contact: Nette Andrews / mother  Francis Bains  MRN: [id number]  Home Phone      [phone]  Work Phone      Not on file.  Mobile          [phone]    Patient Care Team:  Yue Gold (Inactive) as PCP - General  Rand Morrison MD as Consulting Physician (Obstetrics and Gynecology)  OB? No  What phone number can you be reached at? [phone]  Message: states pt's hand was not stuck in wheelchair yesterday at appt. States hand is swollen and may be broken. Wants to know if while she is getting DEXA, can we put in an order for x-ray. Would like to be notified

## 2024-04-03 ENCOUNTER — PATIENT MESSAGE (OUTPATIENT)
Dept: OBSTETRICS AND GYNECOLOGY | Facility: CLINIC | Age: 34
End: 2024-04-03
Payer: MEDICAID

## 2024-04-08 ENCOUNTER — TELEPHONE (OUTPATIENT)
Dept: OBSTETRICS AND GYNECOLOGY | Facility: CLINIC | Age: 34
End: 2024-04-08
Payer: MEDICAID

## 2024-04-08 DIAGNOSIS — M81.8 OTHER OSTEOPOROSIS WITHOUT CURRENT PATHOLOGICAL FRACTURE: Primary | ICD-10-CM

## 2024-04-08 NOTE — TELEPHONE ENCOUNTER
----- Message from Sae Jasso LPN sent at 4/2/2024 11:33 AM CDT -----  Are you aware of any [facility] facilities in  that do infusions as outpt?  ----- Message -----  From: Rand Morrison MD  Sent: 4/2/2024   9:16 AM CDT  To: Sae Jasso LPN    Can you please find out if there are any [facility] facilities in  that do the infusions as outpatient?      ----- Message -----  From: Sae Jasso LPN  Sent: 4/1/2024   2:56 PM CDT  To: Rand Morrison MD    Please advise.    Contacted pt's mother.  States she would be interested in Prolia IJ.  Mother is asking if this is something she can administer at home or at a facility in [place] d/t the complications with travel.  She is also questioning air compressions for legs  Please advise.

## 2024-04-08 NOTE — TELEPHONE ENCOUNTER
Can you put in orders for the Prolia for it to be done at the [facility] so we can get it to precert dept to verify it is covered. Once we know it is covered we can change facility to one closer to patient.

## 2024-04-09 PROBLEM — M81.8 OTHER OSTEOPOROSIS WITHOUT CURRENT PATHOLOGICAL FRACTURE: Status: ACTIVE | Noted: 2024-04-09

## 2024-05-21 ENCOUNTER — TELEPHONE (OUTPATIENT)
Dept: AUDIOLOGY | Facility: CLINIC | Age: 34
End: 2024-05-21
Payer: MEDICAID

## 2024-05-21 NOTE — TELEPHONE ENCOUNTER
----- Message from Yanely Soto sent at 5/17/2024  1:31 PM CDT -----  Regarding: pt advice  Contact: Dilcia ayers/[facility] case management @[phone]  Caller returning call in regards to getting letter for medical necessity for a cochlear device. Fax#[phone]. Pls call to discuss.

## 2024-05-23 ENCOUNTER — TELEPHONE (OUTPATIENT)
Dept: PHYSICAL MEDICINE AND REHAB | Facility: CLINIC | Age: 34
End: 2024-05-23
Payer: MEDICAID

## 2024-05-23 ENCOUNTER — PATIENT MESSAGE (OUTPATIENT)
Dept: PHYSICAL MEDICINE AND REHAB | Facility: CLINIC | Age: 34
End: 2024-05-23
Payer: MEDICAID

## 2024-05-23 NOTE — TELEPHONE ENCOUNTER
Dr. Rodriguez and Yue Cortez' NP have both released pt and are not willing to see her anymore. Pts mother has been told this multiple times. Message sent to Dayna Monroy, clinical supervisor for further follow up.  No further needs at this time.   Jailyn Page RN       ----- Message from Natty Romero sent at 5/23/2024 10:45 AM CDT -----  Contact: pt huy pride  Type: Needs Medical Advice  Who Called:  pt huy pride  Best Call Back Number: [phone]   Additional Information: would like to speak with the office about what has being going on with the pt and also her braces.she states that sj has been trying to reach the office so would you please call

## 2024-05-24 NOTE — TELEPHONE ENCOUNTER
Spoke with patient's mother and she verbalized understanding that Dr Rodriguez is not willing to continue to see her daughter as a patient at this time. She is asking if there is someone else she can be referred to or suggestions for who might be able to see her.

## 2024-05-29 ENCOUNTER — TELEPHONE (OUTPATIENT)
Dept: AUDIOLOGY | Facility: CLINIC | Age: 34
End: 2024-05-29
Payer: MEDICAID

## 2024-05-29 NOTE — TELEPHONE ENCOUNTER
----- Message from Germain Gutierrez sent at 5/29/2024 11:58 AM CDT -----  Contact: Michael Bennett/ [employer]  Michael Bennett is calling in regards to speaking with Caryn Ortiz pertaining to upgraded orders for a [employer] Sound processor.  Please call back at [phone]    Thanks

## 2024-06-13 DIAGNOSIS — N94.10 DYSPAREUNIA, FEMALE: ICD-10-CM

## 2024-06-13 DIAGNOSIS — E28.39 PREMATURE OVARIAN FAILURE: ICD-10-CM

## 2024-06-13 DIAGNOSIS — Z79.890 HORMONE REPLACEMENT THERAPY (HRT): ICD-10-CM

## 2024-06-13 NOTE — TELEPHONE ENCOUNTER
Refill Routing Note   Medication(s) are not appropriate for processing by [facility] for the following reason(s):        Drug-disease interaction      [facility] action(s):  Defer        Medication Therapy Plan: LOV 3/20/24; /78    Pharmacist review requested: Yes     Appointments  past 12m or future 3m with PCP    Date Provider   Last Visit   3/20/2024 Rand Morrison MD   Next Visit   Visit date not found Rand Morrison MD   ED visits in past 90 days: 0        Note composed:4:04 PM 06/13/2024

## 2024-06-14 RX ORDER — CONJUGATED ESTROGENS AND MEDROXYPROGESTERONE ACETATE .625; 2.5 MG/1; MG/1
1 TABLET, SUGAR COATED ORAL DAILY
Qty: 84 TABLET | Refills: 3 | Status: SHIPPED | OUTPATIENT
Start: 2024-06-14

## 2024-06-14 NOTE — TELEPHONE ENCOUNTER
Refill Decision Note   Francis Bains  is requesting a refill authorization.  Brief Assessment and Rationale for Refill:  Approve     Medication Therapy Plan:  LOV 3/20/24; /78      Pharmacist review requested: Yes   Extended chart review required: Yes   Comments:     Note composed:12:52 AM 06/14/2024

## 2024-06-26 ENCOUNTER — TELEPHONE (OUTPATIENT)
Dept: NEUROLOGY | Facility: CLINIC | Age: 34
End: 2024-06-26
Payer: MEDICAID

## 2024-06-26 NOTE — TELEPHONE ENCOUNTER
----- Message from Skylar Guevara sent at 6/26/2024 10:49 AM CDT -----  Contact: Mother, Nette, [phone]  Calling to schedule an appointment, referral was faxed. Please call her. Thanks.

## 2025-05-08 ENCOUNTER — TELEPHONE (OUTPATIENT)
Dept: OBSTETRICS AND GYNECOLOGY | Facility: CLINIC | Age: 35
End: 2025-05-08
Payer: MEDICAID

## 2025-05-08 NOTE — TELEPHONE ENCOUNTER
----- Message from Marissa sent at 5/8/2025  1:35 PM CDT -----  Contact: Self  Francis BainsN: [id number] Phone      [phone] Phone      Not on file.Mobile          [phone] Care Team:Yue Gold (Inactive) as PCP - [facility]-Rand Regalado MD as Consulting Physician (Obstetrics and Gynecology)OB? NoWt phone number can you be reached at? [phone] (pt's mom)Message: needs refill of prempro and bone medication - doesn't know namePharmacy: [facility]

## 2025-05-08 NOTE — TELEPHONE ENCOUNTER
I notified the patient's mother that the patient needs to be seen. The patient's mother said that she wanted to check the patient's other appointments and then call back to schedule. She verbalized understanding.

## 2025-05-08 NOTE — TELEPHONE ENCOUNTER
The patient's mother called and is requesting a refill for her prempro and alendronate. Patient last seen in 03/2024. I offered to schedule the patient for an annual. 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 Reports that they also live in [place]. Her mother also reports that the patient has had a cycle and this is odd because she has small reproductive organs. The patient's mother is wondering if the patient needs to be seen because she does not have much to be seen for and can not lay on an exam table. The mother is asking if she is needing to be seen, if it can be a virtual visit. Please advise.

## 2025-05-14 ENCOUNTER — TELEPHONE (OUTPATIENT)
Dept: OBSTETRICS AND GYNECOLOGY | Facility: CLINIC | Age: 35
End: 2025-05-14
Payer: MEDICAID

## 2025-05-14 NOTE — TELEPHONE ENCOUNTER
Message  Received: Today   Appointment Access  Stacy Vernon, FABIAN Gordillo Staff  Caller: self (Today, 10:25 AM)  Francis Bains  MRN: [id number]  Home Phone      [phone]  Work Phone      Not on file.  Mobile          [phone]    Patient Care Team:  Yue Gold (Inactive) as PCP - General  Rand Morrison MD as Consulting Physician (Obstetrics and Gynecology)  OB? No  What phone number can you be reached at? [phone]  Message: Would like to see if she can get a virtual appt for her annual.  Stated is unable to come in due to disability.

## 2025-05-15 NOTE — TELEPHONE ENCOUNTER
Attempted to contact patient, no answer. Mailbox full and not able to accept messages at this time.